# Patient Record
Sex: MALE | Race: WHITE | Employment: OTHER | URBAN - METROPOLITAN AREA
[De-identification: names, ages, dates, MRNs, and addresses within clinical notes are randomized per-mention and may not be internally consistent; named-entity substitution may affect disease eponyms.]

---

## 2018-11-29 ENCOUNTER — APPOINTMENT (OUTPATIENT)
Dept: GENERAL RADIOLOGY | Age: 68
DRG: 251 | End: 2018-11-29
Attending: EMERGENCY MEDICINE
Payer: MEDICARE

## 2018-11-29 ENCOUNTER — HOSPITAL ENCOUNTER (INPATIENT)
Age: 68
LOS: 1 days | Discharge: HOME | DRG: 251 | End: 2018-12-01
Attending: EMERGENCY MEDICINE | Admitting: INTERNAL MEDICINE
Payer: MEDICARE

## 2018-11-29 DIAGNOSIS — I21.4 NSTEMI (NON-ST ELEVATED MYOCARDIAL INFARCTION) (HCC): Primary | ICD-10-CM

## 2018-11-29 DIAGNOSIS — I20.0 UNSTABLE ANGINA (HCC): ICD-10-CM

## 2018-11-29 PROBLEM — I24.9 ACS (ACUTE CORONARY SYNDROME) (HCC): Status: ACTIVE | Noted: 2018-11-29

## 2018-11-29 LAB
ALBUMIN SERPL-MCNC: 3.4 G/DL (ref 3.5–5)
ALBUMIN/GLOB SERPL: 1 {RATIO} (ref 1.1–2.2)
ALP SERPL-CCNC: 106 U/L (ref 45–117)
ALT SERPL-CCNC: 27 U/L (ref 12–78)
ANION GAP SERPL CALC-SCNC: 3 MMOL/L (ref 5–15)
AST SERPL-CCNC: 12 U/L (ref 15–37)
ATRIAL RATE: 55 BPM
BASOPHILS # BLD: 0.1 K/UL (ref 0–0.1)
BASOPHILS NFR BLD: 1 % (ref 0–1)
BILIRUB SERPL-MCNC: 0.5 MG/DL (ref 0.2–1)
BNP SERPL-MCNC: 187 PG/ML (ref 0–125)
BUN SERPL-MCNC: 15 MG/DL (ref 6–20)
BUN/CREAT SERPL: 13 (ref 12–20)
CALCIUM SERPL-MCNC: 8.3 MG/DL (ref 8.5–10.1)
CALCULATED P AXIS, ECG09: 90 DEGREES
CALCULATED R AXIS, ECG10: 24 DEGREES
CALCULATED T AXIS, ECG11: 2 DEGREES
CHLORIDE SERPL-SCNC: 109 MMOL/L (ref 97–108)
CK SERPL-CCNC: 113 U/L (ref 39–308)
CO2 SERPL-SCNC: 28 MMOL/L (ref 21–32)
CREAT SERPL-MCNC: 1.16 MG/DL (ref 0.7–1.3)
DIAGNOSIS, 93000: NORMAL
DIFFERENTIAL METHOD BLD: ABNORMAL
EOSINOPHIL # BLD: 0.3 K/UL (ref 0–0.4)
EOSINOPHIL NFR BLD: 3 % (ref 0–7)
ERYTHROCYTE [DISTWIDTH] IN BLOOD BY AUTOMATED COUNT: 13.3 % (ref 11.5–14.5)
GLOBULIN SER CALC-MCNC: 3.5 G/DL (ref 2–4)
GLUCOSE SERPL-MCNC: 95 MG/DL (ref 65–100)
HCT VFR BLD AUTO: 44.5 % (ref 36.6–50.3)
HGB BLD-MCNC: 14.8 G/DL (ref 12.1–17)
IMM GRANULOCYTES # BLD: 0 K/UL (ref 0–0.04)
IMM GRANULOCYTES NFR BLD AUTO: 0 % (ref 0–0.5)
LYMPHOCYTES # BLD: 2.7 K/UL (ref 0.8–3.5)
LYMPHOCYTES NFR BLD: 24 % (ref 12–49)
MCH RBC QN AUTO: 32.2 PG (ref 26–34)
MCHC RBC AUTO-ENTMCNC: 33.3 G/DL (ref 30–36.5)
MCV RBC AUTO: 96.9 FL (ref 80–99)
MONOCYTES # BLD: 1.2 K/UL (ref 0–1)
MONOCYTES NFR BLD: 11 % (ref 5–13)
NEUTS SEG # BLD: 6.7 K/UL (ref 1.8–8)
NEUTS SEG NFR BLD: 61 % (ref 32–75)
NRBC # BLD: 0 K/UL (ref 0–0.01)
NRBC BLD-RTO: 0 PER 100 WBC
P-R INTERVAL, ECG05: 180 MS
PLATELET # BLD AUTO: 237 K/UL (ref 150–400)
PMV BLD AUTO: 11.7 FL (ref 8.9–12.9)
POTASSIUM SERPL-SCNC: 4.2 MMOL/L (ref 3.5–5.1)
PROT SERPL-MCNC: 6.9 G/DL (ref 6.4–8.2)
Q-T INTERVAL, ECG07: 432 MS
QRS DURATION, ECG06: 92 MS
QTC CALCULATION (BEZET), ECG08: 413 MS
RBC # BLD AUTO: 4.59 M/UL (ref 4.1–5.7)
SODIUM SERPL-SCNC: 140 MMOL/L (ref 136–145)
TROPONIN I SERPL-MCNC: 0.13 NG/ML
TROPONIN I SERPL-MCNC: 0.13 NG/ML
VENTRICULAR RATE, ECG03: 55 BPM
WBC # BLD AUTO: 11.1 K/UL (ref 4.1–11.1)

## 2018-11-29 PROCEDURE — 85025 COMPLETE CBC W/AUTO DIFF WBC: CPT

## 2018-11-29 PROCEDURE — 84484 ASSAY OF TROPONIN QUANT: CPT

## 2018-11-29 PROCEDURE — 71045 X-RAY EXAM CHEST 1 VIEW: CPT

## 2018-11-29 PROCEDURE — 83880 ASSAY OF NATRIURETIC PEPTIDE: CPT

## 2018-11-29 PROCEDURE — 99218 HC RM OBSERVATION: CPT

## 2018-11-29 PROCEDURE — 74011250637 HC RX REV CODE- 250/637: Performed by: EMERGENCY MEDICINE

## 2018-11-29 PROCEDURE — 36415 COLL VENOUS BLD VENIPUNCTURE: CPT

## 2018-11-29 PROCEDURE — 80053 COMPREHEN METABOLIC PANEL: CPT

## 2018-11-29 PROCEDURE — 99285 EMERGENCY DEPT VISIT HI MDM: CPT

## 2018-11-29 PROCEDURE — 93005 ELECTROCARDIOGRAM TRACING: CPT

## 2018-11-29 PROCEDURE — 74011250636 HC RX REV CODE- 250/636: Performed by: NURSE PRACTITIONER

## 2018-11-29 PROCEDURE — 82550 ASSAY OF CK (CPK): CPT

## 2018-11-29 PROCEDURE — 74011250637 HC RX REV CODE- 250/637: Performed by: NURSE PRACTITIONER

## 2018-11-29 RX ORDER — NITROGLYCERIN 0.4 MG/1
0.4 TABLET SUBLINGUAL
Status: DISCONTINUED | OUTPATIENT
Start: 2018-11-29 | End: 2018-11-29

## 2018-11-29 RX ORDER — METOPROLOL SUCCINATE 25 MG/1
25 TABLET, EXTENDED RELEASE ORAL EVERY EVENING
Status: DISCONTINUED | OUTPATIENT
Start: 2018-11-29 | End: 2018-12-01 | Stop reason: HOSPADM

## 2018-11-29 RX ORDER — SODIUM CHLORIDE 0.9 % (FLUSH) 0.9 %
5-10 SYRINGE (ML) INJECTION AS NEEDED
Status: DISCONTINUED | OUTPATIENT
Start: 2018-11-29 | End: 2018-12-01 | Stop reason: HOSPADM

## 2018-11-29 RX ORDER — ATORVASTATIN CALCIUM 20 MG/1
20 TABLET, FILM COATED ORAL
COMMUNITY

## 2018-11-29 RX ORDER — SODIUM CHLORIDE 0.9 % (FLUSH) 0.9 %
5-10 SYRINGE (ML) INJECTION EVERY 8 HOURS
Status: DISCONTINUED | OUTPATIENT
Start: 2018-11-29 | End: 2018-12-01 | Stop reason: HOSPADM

## 2018-11-29 RX ORDER — ASPIRIN 325 MG
325 TABLET ORAL ONCE
Status: COMPLETED | OUTPATIENT
Start: 2018-11-29 | End: 2018-11-29

## 2018-11-29 RX ORDER — DIAZEPAM 5 MG/1
5 TABLET ORAL
Status: DISCONTINUED | OUTPATIENT
Start: 2018-11-29 | End: 2018-12-01 | Stop reason: HOSPADM

## 2018-11-29 RX ORDER — DULOXETIN HYDROCHLORIDE 20 MG/1
20 CAPSULE, DELAYED RELEASE ORAL EVERY EVENING
Status: DISCONTINUED | OUTPATIENT
Start: 2018-11-29 | End: 2018-12-01 | Stop reason: HOSPADM

## 2018-11-29 RX ORDER — METOPROLOL TARTRATE 25 MG/1
12.5 TABLET, FILM COATED ORAL 2 TIMES DAILY
Status: DISCONTINUED | OUTPATIENT
Start: 2018-11-29 | End: 2018-11-29

## 2018-11-29 RX ORDER — ASPIRIN 81 MG/1
81 TABLET ORAL DAILY
Status: DISCONTINUED | OUTPATIENT
Start: 2018-11-30 | End: 2018-12-01 | Stop reason: HOSPADM

## 2018-11-29 RX ORDER — ENOXAPARIN SODIUM 150 MG/ML
105 INJECTION SUBCUTANEOUS ONCE
Status: COMPLETED | OUTPATIENT
Start: 2018-11-29 | End: 2018-11-29

## 2018-11-29 RX ORDER — DULOXETIN HYDROCHLORIDE 20 MG/1
20 CAPSULE, DELAYED RELEASE ORAL DAILY
COMMUNITY
End: 2018-12-28

## 2018-11-29 RX ORDER — BUTALBITAL, ACETAMINOPHEN AND CAFFEINE 50; 325; 40 MG/1; MG/1; MG/1
1 TABLET ORAL
Status: COMPLETED | OUTPATIENT
Start: 2018-11-29 | End: 2018-11-29

## 2018-11-29 RX ORDER — IBUPROFEN 200 MG
1 TABLET ORAL DAILY
Status: DISCONTINUED | OUTPATIENT
Start: 2018-11-29 | End: 2018-12-01 | Stop reason: HOSPADM

## 2018-11-29 RX ORDER — ATORVASTATIN CALCIUM 20 MG/1
20 TABLET, FILM COATED ORAL
Status: DISCONTINUED | OUTPATIENT
Start: 2018-11-29 | End: 2018-12-01 | Stop reason: HOSPADM

## 2018-11-29 RX ORDER — PANTOPRAZOLE SODIUM 40 MG/1
40 TABLET, DELAYED RELEASE ORAL
Status: DISCONTINUED | OUTPATIENT
Start: 2018-11-29 | End: 2018-12-01 | Stop reason: HOSPADM

## 2018-11-29 RX ADMIN — ENOXAPARIN SODIUM 105 MG: 120 INJECTION SUBCUTANEOUS at 18:52

## 2018-11-29 RX ADMIN — DULOXETINE HYDROCHLORIDE 20 MG: 20 CAPSULE, DELAYED RELEASE ORAL at 18:41

## 2018-11-29 RX ADMIN — PANTOPRAZOLE SODIUM 40 MG: 40 TABLET, DELAYED RELEASE ORAL at 23:05

## 2018-11-29 RX ADMIN — BUTALBITAL, ACETAMINOPHEN AND CAFFEINE 1 TABLET: 50; 325; 40 TABLET ORAL at 15:10

## 2018-11-29 RX ADMIN — ASPIRIN 325 MG: 325 TABLET ORAL at 12:55

## 2018-11-29 RX ADMIN — METOPROLOL SUCCINATE 25 MG: 50 TABLET, EXTENDED RELEASE ORAL at 18:45

## 2018-11-29 RX ADMIN — NITROGLYCERIN 1 INCH: 20 OINTMENT TOPICAL at 18:46

## 2018-11-29 RX ADMIN — Medication 10 ML: at 23:06

## 2018-11-29 RX ADMIN — NITROGLYCERIN 0.4 MG: 0.4 TABLET, ORALLY DISINTEGRATING SUBLINGUAL at 14:38

## 2018-11-29 RX ADMIN — ATORVASTATIN CALCIUM 20 MG: 20 TABLET, FILM COATED ORAL at 23:05

## 2018-11-29 NOTE — H&P
[address] [place] [address]  [phone] CARDIOLOGY HISTORY AND PHYSICAL Date of  Admission: 11/29/2018 12:36 PM  
 
Admission type:Emergency Subjective:  
  
Gracia Irby is a 77 y.o. male with PMH CAD s/p stent (~ 7  years ago), HTN, CVA, GERD, bladder CA who was admitted for ACS (acute coronary syndrome) ([facility] [I24.9]. Mr. Micheal Degroot presented to the ED with c/o left sided chest pain for the past ~6 weeks. The pain is mostly constant, but occasionally is absent. The pain intermittently radiates down bilateral arms to his elbows. He describes the pain as \"like a sprain\". He does not think that there are any specific aggravating or alleviating factors. Nontender to palpation. He endorses KIM with moderate/heavy activity. No n/v, numbness/tingling, diaphoresis, leg swelling. Mr. Micheal Degroot does not follow with a cardiologist.  He received his prior cath and stent at [facility] in [place] before he had moved here. He states that he had a 95% blockage that required stenting. There are no records in our system. Cardiac risk factors: smoking/ tobacco exposure, obesity, male gender, hypertension. Patient Active Problem List  
 Diagnosis Date Noted  ACS (acute coronary syndrome) ([facility] Past Medical History:  
Diagnosis Date  CAD (coronary artery disease)  Cancer ([facility] bladder caner  Gastrointestinal disorder GERD  Hypertension  Stroke [facility])   
 x's 2 Social History Socioeconomic History  Marital status:  Spouse name: Not on file  Number of children: Not on file  Years of education: Not on file  Highest education level: Not on file Tobacco Use  Smoking status: Current Every Day Smoker Packs/day: 1.00 Years: 57.00 Pack years: 57.00 Types: Cigarettes Substance and Sexual Activity  Alcohol use: No  
 Drug use: No  
 
No Known Allergies History reviewed. No pertinent family history. Current Facility-Administered Medications Medication Dose Route Frequency  nitroglycerin (NITROSTAT) tablet 0.4 mg  0.4 mg SubLINGual Q5MIN PRN  
 [START ON 11/30/2018] aspirin delayed-release tablet 81 mg  81 mg Oral DAILY  atorvastatin (LIPITOR) tablet 20 mg  20 mg Oral QHS  DULoxetine (CYMBALTA) capsule 20 mg  20 mg Oral QPM  
 metoprolol tartrate (LOPRESSOR) tablet 12.5 mg  12.5 mg Oral BID  
 . PHARMACY TO SUBSTITUTE PER PROTOCOL (Reordered from: omeprazole (PRILOSEC) 10 mg capsule)    Per Protocol  nicotine (NICODERM CQ) 14 mg/24 hr patch 1 Patch  1 Patch TransDERmal DAILY  sodium chloride (NS) flush 5-10 mL  5-10 mL IntraVENous Q8H  
 sodium chloride (NS) flush 5-10 mL  5-10 mL IntraVENous PRN  
 nitroglycerin (NITROBID) 2 % ointment 1 Inch  1 Inch Topical Q6H Current Outpatient Medications Medication Sig  
 atorvastatin (LIPITOR) 20 mg tablet Take 20 mg by mouth nightly.  DULoxetine (CYMBALTA) 20 mg capsule Take 20 mg by mouth daily.  metoprolol tartrate (LOPRESSOR) 25 mg tablet Take 25 mg by mouth two (2) times a day.  aspirin 81 mg chewable tablet Take 162 mg by mouth daily.  multivitamin (ONE A DAY) tablet Take 1 Tab by mouth daily.  omeprazole (PRILOSEC) 10 mg capsule Take 20 mg by mouth daily.  gabapentin (NEURONTIN) 300 mg capsule Take 300 mg by mouth three (3) times daily. Review of Symptoms: 
Constitutional: negative Eyes: negative Ears, nose, mouth, throat, and face: negative Respiratory: KIM Cardiovascular: chest pain Gastrointestinal: negative Genitourinary:negative Musculoskeletal:negative Neurological: negative Behvioral/Psych: negative Endocrine: negative Objective:  
Physical Exam: 
General Appearance:   male resting on stretcher in NAD Chest:   Clear Cardiovascular:  Regular rate and rhythm, no murmur.  
Abdomen:   Soft, non-tender, bowel sounds are active.  
 Extremities: palpable distal pulses; no edema Skin:  Warm and dry.  
 
Visit Vitals /89 Pulse (!) 55 Resp 12 SpO2 95% Cardiographics Telemetry: SB 
ECG: SB 
CXR:  No acute process Labs: 
Recent Labs  
  11/29/18 
1249 WBC 11.1 HGB 14.8 HCT 44.5  Recent Labs  
  11/29/18 
1249   
K 4.2 * CO2 28  
GLU 95 BUN 15  
CREA 1.16  
CA 8.3* ALB 3.4* TBILI 0.5 SGOT 12* ALT 27 Recent Labs  
  11/29/18 
1249 TROIQ 0.13* Assessment:  
  
 Active Problems: 
  ACS (acute coronary syndrome) ([facility] (11/29/2018) Plan:  
 
Edgardo Garcia is a 77 y.o. male who presented to the hospital with an ~6 week history of left sided chest pain with intermittent radiation down bilateral arms. History of CAD with stent placement and has a 57 pack year history of smoking. Initial trop 0.13. Patient received ASA and 1 SL nitro in ED. Relatively pain free at time of assessment. · Admit patient for observation for ACS. [place] vs NSTEMI. Awaiting 2nd troponin level. Plan for cardiac cath tomorrow for further evaluation. I discussed the risks/benefits/alternatives of cardiac catheterization +/- PCI with the patient. Risks include (but are not limited to) bleeding, infection, cva/mi/tamponade/death. The patient understands and wishes to proceed. Dose of lovenox now · ASA, statin, BB, nitro paste · Nicotine patch for patient's heavy smoking use. Counseled on cessation benefits.    
 
 
 
Nayeli Marcano NP 
DNP, RN, AGACNP-BC

## 2018-11-29 NOTE — ED PROVIDER NOTES
EMERGENCY DEPARTMENT HISTORY AND PHYSICAL EXAM 
 
 
Date: 11/29/2018 Patient Name: Leonidas Nayak History of Presenting Illness Chief Complaint Patient presents with  Chest Pain Patient reports pain directly over his heart x 6 weeks. Pt denies any radiation of pain or change in symptoms over past 6 weeks. History Provided By: Patient and Patient's Wife HPI: Leonidas Nayak, 77 y.o. male with PMHx significant for CAD s/p coronary stent placement, HTN, PSHx splenectomy, presents ambulatory to the ED with c/o constant L sided CP ongoing for the past 1.5 months. Pt reports intermittent radiating pain into bilateral arms up to the elbow. He currently notes mild L sided CP, but denies any arm pain. Pt describes pain as \"somewhere between\" sharp and dull. Pt denies any alleviating or exacerbating factors. He denies a hx of similar symptoms stating he experienced substernal pain prior to stent placement years ago. Pt denies taking any anticoagulants. He denies any home treatment or OTC medications. Pt states he does not have a cardiologist in this area. He specifically denies any recent fevers, chills, N/V, or any other complaints. Social Hx: (+, 1 ppd) Tobacco, (-) EtOH, (-) illicit drugs There are no other complaints, changes, or physical findings at this time. PCP: Unknown, Provider Current Outpatient Medications Medication Sig Dispense Refill  atorvastatin (LIPITOR) 20 mg tablet Take 20 mg by mouth nightly.  DULoxetine (CYMBALTA) 20 mg capsule Take 20 mg by mouth daily.  metoprolol tartrate (LOPRESSOR) 25 mg tablet Take 25 mg by mouth two (2) times a day.  aspirin 81 mg chewable tablet Take 162 mg by mouth daily.  multivitamin (ONE A DAY) tablet Take 1 Tab by mouth daily.  omeprazole (PRILOSEC) 10 mg capsule Take 20 mg by mouth daily.  gabapentin (NEURONTIN) 300 mg capsule Take 300 mg by mouth three (3) times daily. Past History Past Medical History: 
Past Medical History:  
Diagnosis Date  CAD (coronary artery disease)  Cancer ([address] bladder caner  Gastrointestinal disorder GERD  Hypertension  Stroke [facility])   
 x's 2 Past Surgical History: 
Past Surgical History:  
Procedure Laterality Date [address] UNLISTED Spleenectomy  CARDIAC SURG PROCEDURE UNLIST Cardiac stent  HX ORTHOPAEDIC Left toe surgery great toe  HX ORTHOPAEDIC Left knee replacement  HX ORTHOPAEDIC    
 carpal tunnel left hand  HX ORTHOPAEDIC    
 back surgery  HX OTHER SURGICAL    
 bladder surgery for bladder cancer. Family History: 
History reviewed. No pertinent family history. Social History: 
Social History Tobacco Use  Smoking status: Current Every Day Smoker Packs/day: 1.00 Years: 57.00 Pack years: 57.00 Types: Cigarettes Substance Use Topics  Alcohol use: No  
 Drug use: No  
 
 
Allergies: 
No Known Allergies Review of Systems Review of Systems Constitutional: Negative. Negative for activity change, appetite change, chills, fatigue, fever and unexpected weight change. HENT: Negative. Negative for congestion, hearing loss, rhinorrhea, sneezing and voice change. Eyes: Negative. Negative for pain and visual disturbance. Respiratory: Negative. Negative for apnea, cough, choking, chest tightness and shortness of breath. Cardiovascular: Positive for chest pain. Negative for palpitations. Gastrointestinal: Negative. Negative for abdominal distention, abdominal pain, blood in stool, diarrhea, nausea and vomiting. Genitourinary: Negative. Negative for difficulty urinating, flank pain, frequency and urgency. No discharge Musculoskeletal: Positive for myalgias (CP radiating into bilateral arms). Negative for arthralgias, back pain and neck stiffness. Skin: Negative. Negative for color change and rash. Neurological: Negative. Negative for dizziness, seizures, syncope, speech difficulty, weakness, numbness and headaches. Hematological: Negative for adenopathy. Psychiatric/Behavioral: Negative. Negative for agitation, behavioral problems, dysphoric mood and suicidal ideas. The patient is not nervous/anxious. Physical Exam  
Physical Exam  
Constitutional: He is oriented to person, place, and time. He appears well-developed. No distress. HENT:  
Head: Normocephalic and atraumatic. Mouth/Throat: No oropharyngeal exudate. Dry mucous membranes Eyes: Conjunctivae and EOM are normal. Pupils are equal, round, and reactive to light. Right eye exhibits no discharge. Left eye exhibits no discharge. Neck: Normal range of motion. Neck supple. Cardiovascular: Normal rate, regular rhythm and intact distal pulses. Exam reveals no gallop and no friction rub. No murmur heard. Pulmonary/Chest: Effort normal and breath sounds normal. No respiratory distress. He has no wheezes. He has no rales. He exhibits no tenderness. Abdominal: Soft. Bowel sounds are normal. He exhibits no distension and no mass. There is no tenderness. There is no rebound and no guarding. Well healed abdominal scar from prior spleenectomy Musculoskeletal: Normal range of motion. He exhibits no edema. Lymphadenopathy:  
  He has no cervical adenopathy. Neurological: He is alert and oriented to person, place, and time. No cranial nerve deficit. Coordination normal.  
Skin: Skin is warm and dry. No rash noted. No erythema. Psychiatric: He has a normal mood and affect. Nursing note and vitals reviewed. Diagnostic Study Results Labs - No results found for this or any previous visit (from the past 12 hour(s)). Radiologic Studies - CXR Results  (Last 48 hours) None Medical Decision Making I am the first provider for this patient. I reviewed the vital signs, available nursing notes, past medical history, past surgical history, family history and social history. Vital Signs-Reviewed the patient's vital signs. No data found. Pulse Oximetry Analysis - 91% on RA 
 
EKG interpretation: (Preliminary) 1351 Rhythm: sinus bradycardia; and regular . Rate (approx.): 55; Axis: normal; SC interval: normal; QRS interval: normal ; ST/T wave: normal; Other findings: borderline ekg. Written by Higinio Chicas ED Scribe, as dictated by Patricia Lee. Prachi Orr MD. Records Reviewed: Nursing Notes and Old Medical Records Provider Notes (Medical Decision Making): DDx: ACS, arrhythmia, musculoskeletal, chronic angina ED Course:  
Initial assessment performed. The patients presenting problems have been discussed, and they are in agreement with the care plan formulated and outlined with them. I have encouraged them to ask questions as they arise throughout their visit. 1:53 PM 
I have just reevaluated the patient. I have reviewed His vital signs and determined there is currently no worsening in their condition or physical exam.  Results have been reviewed with them and their questions have been answered. We will continue to review further results as they come available. Will give the pt Nitro SL for continued CP and place the pt on oxygen. Will re-assess the pt post nitro administration. 2:13 PM 
Re-assess pt. He still has CP. Waiting for Nitro administration. HEART SCORE:  
 
History: Highly Suspicious ECG: Normal 
Age: Greater than or equal to 65 years Risk Factors: Greater than or equal to 3 risk factors, or history of atherosclerotic disease Troponin: Greater than or equal to 3x normal limit HEART Score Total : 8 Management Scores 0-3: 0.9-1.7% risk of adverse cardiac event. In the HEART Score study, these patients were discharged (0.99% in the retrospective study, 1.7% in the prospective study) Scores 4-6: 12-16.6% risk of adverse cardiac event. In the HEART Score study, these patients were admitted to the hospital. (11.6% retrospective, 16.6% prospective) Scores ? 7: 50-65% risk of adverse cardiac event. In the HEART Score study, these patients were candidates for early invasive measures. (65.2% retrospective, 50.1% prospective) A MACE (Major Adverse Cardiac Event) was defined as all-cause mortality, myocardial infarction, or coronary revascularization. Original/Primary Reference · Nicci Mejia, [employer]. Chest pain in the emergency room: value of the HEART score. Neth Heart J. 2008;16(6):191-6. Validation · Fran Tinoco, Caridad UP, et al. A prospective validation of the HEART score for chest pain patients at the emergency department. Int J Cardiol. 2013;168(3):2153-8. · Fran Tinoco, Colletta Clos, et al. Chest pain in the emergency room: a multicenter validation of the HEART Score. Crit Pathw Cardiol. 2010;9(3):164-9. · Courtney EFRAIN, Roc RF, Katlyn GRANDE, et al. The HEART Pathway Randomized Controlled Trial One Year Outcomes. [facility] 2018; Consult Note: 
3:16 PM 
Osmin Calixto MD spoke with Lucille Smith MD, Specialty: Cardiology Discussed pt's hx, disposition, and available diagnostic and imaging results. Reviewed care plans. Consultant agrees with plans as outlined. Dr. Halle Eisenberg will come to the ED to evaluate the pt. 
 
3:40 PM 
Rick Mills PA-C is present in the ED to evaluate pt. Critical Care Time: CRITICAL CARE NOTE : 
3:42 PM 
IMPENDING DETERIORATION -Cardiovascular ASSOCIATED RISK FACTORS - Dysrhythmia MANAGEMENT- Bedside Assessment and Supervision of Care INTERPRETATION -  ECG, Blood Pressure and Cardiac Output Measures INTERVENTIONS - Metobolic interventions CASE REVIEW - Medical Sub-Specialist 
TREATMENT RESPONSE -Improved and Stable PERFORMED BY - Self NOTES   : 
 I have spent 50 minutes of critical care time involved in lab review, consultations with specialist, family decision- making, bedside attention and documentation. During this entire length of time I was immediately available to the patient . Osmin Perez MD 
 
Disposition: 
Admit Note: 
4:13 PM 
Pt is being admitted by Dr. Gallito Pierce. The results of their tests and reason(s) for their admission have been discussed with pt and/or available family. They convey agreement and understanding for the need to be admitted and for admission diagnosis. PLAN: 
1. Admit to Cardiology Diagnosis Clinical Impression: 1. NSTEMI (non-ST elevated myocardial infarction) ([facility] 2. Unstable angina (HCC) Attestations: 
 
Attestation: This note is prepared by Aureliano Wood. Pamela Reeves, acting as Scribe for Malik Perez, [address] [place] [address] Hector Perez MD: The scribe's documentation has been prepared under my direction and personally reviewed by me in its entirety. I confirm that the note above accurately reflects all work, treatment, procedures, and medical decision making performed by me.

## 2018-11-30 LAB
CHOLEST SERPL-MCNC: 88 MG/DL
HDLC SERPL-MCNC: 28 MG/DL
HDLC SERPL: 3.1 {RATIO} (ref 0–5)
LDLC SERPL CALC-MCNC: 35.4 MG/DL (ref 0–100)
LIPID PROFILE,FLP: NORMAL
TRIGL SERPL-MCNC: 123 MG/DL (ref ?–150)
TROPONIN I SERPL-MCNC: 0.11 NG/ML
VLDLC SERPL CALC-MCNC: 24.6 MG/DL

## 2018-11-30 PROCEDURE — 93005 ELECTROCARDIOGRAM TRACING: CPT

## 2018-11-30 PROCEDURE — 4A023N7 MEASUREMENT OF CARDIAC SAMPLING AND PRESSURE, LEFT HEART, PERCUTANEOUS APPROACH: ICD-10-PCS | Performed by: INTERNAL MEDICINE

## 2018-11-30 PROCEDURE — 36415 COLL VENOUS BLD VENIPUNCTURE: CPT

## 2018-11-30 PROCEDURE — 74011250636 HC RX REV CODE- 250/636

## 2018-11-30 PROCEDURE — 77030007163 HC COIL EMB AZUR TERU -G

## 2018-11-30 PROCEDURE — 93308 TTE F-UP OR LMTD: CPT

## 2018-11-30 PROCEDURE — 85347 COAGULATION TIME ACTIVATED: CPT

## 2018-11-30 PROCEDURE — 77030015766

## 2018-11-30 PROCEDURE — C1725 CATH, TRANSLUMIN NON-LASER: HCPCS

## 2018-11-30 PROCEDURE — 65660000000 HC RM CCU STEPDOWN

## 2018-11-30 PROCEDURE — C1769 GUIDE WIRE: HCPCS

## 2018-11-30 PROCEDURE — 74011250637 HC RX REV CODE- 250/637: Performed by: INTERNAL MEDICINE

## 2018-11-30 PROCEDURE — C1887 CATHETER, GUIDING: HCPCS

## 2018-11-30 PROCEDURE — 74011250636 HC RX REV CODE- 250/636: Performed by: INTERNAL MEDICINE

## 2018-11-30 PROCEDURE — 77030012468 HC VLV BLEEDBK CNTRL ABBT -B

## 2018-11-30 PROCEDURE — 77030004549 HC CATH ANGI DX PRF MRTM -A

## 2018-11-30 PROCEDURE — 80061 LIPID PANEL: CPT

## 2018-11-30 PROCEDURE — B2151ZZ FLUOROSCOPY OF LEFT HEART USING LOW OSMOLAR CONTRAST: ICD-10-PCS | Performed by: INTERNAL MEDICINE

## 2018-11-30 PROCEDURE — 74011636320 HC RX REV CODE- 636/320

## 2018-11-30 PROCEDURE — 77030019697 HC SYR ANGI INFL MRTM -B

## 2018-11-30 PROCEDURE — 93458 L HRT ARTERY/VENTRICLE ANGIO: CPT

## 2018-11-30 PROCEDURE — 77030028837 HC SYR ANGI PWR INJ COEU -A

## 2018-11-30 PROCEDURE — 84484 ASSAY OF TROPONIN QUANT: CPT

## 2018-11-30 PROCEDURE — 99218 HC RM OBSERVATION: CPT

## 2018-11-30 PROCEDURE — 74011000250 HC RX REV CODE- 250

## 2018-11-30 PROCEDURE — 77030010221 HC SPLNT WR POS TELE -B

## 2018-11-30 PROCEDURE — 77030019569 HC BND COMPR RAD TERU -B

## 2018-11-30 PROCEDURE — 74011250637 HC RX REV CODE- 250/637: Performed by: NURSE PRACTITIONER

## 2018-11-30 PROCEDURE — 02703ZZ DILATION OF CORONARY ARTERY, ONE ARTERY, PERCUTANEOUS APPROACH: ICD-10-PCS | Performed by: INTERNAL MEDICINE

## 2018-11-30 PROCEDURE — B2111ZZ FLUOROSCOPY OF MULTIPLE CORONARY ARTERIES USING LOW OSMOLAR CONTRAST: ICD-10-PCS | Performed by: INTERNAL MEDICINE

## 2018-11-30 PROCEDURE — 77030019698 HC SYR ANGI MDLON MRTM -A

## 2018-11-30 PROCEDURE — C1894 INTRO/SHEATH, NON-LASER: HCPCS

## 2018-11-30 PROCEDURE — 77030008543 HC TBNG MON PRSS MRTM -A

## 2018-11-30 RX ORDER — HEPARIN SODIUM 200 [USP'U]/100ML
INJECTION, SOLUTION INTRAVENOUS
Status: COMPLETED
Start: 2018-11-30 | End: 2018-11-30

## 2018-11-30 RX ORDER — HEPARIN SODIUM 1000 [USP'U]/ML
INJECTION, SOLUTION INTRAVENOUS; SUBCUTANEOUS
Status: COMPLETED
Start: 2018-11-30 | End: 2018-11-30

## 2018-11-30 RX ORDER — PROTAMINE SULFATE 10 MG/ML
INJECTION, SOLUTION INTRAVENOUS
Status: COMPLETED
Start: 2018-11-30 | End: 2018-11-30

## 2018-11-30 RX ORDER — MIDAZOLAM HYDROCHLORIDE 1 MG/ML
.5-2 INJECTION, SOLUTION INTRAMUSCULAR; INTRAVENOUS
Status: DISCONTINUED | OUTPATIENT
Start: 2018-11-30 | End: 2018-11-30 | Stop reason: HOSPADM

## 2018-11-30 RX ORDER — FENTANYL CITRATE 50 UG/ML
INJECTION, SOLUTION INTRAMUSCULAR; INTRAVENOUS
Status: DISCONTINUED
Start: 2018-11-30 | End: 2018-12-01 | Stop reason: HOSPADM

## 2018-11-30 RX ORDER — NITROGLYCERIN 400 UG/1
1 SPRAY ORAL
Status: COMPLETED | OUTPATIENT
Start: 2018-11-30 | End: 2018-11-30

## 2018-11-30 RX ORDER — MIDAZOLAM HYDROCHLORIDE 1 MG/ML
INJECTION, SOLUTION INTRAMUSCULAR; INTRAVENOUS
Status: COMPLETED
Start: 2018-11-30 | End: 2018-11-30

## 2018-11-30 RX ORDER — ACETAMINOPHEN 325 MG/1
650 TABLET ORAL
Status: DISCONTINUED | OUTPATIENT
Start: 2018-11-30 | End: 2018-12-01 | Stop reason: HOSPADM

## 2018-11-30 RX ORDER — HEPARIN SODIUM 1000 [USP'U]/ML
7500 INJECTION, SOLUTION INTRAVENOUS; SUBCUTANEOUS ONCE
Status: COMPLETED | OUTPATIENT
Start: 2018-11-30 | End: 2018-11-30

## 2018-11-30 RX ORDER — HEPARIN SODIUM 200 [USP'U]/100ML
500 INJECTION, SOLUTION INTRAVENOUS ONCE
Status: COMPLETED | OUTPATIENT
Start: 2018-11-30 | End: 2018-11-30

## 2018-11-30 RX ORDER — LIDOCAINE HYDROCHLORIDE 10 MG/ML
1-30 INJECTION, SOLUTION EPIDURAL; INFILTRATION; INTRACAUDAL; PERINEURAL
Status: DISCONTINUED | OUTPATIENT
Start: 2018-11-30 | End: 2018-11-30 | Stop reason: HOSPADM

## 2018-11-30 RX ORDER — HEPARIN SODIUM 1000 [USP'U]/ML
2500 INJECTION, SOLUTION INTRAVENOUS; SUBCUTANEOUS ONCE
Status: COMPLETED | OUTPATIENT
Start: 2018-11-30 | End: 2018-11-30

## 2018-11-30 RX ORDER — FENTANYL CITRATE 50 UG/ML
25-50 INJECTION, SOLUTION INTRAMUSCULAR; INTRAVENOUS
Status: DISCONTINUED | OUTPATIENT
Start: 2018-11-30 | End: 2018-11-30 | Stop reason: HOSPADM

## 2018-11-30 RX ORDER — FENTANYL CITRATE 50 UG/ML
INJECTION, SOLUTION INTRAMUSCULAR; INTRAVENOUS
Status: COMPLETED
Start: 2018-11-30 | End: 2018-11-30

## 2018-11-30 RX ORDER — PROTAMINE SULFATE 10 MG/ML
INJECTION, SOLUTION INTRAVENOUS
Status: DISCONTINUED
Start: 2018-11-30 | End: 2018-12-01 | Stop reason: HOSPADM

## 2018-11-30 RX ORDER — NITROGLYCERIN 400 UG/1
1 SPRAY ORAL AS NEEDED
Status: DISCONTINUED | OUTPATIENT
Start: 2018-11-30 | End: 2018-11-30 | Stop reason: HOSPADM

## 2018-11-30 RX ORDER — PROTAMINE SULFATE 10 MG/ML
20 INJECTION, SOLUTION INTRAVENOUS
Status: COMPLETED | OUTPATIENT
Start: 2018-11-30 | End: 2018-11-30

## 2018-11-30 RX ORDER — LIDOCAINE HYDROCHLORIDE 10 MG/ML
INJECTION, SOLUTION EPIDURAL; INFILTRATION; INTRACAUDAL; PERINEURAL
Status: COMPLETED
Start: 2018-11-30 | End: 2018-11-30

## 2018-11-30 RX ORDER — VERAPAMIL HYDROCHLORIDE 2.5 MG/ML
INJECTION, SOLUTION INTRAVENOUS
Status: COMPLETED
Start: 2018-11-30 | End: 2018-11-30

## 2018-11-30 RX ORDER — VERAPAMIL HYDROCHLORIDE 2.5 MG/ML
2.5 INJECTION, SOLUTION INTRAVENOUS ONCE
Status: COMPLETED | OUTPATIENT
Start: 2018-11-30 | End: 2018-11-30

## 2018-11-30 RX ADMIN — PROTAMINE SULFATE 20 MG: 10 INJECTION, SOLUTION INTRAVENOUS at 17:53

## 2018-11-30 RX ADMIN — LIDOCAINE HYDROCHLORIDE 1 ML: 10 INJECTION, SOLUTION EPIDURAL; INFILTRATION; INTRACAUDAL; PERINEURAL at 15:59

## 2018-11-30 RX ADMIN — NITROGLYCERIN 1 SPRAY: 400 SPRAY ORAL at 17:08

## 2018-11-30 RX ADMIN — MIDAZOLAM HYDROCHLORIDE 1 MG: 1 INJECTION, SOLUTION INTRAMUSCULAR; INTRAVENOUS at 17:53

## 2018-11-30 RX ADMIN — FENTANYL CITRATE 25 MCG: 50 INJECTION, SOLUTION INTRAMUSCULAR; INTRAVENOUS at 16:34

## 2018-11-30 RX ADMIN — VERAPAMIL HYDROCHLORIDE 2.5 MG: 2.5 INJECTION, SOLUTION INTRAVENOUS at 15:59

## 2018-11-30 RX ADMIN — HEPARIN SODIUM IN SODIUM CHLORIDE 1000 UNITS: 200 INJECTION INTRAVENOUS at 15:54

## 2018-11-30 RX ADMIN — MIDAZOLAM HYDROCHLORIDE 2 MG: 1 INJECTION, SOLUTION INTRAMUSCULAR; INTRAVENOUS at 15:38

## 2018-11-30 RX ADMIN — ASPIRIN 81 MG: 81 TABLET, COATED ORAL at 08:24

## 2018-11-30 RX ADMIN — FENTANYL CITRATE 25 MCG: 50 INJECTION, SOLUTION INTRAMUSCULAR; INTRAVENOUS at 16:52

## 2018-11-30 RX ADMIN — NITROGLYCERIN 1 INCH: 20 OINTMENT TOPICAL at 04:17

## 2018-11-30 RX ADMIN — MIDAZOLAM HYDROCHLORIDE 1 MG: 1 INJECTION, SOLUTION INTRAMUSCULAR; INTRAVENOUS at 17:03

## 2018-11-30 RX ADMIN — MIDAZOLAM HYDROCHLORIDE 1 MG: 1 INJECTION, SOLUTION INTRAMUSCULAR; INTRAVENOUS at 16:52

## 2018-11-30 RX ADMIN — NITROGLYCERIN 200 MCG: 5 INJECTION, SOLUTION INTRAVENOUS at 15:59

## 2018-11-30 RX ADMIN — HEPARIN SODIUM 7500 UNITS: 1000 INJECTION INTRAVENOUS; SUBCUTANEOUS at 16:26

## 2018-11-30 RX ADMIN — FENTANYL CITRATE 25 MCG: 50 INJECTION, SOLUTION INTRAMUSCULAR; INTRAVENOUS at 15:38

## 2018-11-30 RX ADMIN — MIDAZOLAM HYDROCHLORIDE 1 MG: 1 INJECTION, SOLUTION INTRAMUSCULAR; INTRAVENOUS at 16:34

## 2018-11-30 RX ADMIN — IOPAMIDOL 90 ML: 755 INJECTION, SOLUTION INTRAVENOUS at 17:54

## 2018-11-30 RX ADMIN — Medication 10 ML: at 04:18

## 2018-11-30 RX ADMIN — NITROGLYCERIN 1 SPRAY: 400 SPRAY ORAL at 17:17

## 2018-11-30 RX ADMIN — VERAPAMIL HYDROCHLORIDE 2.5 MG: 2.5 INJECTION INTRAVENOUS at 15:59

## 2018-11-30 RX ADMIN — ATORVASTATIN CALCIUM 20 MG: 20 TABLET, FILM COATED ORAL at 21:09

## 2018-11-30 RX ADMIN — ACETAMINOPHEN 650 MG: 325 TABLET ORAL at 08:26

## 2018-11-30 RX ADMIN — FENTANYL CITRATE 25 MCG: 50 INJECTION, SOLUTION INTRAMUSCULAR; INTRAVENOUS at 17:02

## 2018-11-30 RX ADMIN — HEPARIN SODIUM 2500 UNITS: 1000 INJECTION INTRAVENOUS; SUBCUTANEOUS at 15:59

## 2018-11-30 RX ADMIN — FENTANYL CITRATE 25 MCG: 50 INJECTION, SOLUTION INTRAMUSCULAR; INTRAVENOUS at 17:21

## 2018-11-30 RX ADMIN — IOPAMIDOL 130 ML: 755 INJECTION, SOLUTION INTRAVENOUS at 16:30

## 2018-11-30 RX ADMIN — NITROGLYCERIN 1 INCH: 20 OINTMENT TOPICAL at 12:24

## 2018-11-30 RX ADMIN — NITROGLYCERIN 1 INCH: 20 OINTMENT TOPICAL at 18:37

## 2018-11-30 RX ADMIN — IOPAMIDOL 30 ML: 755 INJECTION, SOLUTION INTRAVENOUS at 16:21

## 2018-11-30 RX ADMIN — HEPARIN SODIUM 1000 UNITS: 200 INJECTION, SOLUTION INTRAVENOUS at 15:54

## 2018-11-30 RX ADMIN — HEPARIN SODIUM 2500 UNITS: 1000 INJECTION, SOLUTION INTRAVENOUS; SUBCUTANEOUS at 15:59

## 2018-11-30 NOTE — PROGRESS NOTES
Problem: Falls - Risk of 
Goal: *Absence of Falls Document Tejas Kwok Fall Risk and appropriate interventions in the flowsheet. Outcome: Progressing Towards Goal 
Fall Risk Interventions:

## 2018-11-30 NOTE — PROGRESS NOTES
[address] [place], [place] [phone] Cardiology Progress Note 11/30/2018 1245 PM 
 
Admit Date: 11/29/2018 Admit Diagnosis:  
ACS (acute coronary syndrome) ([facility] [I24.9] Subjective:  
 
Jared Meier is a 77 y.o. male with PMH CAD s/p stent (~ 7  years ago), HTN, CVA, GERD, bladder CA who was admitted for ACS (acute coronary syndrome) ([facility] [I24.9]. Overnight events: 
-VSS with bradycardia 
-trop 0.11 
-Mr. Briscoe is feeling better this morning. He is currently pain free. He did have more chest pain and a  Headache over night. No SOB. Visit Vitals BP (!) 123/96 Pulse (!) 53 Temp 97.6 °F (36.4 °C) Resp 16 Ht 6' 2\" (1.88 m) Wt 109.8 kg (242 lb) SpO2 98% BMI 31.07 kg/m² Current Facility-Administered Medications Medication Dose Route Frequency  acetaminophen (TYLENOL) tablet 650 mg  650 mg Oral Q4H PRN  
 aspirin delayed-release tablet 81 mg  81 mg Oral DAILY  atorvastatin (LIPITOR) tablet 20 mg  20 mg Oral QHS  DULoxetine (CYMBALTA) capsule 20 mg  20 mg Oral QPM  
 pantoprazole (PROTONIX) tablet 40 mg  40 mg Oral QHS  nicotine (NICODERM CQ) 14 mg/24 hr patch 1 Patch  1 Patch TransDERmal DAILY  sodium chloride (NS) flush 5-10 mL  5-10 mL IntraVENous Q8H  
 sodium chloride (NS) flush 5-10 mL  5-10 mL IntraVENous PRN  
 nitroglycerin (NITROBID) 2 % ointment 1 Inch  1 Inch Topical Q6H  
 metoprolol succinate (TOPROL-XL) XL tablet 25 mg  25 mg Oral QPM  
 diazePAM (VALIUM) tablet 5 mg  5 mg Oral Q6H PRN Objective:  
  
Physical Exam: 
General Appearance:  pleasant  Adult  male resting in bed in [facility] Chest:   Clear Cardiovascular:  Regular rate and rhythm, no murmur.  
Abdomen:   Soft, non-tender, bowel sounds are active.  
Extremities: palpable distal pulses; no edema Skin:  Warm and dry.  
 
Data Review:  
Recent Labs  
  11/29/18 
1249 WBC 11.1 HGB 14.8 HCT 44.5  Recent Labs  
  11/29/18 1249  
  
K 4.2 * CO2 28  
GLU 95 BUN 15  
CREA 1.16  
CA 8.3* ALB 3.4* TBILI 0.5 SGOT 12* ALT 27 Recent Labs 11/30/18 
0410 11/29/18 
1606 11/29/18 
1249 TROIQ 0.11* 0.13* 0.13* No intake or output data in the 24 hours ending 11/30/18 1318 Telemetry: SB 
EKG: SB 
Cxray: no acute process Assessment:  
 
Active Problems: 
  ACS (acute coronary syndrome) ([facility] (11/29/2018) Plan:  
 
 
 ACS:  Pain for 6 weeks. Relieved with nitro. Troponin flat. · Cardiac cath later today · Continue ASA, statin, BB, nitro paste · Further orders based upon cath results Yary Noriega NP 
DNP, RN, AGACNP-BC

## 2018-12-01 VITALS
HEART RATE: 51 BPM | OXYGEN SATURATION: 97 % | BODY MASS INDEX: 31.06 KG/M2 | DIASTOLIC BLOOD PRESSURE: 77 MMHG | WEIGHT: 242 LBS | RESPIRATION RATE: 16 BRPM | HEIGHT: 74 IN | TEMPERATURE: 98 F | SYSTOLIC BLOOD PRESSURE: 137 MMHG

## 2018-12-01 LAB
ATRIAL RATE: 46 BPM
CALCULATED P AXIS, ECG09: 32 DEGREES
CALCULATED R AXIS, ECG10: 42 DEGREES
CALCULATED T AXIS, ECG11: 21 DEGREES
DIAGNOSIS, 93000: NORMAL
P-R INTERVAL, ECG05: 202 MS
Q-T INTERVAL, ECG07: 470 MS
QRS DURATION, ECG06: 94 MS
QTC CALCULATION (BEZET), ECG08: 411 MS
VENTRICULAR RATE, ECG03: 46 BPM

## 2018-12-01 PROCEDURE — 74011250637 HC RX REV CODE- 250/637: Performed by: NURSE PRACTITIONER

## 2018-12-01 RX ADMIN — ASPIRIN 81 MG: 81 TABLET, COATED ORAL at 08:19

## 2018-12-01 RX ADMIN — Medication 10 ML: at 06:23

## 2018-12-01 RX ADMIN — NITROGLYCERIN 1 INCH: 20 OINTMENT TOPICAL at 06:21

## 2018-12-01 NOTE — PROGRESS NOTES
Problem: Falls - Risk of 
Goal: *Absence of Falls Document Enrike Burt Fall Risk and appropriate interventions in the flowsheet. Outcome: Progressing Towards Goal 
Fall Risk Interventions: 
  
 
  
 
Medication Interventions: Patient to call before getting OOB

## 2018-12-01 NOTE — PROGRESS NOTES
Cardiology Progress Note [address] [facility], [address]  [phone] 
 
12/1/2018 8:20 AM 
 
Admit Date: 11/29/2018 Admit Diagnosis: ACS (acute coronary syndrome) (HCC) [I24.9];ACS (acute coronary syndrome) ([address] [I24.9] Subjective:  
 
Edgardo Garcia  Is sp ptca of prox lad. He is ambulating without issue. Visit Vitals /77 (BP 1 Location: Left arm, BP Patient Position: At rest) Pulse (!) 51 Temp 98 °F (36.7 °C) Resp 16 Ht 6' 2\" (1.88 m) Wt 242 lb (109.8 kg) SpO2 97% BMI 31.07 kg/m² Current Facility-Administered Medications Medication Dose Route Frequency  acetaminophen (TYLENOL) tablet 650 mg  650 mg Oral Q4H PRN  
 nitroglycerin 1 mg/10mL injection  fentaNYL citrate (PF) 50 mcg/mL injection  thrombin (bovine) (THROMBIN-JMI) 5,000 unit topical solution  iopamidol (ISOVUE-370) 76 % injection  protamine 10 mg/mL injection  aspirin delayed-release tablet 81 mg  81 mg Oral DAILY  atorvastatin (LIPITOR) tablet 20 mg  20 mg Oral QHS  DULoxetine (CYMBALTA) capsule 20 mg  20 mg Oral QPM  
 pantoprazole (PROTONIX) tablet 40 mg  40 mg Oral QHS  nicotine (NICODERM CQ) 14 mg/24 hr patch 1 Patch  1 Patch TransDERmal DAILY  sodium chloride (NS) flush 5-10 mL  5-10 mL IntraVENous Q8H  
 sodium chloride (NS) flush 5-10 mL  5-10 mL IntraVENous PRN  
 nitroglycerin (NITROBID) 2 % ointment 1 Inch  1 Inch Topical Q6H  
 metoprolol succinate (TOPROL-XL) XL tablet 25 mg  25 mg Oral QPM  
 diazePAM (VALIUM) tablet 5 mg  5 mg Oral Q6H PRN Objective:  
  
Visit Vitals /77 (BP 1 Location: Left arm, BP Patient Position: At rest) Pulse (!) 51 Temp 98 °F (36.7 °C) Resp 16 Ht 6' 2\" (1.88 m) Wt 242 lb (109.8 kg) SpO2 97% BMI 31.07 kg/m² Physical Exam: Abdomen: soft, non-tender Extremities: extremities normal 
Heart: regular rate and rhythm Lungs: clear to auscultation bilaterally Pulses: 2+ and symmetric Data Review:  
Labs:   
Recent Labs  
  11/29/18 
1249 WBC 11.1 HGB 14.8 HCT 44.5  Recent Labs  
  11/29/18 
1249   
K 4.2 * CO2 28  
GLU 95 BUN 15  
CREA 1.16  
CA 8.3* ALB 3.4* TBILI 0.5 SGOT 12* ALT 27 Recent Labs 11/30/18 
0410 11/29/18 
1606 11/29/18 
1249 TROIQ 0.11* 0.13* 0.13* Intake/Output Summary (Last 24 hours) at 12/1/2018 0820 Last data filed at 12/1/2018 6397 Gross per 24 hour Intake  Output 950 ml Net -950 ml Telemetry: s velvet Assessment:  
 
Active Problems: 
  ACS (acute coronary syndrome) ([facility] (11/29/2018) Plan:  
 
Medina Lucas is sp ptca of lad after some extravasation of contrast noted on cath. Asymptomatic overnight. Cont asa/statin/bb. Olivia Ellison for [employer]. F/u with Dr Bronwyn Duhamel Cheryn Jeans, MD, [facility] 
 
12/1/2018

## 2018-12-01 NOTE — PROGRESS NOTES
Bedside shift change report given to John Kolb RN  (oncoming nurse) by Stewart Tubbs RN  (offgoing nurse). Report included the following information Procedure Summary.

## 2018-12-01 NOTE — PROGRESS NOTES
Problem: Falls - Risk of 
Goal: *Absence of Falls Document Wes Adams Fall Risk and appropriate interventions in the flowsheet. Outcome: Progressing Towards Goal 
Fall Risk Interventions:

## 2018-12-03 ENCOUNTER — HOSPITAL ENCOUNTER (EMERGENCY)
Age: 68
Discharge: HOME | End: 2018-12-03
Attending: EMERGENCY MEDICINE
Payer: MEDICARE

## 2018-12-03 ENCOUNTER — TELEPHONE (OUTPATIENT)
Dept: CARDIOLOGY CLINIC | Age: 68
End: 2018-12-03

## 2018-12-03 ENCOUNTER — APPOINTMENT (OUTPATIENT)
Dept: GENERAL RADIOLOGY | Age: 68
End: 2018-12-03
Attending: EMERGENCY MEDICINE
Payer: MEDICARE

## 2018-12-03 ENCOUNTER — TELEPHONE (OUTPATIENT)
Dept: CARDIAC REHAB | Age: 68
End: 2018-12-03

## 2018-12-03 VITALS
HEIGHT: 75 IN | OXYGEN SATURATION: 96 % | HEART RATE: 68 BPM | BODY MASS INDEX: 33.31 KG/M2 | SYSTOLIC BLOOD PRESSURE: 151 MMHG | RESPIRATION RATE: 16 BRPM | TEMPERATURE: 98.5 F | WEIGHT: 267.86 LBS | DIASTOLIC BLOOD PRESSURE: 83 MMHG

## 2018-12-03 DIAGNOSIS — R07.9 CHEST PAIN, UNSPECIFIED TYPE: Primary | ICD-10-CM

## 2018-12-03 LAB
ACT BLD: 131 SECS (ref 79–138)
ACT BLD: 213 SECS (ref 79–138)
ACT BLD: >1000 SECS (ref 79–138)
ALBUMIN SERPL-MCNC: 3.5 G/DL (ref 3.5–5)
ALBUMIN/GLOB SERPL: 1 {RATIO} (ref 1.1–2.2)
ALP SERPL-CCNC: 103 U/L (ref 45–117)
ALT SERPL-CCNC: 37 U/L (ref 12–78)
ANION GAP SERPL CALC-SCNC: 3 MMOL/L (ref 5–15)
AST SERPL-CCNC: 17 U/L (ref 15–37)
BASOPHILS # BLD: 0.1 K/UL (ref 0–0.1)
BASOPHILS NFR BLD: 1 % (ref 0–1)
BILIRUB SERPL-MCNC: 0.3 MG/DL (ref 0.2–1)
BUN SERPL-MCNC: 19 MG/DL (ref 6–20)
BUN/CREAT SERPL: 15 (ref 12–20)
CALCIUM SERPL-MCNC: 8.7 MG/DL (ref 8.5–10.1)
CHLORIDE SERPL-SCNC: 108 MMOL/L (ref 97–108)
CK SERPL-CCNC: 135 U/L (ref 39–308)
CO2 SERPL-SCNC: 28 MMOL/L (ref 21–32)
CREAT SERPL-MCNC: 1.24 MG/DL (ref 0.7–1.3)
DIFFERENTIAL METHOD BLD: ABNORMAL
EOSINOPHIL # BLD: 0.4 K/UL (ref 0–0.4)
EOSINOPHIL NFR BLD: 4 % (ref 0–7)
ERYTHROCYTE [DISTWIDTH] IN BLOOD BY AUTOMATED COUNT: 13.3 % (ref 11.5–14.5)
GLOBULIN SER CALC-MCNC: 3.5 G/DL (ref 2–4)
GLUCOSE SERPL-MCNC: 85 MG/DL (ref 65–100)
HCT VFR BLD AUTO: 43.7 % (ref 36.6–50.3)
HGB BLD-MCNC: 14.4 G/DL (ref 12.1–17)
IMM GRANULOCYTES # BLD: 0 K/UL (ref 0–0.04)
IMM GRANULOCYTES NFR BLD AUTO: 0 % (ref 0–0.5)
LYMPHOCYTES # BLD: 2.4 K/UL (ref 0.8–3.5)
LYMPHOCYTES NFR BLD: 26 % (ref 12–49)
MCH RBC QN AUTO: 32.1 PG (ref 26–34)
MCHC RBC AUTO-ENTMCNC: 33 G/DL (ref 30–36.5)
MCV RBC AUTO: 97.5 FL (ref 80–99)
MONOCYTES # BLD: 1.1 K/UL (ref 0–1)
MONOCYTES NFR BLD: 12 % (ref 5–13)
NEUTS SEG # BLD: 5.3 K/UL (ref 1.8–8)
NEUTS SEG NFR BLD: 57 % (ref 32–75)
NRBC # BLD: 0 K/UL (ref 0–0.01)
NRBC BLD-RTO: 0 PER 100 WBC
PLATELET # BLD AUTO: 222 K/UL (ref 150–400)
PMV BLD AUTO: 12 FL (ref 8.9–12.9)
POTASSIUM SERPL-SCNC: 4.5 MMOL/L (ref 3.5–5.1)
PROT SERPL-MCNC: 7 G/DL (ref 6.4–8.2)
RBC # BLD AUTO: 4.48 M/UL (ref 4.1–5.7)
SODIUM SERPL-SCNC: 139 MMOL/L (ref 136–145)
TROPONIN I SERPL-MCNC: 0.1 NG/ML
TROPONIN I SERPL-MCNC: 0.11 NG/ML
WBC # BLD AUTO: 9.3 K/UL (ref 4.1–11.1)

## 2018-12-03 PROCEDURE — 85025 COMPLETE CBC W/AUTO DIFF WBC: CPT

## 2018-12-03 PROCEDURE — 71046 X-RAY EXAM CHEST 2 VIEWS: CPT

## 2018-12-03 PROCEDURE — 93005 ELECTROCARDIOGRAM TRACING: CPT

## 2018-12-03 PROCEDURE — 80053 COMPREHEN METABOLIC PANEL: CPT

## 2018-12-03 PROCEDURE — 82550 ASSAY OF CK (CPK): CPT

## 2018-12-03 PROCEDURE — 74011250637 HC RX REV CODE- 250/637: Performed by: EMERGENCY MEDICINE

## 2018-12-03 PROCEDURE — 84484 ASSAY OF TROPONIN QUANT: CPT

## 2018-12-03 PROCEDURE — 36415 COLL VENOUS BLD VENIPUNCTURE: CPT

## 2018-12-03 PROCEDURE — 99283 EMERGENCY DEPT VISIT LOW MDM: CPT

## 2018-12-03 RX ORDER — GUAIFENESIN 100 MG/5ML
243 LIQUID (ML) ORAL
Status: COMPLETED | OUTPATIENT
Start: 2018-12-03 | End: 2018-12-03

## 2018-12-03 RX ADMIN — ASPIRIN 81 MG 243 MG: 81 TABLET ORAL at 18:55

## 2018-12-03 NOTE — TELEPHONE ENCOUNTER
Verified patient with two identifiers. Spoke with pt, has an apt as a new pt on 12/11. Pt call today stating he has tightness in chest, no pain. /83. Advised pt to call PCP to be evaluated or go to ER. Cannot advised pt without being seen first. Pt verbalized understanding.

## 2018-12-04 NOTE — DISCHARGE INSTRUCTIONS

## 2018-12-04 NOTE — ED NOTES
Pt seen and evaluated by MD prior to RN assessment. Pt discharged at this time prior to RN assessment.

## 2018-12-04 NOTE — ED PROVIDER NOTES
EMERGENCY DEPARTMENT HISTORY AND PHYSICAL EXAM 
 
 
Date: 12/3/2018 Patient Name: Brent Brambila History of Presenting Illness Chief Complaint Patient presents with  Chest Pain  
  mid to L sided chest pain; reports that he has been having pain for several months; reports that he was just discharged Saturday after having cardiac cath, but no stents were able to be placed; reports \"2 blocked arteries\"; hx of MI  
 Palpitations History Provided By: Patient HPI: Brent Brambila, 77 y.o. male with PMHx significant for palpitation, MI, CAD, CVA, and HTN, presents himself to the ED with cc of intermittent, aching left sided CP since today (12/3/18). He notes his CP lasted for 15 minutes today when he was resting. He notes improvement of CP with ambulating. Pt reports of recently being discharged on Saturday (12/1/18) after having a cardiac cath. Pt has been pain free during his ED visit. There are no other complaints, changes, or physical findings at this time. PCP: Yaquelin Iglesias MD  
Cardiology: Dr. Zofia Arteaga Current Outpatient Medications Medication Sig Dispense Refill  atorvastatin (LIPITOR) 20 mg tablet Take 20 mg by mouth nightly.  DULoxetine (CYMBALTA) 20 mg capsule Take 20 mg by mouth daily.  metoprolol tartrate (LOPRESSOR) 25 mg tablet Take 25 mg by mouth two (2) times a day.  aspirin 81 mg chewable tablet Take 162 mg by mouth daily.  multivitamin (ONE A DAY) tablet Take 1 Tab by mouth daily.  omeprazole (PRILOSEC) 10 mg capsule Take 20 mg by mouth daily.  gabapentin (NEURONTIN) 300 mg capsule Take 300 mg by mouth three (3) times daily. Past History Past Medical History: 
Past Medical History:  
Diagnosis Date  CAD (coronary artery disease)  Cancer ([facility] [address] bladder caner  Gastrointestinal disorder GERD  Hypertension  Stroke [facility])   
 x's 2 Past Surgical History: 
Past Surgical History: Procedure Laterality Date [address] [place] [address] UNLISTED Spleenectomy  CARDIAC SURG PROCEDURE UNLIST Cardiac stent  HX ORTHOPAEDIC Left toe surgery great toe  HX ORTHOPAEDIC Left knee replacement  HX ORTHOPAEDIC    
 carpal tunnel left hand  HX ORTHOPAEDIC    
 back surgery  HX OTHER SURGICAL    
 bladder surgery for bladder cancer. Family History: No family history on file. Social History: 
Social History Tobacco Use  Smoking status: Current Every Day Smoker Packs/day: 1.00 Years: 57.00 Pack years: 57.00 Types: Cigarettes Substance Use Topics  Alcohol use: No  
 Drug use: No  
 
 
Allergies: 
No Known Allergies Review of Systems Review of Systems Constitutional: Negative for chills, fatigue and fever. HENT: Negative for congestion, ear pain and rhinorrhea. Eyes: Negative for pain and visual disturbance. Respiratory: Negative for cough and shortness of breath. Cardiovascular: Positive for chest pain (left sided). Negative for leg swelling. Gastrointestinal: Negative for abdominal pain, diarrhea, nausea and vomiting. Genitourinary: Negative for dysuria and flank pain. Musculoskeletal: Negative for back pain and neck pain. Skin: Negative for rash and wound. Neurological: Negative for dizziness, syncope and headaches. Psychiatric/Behavioral: Negative for self-injury and suicidal ideas. Physical Exam  
Physical Exam  
 
GENERAL: alert and oriented, no acute distress EYES: PEERL, No injection, discharge or icterus. HENT: Mucous membranes pink and moist. 
NECK: Supple LUNGS: Airway patent. Non-labored respirations. Breath sounds clear with good air entry bilaterally. HEART: bradycardia and rhythm. No peripheral edema ABDOMEN: Non-distended and non-tender, without guarding or rebound. SKIN:  warm, dry MSK/ EXTREMITIES: Without swelling, tenderness or deformity, symmetric with normal ROM NEUROLOGICAL: Alert, oriented Diagnostic Study Results Labs - Recent Results (from the past 12 hour(s)) EKG, 12 LEAD, INITIAL Collection Time: 12/03/18  3:24 PM  
Result Value Ref Range Ventricular Rate 56 BPM  
 Atrial Rate 56 BPM  
 P-R Interval 188 ms QRS Duration 92 ms Q-T Interval 422 ms QTC Calculation (Bezet) 407 ms Calculated P Axis 26 degrees Calculated R Axis 23 degrees Calculated T Axis 16 degrees Diagnosis Sinus bradycardia When compared with ECG of 30-NOV-2018 18:15, No significant change was found CBC WITH AUTOMATED DIFF Collection Time: 12/03/18  3:57 PM  
Result Value Ref Range WBC 9.3 4.1 - 11.1 K/uL  
 RBC 4.48 4.10 - 5.70 M/uL  
 HGB 14.4 12.1 - 17.0 g/dL HCT 43.7 36.6 - 50.3 % MCV 97.5 80.0 - 99.0 FL  
 MCH 32.1 26.0 - 34.0 PG  
 MCHC 33.0 30.0 - 36.5 g/dL  
 RDW 13.3 11.5 - 14.5 % PLATELET 432 584 - 792 K/uL MPV 12.0 8.9 - 12.9 FL  
 NRBC 0.0 0  WBC ABSOLUTE NRBC 0.00 0.00 - 0.01 K/uL NEUTROPHILS 57 32 - 75 % LYMPHOCYTES 26 12 - 49 % MONOCYTES 12 5 - 13 % EOSINOPHILS 4 0 - 7 % BASOPHILS 1 0 - 1 % IMMATURE GRANULOCYTES 0 0.0 - 0.5 % ABS. NEUTROPHILS 5.3 1.8 - 8.0 K/UL  
 ABS. LYMPHOCYTES 2.4 0.8 - 3.5 K/UL  
 ABS. MONOCYTES 1.1 (H) 0.0 - 1.0 K/UL  
 ABS. EOSINOPHILS 0.4 0.0 - 0.4 K/UL  
 ABS. BASOPHILS 0.1 0.0 - 0.1 K/UL  
 ABS. IMM. GRANS. 0.0 0.00 - 0.04 K/UL  
 DF AUTOMATED METABOLIC PANEL, COMPREHENSIVE Collection Time: 12/03/18  3:57 PM  
Result Value Ref Range Sodium 139 136 - 145 mmol/L Potassium 4.5 3.5 - 5.1 mmol/L Chloride 108 97 - 108 mmol/L  
 CO2 28 21 - 32 mmol/L Anion gap 3 (L) 5 - 15 mmol/L Glucose 85 65 - 100 mg/dL BUN 19 6 - 20 MG/DL Creatinine 1.24 0.70 - 1.30 MG/DL  
 BUN/Creatinine ratio 15 12 - 20 GFR est AA >60 >60 ml/min/1.73m2 GFR est non-AA 58 (L) >60 ml/min/1.73m2  Calcium 8.7 8.5 - 10.1 MG/DL  
 Bilirubin, total 0.3 0.2 - 1.0 MG/DL  
 ALT (SGPT) 37 12 - 78 U/L  
 AST (SGOT) 17 15 - 37 U/L Alk. phosphatase 103 45 - 117 U/L Protein, total 7.0 6.4 - 8.2 g/dL Albumin 3.5 3.5 - 5.0 g/dL Globulin 3.5 2.0 - 4.0 g/dL A-G Ratio 1.0 (L) 1.1 - 2.2    
TROPONIN I Collection Time: 12/03/18  3:57 PM  
Result Value Ref Range Troponin-I, Qt. 0.11 (H) <0.05 ng/mL CK W/ REFLX CKMB Collection Time: 12/03/18  3:57 PM  
Result Value Ref Range  39 - 308 U/L  
TROPONIN I Collection Time: 12/03/18  6:57 PM  
Result Value Ref Range Troponin-I, Qt. 0.10 (H) <0.05 ng/mL Radiologic Studies - CXR Results  (Last 48 hours) 12/03/18 1634  XR CHEST PA LAT Final result Impression:  IMPRESSION: No acute cardiopulmonary disease. Narrative: Indication:  chest pain Exam: PA and lateral views of the chest.  
   
Direct comparison is made to prior CXR dated November 29, 2018. Findings: Cardiomediastinal silhouette is within normal limits. Lungs are clear  
bilaterally. Pleural spaces are normal. Osseous structures are intact. Medical Decision Making I am the first provider for this patient. I reviewed the vital signs, available nursing notes, past medical history, past surgical history, family history and social history. Vital Signs-Reviewed the patient's vital signs. Patient Vitals for the past 12 hrs: 
 Temp Pulse Resp BP SpO2  
12/03/18 1859  68 16 151/83 96 % 12/03/18 1520 98.5 °F (36.9 °C) 64 16 (!) 166/96 95 % EKG interpretation: (Preliminary) 15:24 Rhythm: sinus bradycardia; and regular . Rate (approx.): 56; Axis: normal; SD interval: normal; QRS interval: normal ; ST/T wave: normal; Other findings: when compared to ECG from 11/30/18, no significant change was found. Written by COLBY Flores, as dictated by Carmen Lopez MD. Records Reviewed: Nursing Notes and Old Medical Records Provider Notes (Medical Decision Making): On presentation the patient is well appearing, in no acute distress with reassurnig vital signs. Based on the history and exam the differential diagnosis for this patient includes  ACS, MSK chest pain, pleurisy, costochondritis, bronchitis, GERD, esophageal spasm. The pt is unlikely to have PE. There is no pleuritic chest pain, no tachycardia, no hypoxia, , no unilateral leg swelling, no hormone use, , no recent surgery. The pt is unlikely to have aortic dissection. The pulses are equal, there is no sharp tearing chest pain radiating to back, the patient is not extremily hypertensive. Therefore no d-dimer or CTA chest for dissection The pt is unlikley to have esophageal rupture. There is no history of forceful vomiting, patient well appearing, no difficulty swallowing Will pursue cardiac work-up with EKG, troponin, CXR. While the pt is less likely to have pneumonia as there is no cough and no fever, and less likely to have ptx, will order CXR to assess lung fields 8:30 PM 
 
I have re-examined the patient and the patient denies any new or changing symptoms. The patient has had 2 troponins several hours apart at  this time that are at his baseline and an EKG without any signs of acute ischemia. The diagnosis, follow up, return instructions, test esults, x-rays and medications have been discussed and reviewed with the patient. The patient has been given the opportunity to ask questions. The patient expresses understanding of the diagnosis, follow-up and return instructions. The patient agrees to follow up with cardiology as directed and to return immediately if the chest pain worsens.  The patient expresses understanding that although cardiac testing at this time is negative, a cardiac problem could still be present and that a follow-up appointment with a cardiologist for further evaluation and risk factor modification is necessary to complete the evaluation of this complaint. Raliegh Sicard Gerlene Corns, MD  
 
ED Course:  
Initial assessment performed. The patients presenting problems have been discussed, and they are in agreement with the care plan formulated and outlined with them. I have encouraged them to ask questions as they arise throughout their visit. MEDICATIONS GIVEN: 
Medications  
aspirin chewable tablet 243 mg (243 mg Oral Given 12/3/18 4610) Critical Care Time: 0 min Discharge Note: 
8:40 PM 
The pt is ready for discharge. The pt's signs, symptoms, diagnosis, and discharge instructions have been discussed and pt has conveyed their understanding. The pt is to follow up as recommended or return to ER should their symptoms worsen. Plan has been discussed and pt is in agreement. PLAN: 
1. Current Discharge Medication List  
  
 
2. Follow-up Information Follow up With Specialties Details Why Contact Info Christopher Thomason MD Family Practice Schedule an appointment as soon as possible for a visit in 2 days  [place] [address] [address] [place] [id number] [phone] 
  
 follow up with your cardiologist      
  
 
Return to ED if worse Diagnosis Clinical Impression: 1. Chest pain, unspecified type Attestations: This note is prepared by [employer], acting as Scribe for [employer]. Gerlene Corns, MD Raliegh Sicard Gerlene Corns, MD: The scribe's documentation has been prepared under my direction and personally reviewed by me in its entirety. I confirm that the note above accurately reflects all work, treatment, procedures, and medical decision making performed by me.

## 2018-12-05 ENCOUNTER — OFFICE VISIT (OUTPATIENT)
Dept: CARDIOLOGY CLINIC | Age: 68
End: 2018-12-05

## 2018-12-05 VITALS
OXYGEN SATURATION: 97 % | HEIGHT: 75 IN | HEART RATE: 57 BPM | DIASTOLIC BLOOD PRESSURE: 90 MMHG | WEIGHT: 268.9 LBS | RESPIRATION RATE: 18 BRPM | BODY MASS INDEX: 33.43 KG/M2 | SYSTOLIC BLOOD PRESSURE: 126 MMHG

## 2018-12-05 DIAGNOSIS — I25.10 ASHD (ARTERIOSCLEROTIC HEART DISEASE): Primary | ICD-10-CM

## 2018-12-05 DIAGNOSIS — Z98.890 STATUS POST CARDIAC CATHETERIZATION: ICD-10-CM

## 2018-12-05 DIAGNOSIS — R07.9 CHEST PAIN, UNSPECIFIED TYPE: ICD-10-CM

## 2018-12-05 LAB
ATRIAL RATE: 56 BPM
CALCULATED P AXIS, ECG09: 26 DEGREES
CALCULATED R AXIS, ECG10: 23 DEGREES
CALCULATED T AXIS, ECG11: 16 DEGREES
DIAGNOSIS, 93000: NORMAL
P-R INTERVAL, ECG05: 188 MS
Q-T INTERVAL, ECG07: 422 MS
QRS DURATION, ECG06: 92 MS
QTC CALCULATION (BEZET), ECG08: 407 MS
VENTRICULAR RATE, ECG03: 56 BPM

## 2018-12-05 RX ORDER — CELECOXIB 100 MG/1
CAPSULE ORAL
Status: ON HOLD | COMMUNITY
Start: 2018-10-10 | End: 2018-12-20

## 2018-12-05 RX ORDER — IBUPROFEN 200 MG
1 TABLET ORAL EVERY 24 HOURS
Qty: 30 PATCH | Refills: 2 | Status: SHIPPED | OUTPATIENT
Start: 2018-12-05 | End: 2019-01-04

## 2018-12-05 RX ORDER — OXYCODONE AND ACETAMINOPHEN 5; 325 MG/1; MG/1
1 TABLET ORAL
COMMUNITY
Start: 2018-08-09 | End: 2018-12-28

## 2018-12-05 RX ORDER — TESTOSTERONE 20.25 MG/1.25G
GEL TOPICAL
COMMUNITY
Start: 2018-03-09 | End: 2018-12-28

## 2018-12-05 RX ORDER — RANOLAZINE 500 MG/1
500 TABLET, EXTENDED RELEASE ORAL 2 TIMES DAILY
Qty: 60 TAB | Refills: 2 | Status: SHIPPED | OUTPATIENT
Start: 2018-12-05 | End: 2018-12-11

## 2018-12-05 NOTE — PROGRESS NOTES
Jesus Bolanos DNP, ANP-BC  Subjective/HPI:     Hildred Mcburney is a 77 y.o. male is here for hospital follow-up non-STEMI, cardiac catheterization with  of the LAD and PDA. Continues to have intermittent left and middle chest discomfort. SUMMARY:    --  CARDIAC STRUCTURES:  --  Ejection fraction was estimated in the range of 50 % to 55 %, with no  focal wall motion abnormalities    --  CORONARY CIRCULATION:  --  Proximal LAD: There was a 100 % stenosis. There was LANE grade 0 flow  through the vessel (no flow). This lesion is a chronic total occlusion. --   Right PDA: There was a 100 % stenosis. This lesion is a chronic total  occlusion. A very small distal vessel fills by a bridge collateral. It  does not appear suitable for  PCI. --  1ST LESION INTERVENTIONS:  --  A balloon angioplasty was performed on the 100 % lesion in the  proximal LAD. --  Vessel setup was performed. A Fielder XT 190cm wire was  used in an unsuccessful attempt to cross the lesion. The wire passed very  easily into the beginning of the , then met resistance. It was not  advanced with any force. Subsequent pictures showed a small amount of  extravasation that decreased dramatically after > 30 min of balloon  inflations in the LAD proximal to the area of concern. Echo showed no  effusion and heparin was reversed with protamine.     PCP Provider  Iraj Benton MD  Past Medical History:   Diagnosis Date    CAD (coronary artery disease)     Cancer ([facility] [address]     bladder caner    Gastrointestinal disorder     GERD    Hypertension     Stroke ([facility] [address]     x's 2      Past Surgical History:   Procedure Laterality Date    ABDOMEN SURGERY PROC UNLISTED      Spleenectomy    CARDIAC SURG PROCEDURE UNLIST      Cardiac stent     HX ORTHOPAEDIC      Left toe surgery great toe    HX ORTHOPAEDIC      Left knee replacement    HX ORTHOPAEDIC      carpal tunnel left hand    HX ORTHOPAEDIC      back surgery    HX OTHER SURGICAL bladder surgery for bladder cancer. No Known Allergies   History reviewed. No pertinent family history. Current Outpatient Medications   Medication Sig    fish oil-omega-3 fatty acids (FISH OIL) 340-1,000 mg capsule Take 1,000 mg by mouth.  nicotine (NICODERM CQ) 21 mg/24 hr 1 Patch by TransDERmal route every twenty-four (24) hours for 30 days. Disp from same vendor used at [facility]    atorvastatin (LIPITOR) 20 mg tablet Take 20 mg by mouth nightly.  metoprolol tartrate (LOPRESSOR) 25 mg tablet Take 25 mg by mouth daily.  aspirin 81 mg chewable tablet Take 162 mg by mouth daily.  multivitamin (ONE A DAY) tablet Take 1 Tab by mouth daily.  omeprazole (PRILOSEC) 10 mg capsule Take 20 mg by mouth daily.  apixaban (ELIQUIS) 5 mg tablet Take 1 Tab by mouth two (2) times a day.  ranolazine ER (RANEXA) 1,000 mg Take 1 Tab by mouth two (2) times a day for 30 days. No current facility-administered medications for this visit.        Vitals:    12/05/18 1032 12/05/18 1053   BP: 126/86 126/90   Pulse: (!) 57    Resp: 18    SpO2: 97%    Weight: 268 lb 14.4 oz (122 kg)    Height: 6' 3\" (1.905 m)      Social History     Socioeconomic History    Marital status:      Spouse name: Not on file    Number of children: Not on file    Years of education: Not on file    Highest education level: Not on file   Social Needs    Financial resource strain: Not on file    Food insecurity - worry: Not on file    Food insecurity - inability: Not on file   [employer] needs - medical: Not on file   [employer] needs - non-medical: Not on file   Occupational History    Not on file   Tobacco Use    Smoking status: Former Smoker     Packs/day: 1.00     Years: 57.00     Pack years: 57.00     Types: Cigarettes     Start date: 12/17/2018    Smokeless tobacco: Never Used   Substance and Sexual Activity    Alcohol use: No    Drug use: No    Sexual activity: Not on file   Other Topics Concern    Not on file   Social History Narrative    Not on file       I have reviewed the nurses notes, vitals, problem list, allergy list, medical history, family, social history and medications. Review of Symptoms:    General: Pt denies excessive weight gain or loss. Pt is able to conduct ADL's  HEENT: Denies blurred vision, headaches, epistaxis and difficulty swallowing. Respiratory: Denies shortness of breath, + KIM, wheezing or stridor. Cardiovascular: + Chest pain, denies palpitations, edema or PND  Gastrointestinal: Denies poor appetite, indigestion, abdominal pain or blood in stool  Musculoskeletal: Denies pain or swelling from muscles or joints  Neurologic: Denies tremor, paresthesias, or sensory motor disturbance  Skin: Denies rash, itching or texture change. Physical Exam:      General: Well developed, in no acute distress, cooperative and alert  HEENT: No carotid bruits, no JVD, trach is midline. Neck Supple, PEERL, EOM intact. Heart:  Normal S1/S2 negative S3 or S4. Regular, no murmur, gallop or rub.   Respiratory: Clear bilaterally x 4, no wheezing or rales  Abdomen:   Soft, non-tender, no masses, bowel sounds are active.   Extremities:  No edema, normal cap refill, no cyanosis, atraumatic. Neuro: A&Ox3, speech clear, gait stable. Skin: Skin color is normal. No rashes or lesions. Non diaphoretic  Vascular: 2+ pulses symmetric in all extremities.   Right radial access site well-healed    Cardiographics    ECG: Sinus bradycardia  Results for orders placed or performed during the hospital encounter of 12/03/18   EKG, 12 LEAD, INITIAL   Result Value Ref Range    Ventricular Rate 56 BPM    Atrial Rate 56 BPM    P-R Interval 188 ms    QRS Duration 92 ms    Q-T Interval 422 ms    QTC Calculation (Bezet) 407 ms    Calculated P Axis 26 degrees    Calculated R Axis 23 degrees    Calculated T Axis 16 degrees    Diagnosis       Sinus bradycardia  When compared with ECG of 30-NOV-2018 18:15,  No significant change was found  Confirmed by Love Bryant ([id number]) on 12/5/2018 5:20:13 PM           Cardiology Labs:  Lab Results   Component Value Date/Time    Cholesterol, total 88 11/30/2018 04:10 AM    HDL Cholesterol 28 11/30/2018 04:10 AM    LDL, calculated 35.4 11/30/2018 04:10 AM    Triglyceride 123 11/30/2018 04:10 AM    CHOL/HDL Ratio 3.1 11/30/2018 04:10 AM       Lab Results   Component Value Date/Time    Sodium 140 12/20/2018 01:05 PM    Potassium 4.5 12/20/2018 01:05 PM    Chloride 108 12/20/2018 01:05 PM    CO2 29 12/20/2018 01:05 PM    Anion gap 3 (L) 12/20/2018 01:05 PM    Glucose 92 12/20/2018 01:05 PM    BUN 24 (H) 12/20/2018 01:05 PM    Creatinine 1.40 (H) 12/20/2018 01:05 PM    BUN/Creatinine ratio 17 12/20/2018 01:05 PM    GFR est AA >60 12/20/2018 01:05 PM    GFR est non-AA 50 (L) 12/20/2018 01:05 PM    Calcium 8.8 12/20/2018 01:05 PM    Bilirubin, total 0.4 12/10/2018 09:09 PM    AST (SGOT) 13 (L) 12/10/2018 09:09 PM    Alk. phosphatase 102 12/10/2018 09:09 PM    Protein, total 7.0 12/10/2018 09:09 PM    Albumin 3.7 12/10/2018 09:09 PM    Globulin 3.3 12/10/2018 09:09 PM    A-G Ratio 1.1 12/10/2018 09:09 PM    ALT (SGPT) 31 12/10/2018 09:09 PM           Assessment:     Assessment:     Diagnoses and all orders for this visit:    1. ASHD (arteriosclerotic heart disease)  -     CT CORONARY ART W STRUC MORPH FUNC; Future    2. Status post cardiac catheterization  -     AMB POC EKG ROUTINE W/ 12 LEADS, INTER & REP    3. Chest pain, unspecified type  -     AMB POC EKG ROUTINE W/ 12 LEADS, INTER & REP    Other orders  -     nicotine (NICODERM CQ) 21 mg/24 hr; 1 Patch by TransDERmal route every twenty-four (24) hours for 30 days. Disp from same vendor used at [place] [address]    1. ASHD (arteriosclerotic heart disease) I25.10 414.00 CT CORONARY ART W STRUC MORPH FUNC   2. Status post cardiac catheterization Z98.890 V45.89 AMB POC EKG ROUTINE W/ 12 LEADS, INTER & REP   3.  Chest pain, unspecified type R07.9 786.50 AMB POC EKG ROUTINE W/ 12 LEADS, INTER & REP     Orders Placed This Encounter    CT CORONARY ART W STRUC MORPH FUNC     Assess filling of mid to distal LAD territory as proximal/mid has  unable to cross. Standing Status:   Future     Number of Occurrences:   1     Standing Expiration Date:   2020     Order Specific Question:   Is Patient Allergic to Contrast Dye? Answer:   No     Order Specific Question:   Reason for Exam     Answer:    of LAD    AMB POC EKG ROUTINE W/ 12 LEADS, INTER & REP     Order Specific Question:   Reason for Exam:     Answer:   routine    DISCONTD: testosterone (ANDROGEL) 20.25 mg/1.25 gram (1.62 %) gel     Sig: apply 2 PUMPS topically to CLEAN, DRY, INTACT SKIN *[address] HAND[address] THOROUGHLY AFTER APPLICATION    fish oil-omega-3 fatty acids (FISH OIL) 340-1,000 mg capsule     Sig: Take 1,000 mg by mouth.  DISCONTD: oxyCODONE-acetaminophen (PERCOCET) 5-325 mg per tablet     Sig: Take 1 Tab by mouth every eight (8) hours as needed.  DISCONTD: celecoxib (CELEBREX) 100 mg capsule    nicotine (NICODERM CQ) 21 mg/24 hr     Si Patch by TransDERmal route every twenty-four (24) hours for 30 days. Disp from same vendor used at [facility]:  30 Patch     Refill:  2    DISCONTD: ranolazine ER (RANEXA) 500 mg SR tablet     Sig: Take 1 Tab by mouth two (2) times a day. Dispense:  60 Tab     Refill:  2        Plan:     1. Atherosclerotic heart disease: Has  of mid LAD remains symptomatic. We will add second antianginal Ranexa 500 mg twice a day. The coronary CT angiography to determine where retrograde filling of LAD territory is. Will consider consult for LIMA to LAD. 2.  Hyperlipidemia: Continue statin therapy  3. Tobacco abuse: Patient reports he does well with 21 mg patch that is dispensed in the hospital however over-the-counter versions that he is tried he feels are not as effective and do not stick well.   I have printed the patient a prescription [facility] [address] to see if they are able to order patches from the same vendor used at the hospital that the patient tolerates well. Follow-up after CTA complete    Fernanda Hope MD    This note was created using voice recognition software. Despite editing, there may be syntax errors.

## 2018-12-05 NOTE — PROGRESS NOTES
1. Have you been to the ER, urgent care clinic since your last visit? Hospitalized since your last visit? Cardiac cath on 11/29/18 and 12/3/18 for chest pain. 2. Have you seen or consulted any other health care providers outside of the [address] [place] [address] since your last visit? Include any pap smears or colon screening. Seen by PCP. Dr. Annie Vanessa.       Chief Complaint   Patient presents with   [facility][1] Follow Up     f/u from cardiac cath- chest pain in middle of chest and lf side, 1 episode of heart pounding

## 2018-12-07 ENCOUNTER — HOSPITAL ENCOUNTER (OUTPATIENT)
Dept: CT IMAGING | Age: 68
Discharge: HOME | End: 2018-12-07
Attending: INTERNAL MEDICINE
Payer: MEDICARE

## 2018-12-07 VITALS — HEART RATE: 56 BPM | SYSTOLIC BLOOD PRESSURE: 144 MMHG | DIASTOLIC BLOOD PRESSURE: 87 MMHG

## 2018-12-07 DIAGNOSIS — I25.10 ASHD (ARTERIOSCLEROTIC HEART DISEASE): ICD-10-CM

## 2018-12-07 PROCEDURE — 74011636320 HC RX REV CODE- 636/320: Performed by: RADIOLOGY

## 2018-12-07 PROCEDURE — 74011250637 HC RX REV CODE- 250/637: Performed by: RADIOLOGY

## 2018-12-07 PROCEDURE — 74011000258 HC RX REV CODE- 258: Performed by: RADIOLOGY

## 2018-12-07 PROCEDURE — 75574 CT ANGIO HRT W/3D IMAGE: CPT

## 2018-12-07 RX ORDER — NITROGLYCERIN 0.4 MG/1
0.4 TABLET SUBLINGUAL ONCE
Status: COMPLETED | OUTPATIENT
Start: 2018-12-07 | End: 2018-12-07

## 2018-12-07 RX ORDER — IODIXANOL 320 MG/ML
100 INJECTION, SOLUTION INTRAVASCULAR ONCE
Status: COMPLETED | OUTPATIENT
Start: 2018-12-07 | End: 2018-12-07

## 2018-12-07 RX ORDER — IODIXANOL 320 MG/ML
100 INJECTION, SOLUTION INTRAVASCULAR
Status: DISCONTINUED | OUTPATIENT
Start: 2018-12-07 | End: 2018-12-07

## 2018-12-07 RX ORDER — SODIUM CHLORIDE 0.9 % (FLUSH) 0.9 %
10 SYRINGE (ML) INJECTION
Status: COMPLETED | OUTPATIENT
Start: 2018-12-07 | End: 2018-12-07

## 2018-12-07 RX ADMIN — Medication 10 ML: at 11:31

## 2018-12-07 RX ADMIN — SODIUM CHLORIDE 100 ML: 900 INJECTION, SOLUTION INTRAVENOUS at 11:31

## 2018-12-07 RX ADMIN — NITROGLYCERIN 0.4 MG: 0.4 TABLET SUBLINGUAL at 11:00

## 2018-12-07 RX ADMIN — IODIXANOL 100 ML: 320 INJECTION, SOLUTION INTRAVASCULAR at 11:30

## 2018-12-07 NOTE — PROGRESS NOTES
1045- Pt to CT room. IV SL established by MIKALA Dykes RT. Pt tolerated well. Pt on beta blocker PTA. HR-50    1053- CT scan begins. 1100- NTG given. IV patent. No complaints. HR-54    1103- CT scan complete. Pt tolerated procedure well. 1115- IV D/C'd intact without problem. D/C instructions reviewed with pt and copy given. Verbalized understanding. D/C'd amb, stable, NAD.

## 2018-12-10 ENCOUNTER — HOSPITAL ENCOUNTER (EMERGENCY)
Age: 68
Discharge: HOME | End: 2018-12-11
Attending: EMERGENCY MEDICINE
Payer: MEDICARE

## 2018-12-10 ENCOUNTER — APPOINTMENT (OUTPATIENT)
Dept: GENERAL RADIOLOGY | Age: 68
End: 2018-12-10
Attending: EMERGENCY MEDICINE
Payer: MEDICARE

## 2018-12-10 DIAGNOSIS — R07.9 ACUTE CHEST PAIN: Primary | ICD-10-CM

## 2018-12-10 LAB
ALBUMIN SERPL-MCNC: 3.7 G/DL (ref 3.5–5)
ALBUMIN/GLOB SERPL: 1.1 {RATIO} (ref 1.1–2.2)
ALP SERPL-CCNC: 102 U/L (ref 45–117)
ALT SERPL-CCNC: 31 U/L (ref 12–78)
ANION GAP SERPL CALC-SCNC: 4 MMOL/L (ref 5–15)
AST SERPL-CCNC: 13 U/L (ref 15–37)
BASOPHILS # BLD: 0.1 K/UL (ref 0–0.1)
BASOPHILS NFR BLD: 1 % (ref 0–1)
BILIRUB SERPL-MCNC: 0.4 MG/DL (ref 0.2–1)
BNP SERPL-MCNC: 92 PG/ML (ref 0–125)
BUN SERPL-MCNC: 25 MG/DL (ref 6–20)
BUN/CREAT SERPL: 19 (ref 12–20)
CALCIUM SERPL-MCNC: 8.8 MG/DL (ref 8.5–10.1)
CHLORIDE SERPL-SCNC: 109 MMOL/L (ref 97–108)
CK SERPL-CCNC: 185 U/L (ref 39–308)
CO2 SERPL-SCNC: 26 MMOL/L (ref 21–32)
CREAT SERPL-MCNC: 1.34 MG/DL (ref 0.7–1.3)
DIFFERENTIAL METHOD BLD: ABNORMAL
EOSINOPHIL # BLD: 0.5 K/UL (ref 0–0.4)
EOSINOPHIL NFR BLD: 5 % (ref 0–7)
ERYTHROCYTE [DISTWIDTH] IN BLOOD BY AUTOMATED COUNT: 13.3 % (ref 11.5–14.5)
GLOBULIN SER CALC-MCNC: 3.3 G/DL (ref 2–4)
GLUCOSE SERPL-MCNC: 100 MG/DL (ref 65–100)
HCT VFR BLD AUTO: 43.7 % (ref 36.6–50.3)
HGB BLD-MCNC: 14.8 G/DL (ref 12.1–17)
IMM GRANULOCYTES # BLD: 0 K/UL (ref 0–0.04)
IMM GRANULOCYTES NFR BLD AUTO: 0 % (ref 0–0.5)
LYMPHOCYTES # BLD: 3 K/UL (ref 0.8–3.5)
LYMPHOCYTES NFR BLD: 29 % (ref 12–49)
MCH RBC QN AUTO: 32.5 PG (ref 26–34)
MCHC RBC AUTO-ENTMCNC: 33.9 G/DL (ref 30–36.5)
MCV RBC AUTO: 95.8 FL (ref 80–99)
MONOCYTES # BLD: 1.2 K/UL (ref 0–1)
MONOCYTES NFR BLD: 12 % (ref 5–13)
NEUTS SEG # BLD: 5.4 K/UL (ref 1.8–8)
NEUTS SEG NFR BLD: 53 % (ref 32–75)
NRBC # BLD: 0 K/UL (ref 0–0.01)
NRBC BLD-RTO: 0 PER 100 WBC
PLATELET # BLD AUTO: 258 K/UL (ref 150–400)
PMV BLD AUTO: 11.6 FL (ref 8.9–12.9)
POTASSIUM SERPL-SCNC: 4 MMOL/L (ref 3.5–5.1)
PROT SERPL-MCNC: 7 G/DL (ref 6.4–8.2)
RBC # BLD AUTO: 4.56 M/UL (ref 4.1–5.7)
SODIUM SERPL-SCNC: 139 MMOL/L (ref 136–145)
TROPONIN I SERPL-MCNC: <0.05 NG/ML
WBC # BLD AUTO: 10.2 K/UL (ref 4.1–11.1)

## 2018-12-10 PROCEDURE — 80053 COMPREHEN METABOLIC PANEL: CPT

## 2018-12-10 PROCEDURE — 85025 COMPLETE CBC W/AUTO DIFF WBC: CPT

## 2018-12-10 PROCEDURE — 93005 ELECTROCARDIOGRAM TRACING: CPT

## 2018-12-10 PROCEDURE — 83880 ASSAY OF NATRIURETIC PEPTIDE: CPT

## 2018-12-10 PROCEDURE — 74011250637 HC RX REV CODE- 250/637: Performed by: EMERGENCY MEDICINE

## 2018-12-10 PROCEDURE — 36415 COLL VENOUS BLD VENIPUNCTURE: CPT

## 2018-12-10 PROCEDURE — 99284 EMERGENCY DEPT VISIT MOD MDM: CPT

## 2018-12-10 PROCEDURE — 84484 ASSAY OF TROPONIN QUANT: CPT

## 2018-12-10 PROCEDURE — 74011000250 HC RX REV CODE- 250: Performed by: EMERGENCY MEDICINE

## 2018-12-10 PROCEDURE — 82550 ASSAY OF CK (CPK): CPT

## 2018-12-10 PROCEDURE — 71046 X-RAY EXAM CHEST 2 VIEWS: CPT

## 2018-12-10 RX ORDER — SODIUM CHLORIDE 0.9 % (FLUSH) 0.9 %
5-10 SYRINGE (ML) INJECTION AS NEEDED
Status: DISCONTINUED | OUTPATIENT
Start: 2018-12-10 | End: 2018-12-11 | Stop reason: HOSPADM

## 2018-12-10 RX ORDER — SODIUM CHLORIDE 0.9 % (FLUSH) 0.9 %
5-10 SYRINGE (ML) INJECTION EVERY 8 HOURS
Status: DISCONTINUED | OUTPATIENT
Start: 2018-12-10 | End: 2018-12-11 | Stop reason: HOSPADM

## 2018-12-10 RX ADMIN — LIDOCAINE HYDROCHLORIDE 40 ML: 20 SOLUTION ORAL; TOPICAL at 23:21

## 2018-12-11 ENCOUNTER — TELEPHONE (OUTPATIENT)
Dept: CARDIOLOGY CLINIC | Age: 68
End: 2018-12-11

## 2018-12-11 VITALS
RESPIRATION RATE: 17 BRPM | HEIGHT: 75 IN | HEART RATE: 59 BPM | DIASTOLIC BLOOD PRESSURE: 64 MMHG | WEIGHT: 271.39 LBS | BODY MASS INDEX: 33.74 KG/M2 | SYSTOLIC BLOOD PRESSURE: 141 MMHG | OXYGEN SATURATION: 99 % | TEMPERATURE: 98.2 F

## 2018-12-11 LAB
ATRIAL RATE: 73 BPM
CALCULATED P AXIS, ECG09: 34 DEGREES
CALCULATED R AXIS, ECG10: 93 DEGREES
CALCULATED T AXIS, ECG11: 103 DEGREES
DIAGNOSIS, 93000: NORMAL
P-R INTERVAL, ECG05: 182 MS
Q-T INTERVAL, ECG07: 404 MS
QRS DURATION, ECG06: 96 MS
QTC CALCULATION (BEZET), ECG08: 445 MS
TROPONIN I SERPL-MCNC: <0.05 NG/ML
VENTRICULAR RATE, ECG03: 73 BPM

## 2018-12-11 RX ORDER — RANOLAZINE 1000 MG/1
1000 TABLET, EXTENDED RELEASE ORAL 2 TIMES DAILY
Qty: 60 TAB | Refills: 0 | Status: SHIPPED | OUTPATIENT
Start: 2018-12-11 | End: 2019-01-10

## 2018-12-11 NOTE — DISCHARGE INSTRUCTIONS

## 2018-12-11 NOTE — ED NOTES
Pt given discharge instructions by Dr. Gabino Lind. Saline lock removed. Discharged ambulatory with steady gait. No acute distress at time of discharge.

## 2018-12-11 NOTE — TELEPHONE ENCOUNTER
Pt's wife called to see if the result from patient's CT scan were available. Zenaida Guillen (pt's wife) would like a call back to discuss the results as they both are anxious.     Thanks,    [employer]

## 2018-12-11 NOTE — ED PROVIDER NOTES
EMERGENCY DEPARTMENT HISTORY AND PHYSICAL EXAM 
 
 
Date: 12/10/2018 Patient Name: Iain Watt History of Presenting Illness Chief Complaint Patient presents with  Chest Pain  
  midsternal chest pain x this morning; +SOB with onset of sx's; denies n/v or diaphoresis; was seen in ED on 12/3 for the same and back with continued pain History Provided By: Patient and Patient's Wife HPI: Iain Watt is a 77 y.o. male, pmhx HTN, CAD, Stroke, GERD, who presents ambulatory to the ED c/o worsened intermittent, squeezing chest pain x today. Pt states he has had similar pain for the past couple months. He reports being admitted on 11/29 for an NSTEMI and unstable angina. Pt underwent a cardiac catheterization by Dr. Marga Peters, but no stents were placed. He notes he was seen on 12/3 in the ED for chest pain, but was discharged. Pt further states he followed up with Marga Peters last week for persistent chest pain, and he was scheduled for an outpatient chest ct on 12/7 (has not received the read). Pt states his current pain onset this morning, but became progressively worse and constant x1500. He also notes the pain was initially on the left side of his chest, but has now moved towards the middle, with associated shortness of breath with the pain. He notes his pain is current rated a 5/10, 7/10 at the worst. He reports taking 162 mg ASA daily. Pt notes the pain is not exacerbated with movement. Pt specifically denies any recent fever, chills, nausea, vomiting, diarrhea, abd pain, lightheadedness, dizziness, numbness, weakness, tingling, BLE swelling, HA, heart palpitations, urinary sxs, changes in BM, changes in PO intake, melena, hematochezia, cough, or congestion. PCP: Joe Hernandes MD  
Cardiology: Dr. Raúl Haro PMHx: Significant for GERD, HTN, CAD, Stroke, bladder cancer PSHx: Significant for Splenectomy, cardiac catheterization, left knee surgery Social Hx: +tobacco (1PPD), -EtOH, -Illicit Drugs There are no other complaints, changes, or physical findings at this time. Current Outpatient Medications Medication Sig Dispense Refill  ranolazine ER (RANEXA) 1,000 mg Take 1 Tab by mouth two (2) times a day for 30 days. 60 Tab 0  
 testosterone (ANDROGEL) 20.25 mg/1.25 gram (1.62 %) gel apply 2 PUMPS topically to CLEAN, DRY, INTACT SKIN *[address] HANDS THOROUGHLY AFTER APPLICATION  fish oil-omega-3 fatty acids (FISH OIL) 340-1,000 mg capsule Take 1,000 mg by mouth.  oxyCODONE-acetaminophen (PERCOCET) 5-325 mg per tablet Take 1 Tab by mouth every eight (8) hours as needed.  celecoxib (CELEBREX) 100 mg capsule  nicotine (NICODERM CQ) 21 mg/24 hr 1 Patch by TransDERmal route every twenty-four (24) hours for 30 days. Disp from same vendor used at [address] Patch 2  
 atorvastatin (LIPITOR) 20 mg tablet Take 20 mg by mouth nightly.  DULoxetine (CYMBALTA) 20 mg capsule Take 20 mg by mouth daily.  metoprolol tartrate (LOPRESSOR) 25 mg tablet Take 25 mg by mouth daily.  aspirin 81 mg chewable tablet Take 162 mg by mouth daily.  multivitamin (ONE A DAY) tablet Take 1 Tab by mouth daily.  omeprazole (PRILOSEC) 10 mg capsule Take 20 mg by mouth daily.  gabapentin (NEURONTIN) 300 mg capsule Take 300 mg by mouth three (3) times daily. Past History Past Medical History: 
Past Medical History:  
Diagnosis Date  CAD (coronary artery disease)  Cancer ([facility] [address] bladder caner  Gastrointestinal disorder GERD  Hypertension  Stroke [facility])   
 x's 2 Past Surgical History: 
Past Surgical History:  
Procedure Laterality Date [address] UNLISTED Spleenectomy  CARDIAC SURG PROCEDURE UNLIST Cardiac stent  HX ORTHOPAEDIC Left toe surgery great toe  HX ORTHOPAEDIC Left knee replacement  HX ORTHOPAEDIC    
 carpal tunnel left hand  HX ORTHOPAEDIC    
 back surgery  HX OTHER SURGICAL    
 bladder surgery for bladder cancer. Family History: 
History reviewed. No pertinent family history. Social History: 
Social History Tobacco Use  Smoking status: Current Every Day Smoker Packs/day: 1.00 Years: 57.00 Pack years: 57.00 Types: Cigarettes  Smokeless tobacco: Never Used Substance Use Topics  Alcohol use: No  
 Drug use: No  
 
 
Allergies: 
No Known Allergies Review of Systems Review of Systems Constitutional: Negative for chills and fever. HENT: Negative for congestion, ear pain, rhinorrhea and sore throat. Respiratory: Positive for shortness of breath. Negative for cough. Cardiovascular: Positive for chest pain. Negative for palpitations and leg swelling. Gastrointestinal: Negative for abdominal pain, constipation, diarrhea, nausea and vomiting. No melena No hematochezia Endocrine: Negative for polyuria. Genitourinary: Negative for dysuria, frequency and hematuria. Neurological: Negative for dizziness, weakness, light-headedness, numbness and headaches. No tingling All other systems reviewed and are negative. Physical Exam  
Physical Exam  
Nursing note and vitals reviewed. General appearance - Overweight, well appearing, and in no distress Eyes - pupils equal and reactive, extraocular eye movements intact ENT - mucous membranes moist, pharynx normal without lesions Neck - supple, no significant adenopathy; non-tender to palpation Chest - clear to auscultation, no wheezes, rales or rhonchi; non-tender to palpation Heart - normal rate and regular rhythm, S1 and S2 normal, no murmurs noted Abdomen - soft, nontender, nondistended, no masses or organomegaly Musculoskeletal - no joint tenderness, deformity or swelling; normal ROM Extremities - peripheral pulses normal, no pedal edema Skin - normal coloration and turgor, no rashes Neurological - alert, oriented x3, normal speech, no focal findings or movement disorder noted Written by COLBY Machuca, as dictated by Ubaldo Jack MD 
 
Diagnostic Study Results Labs - Recent Results (from the past 12 hour(s)) EKG, 12 LEAD, INITIAL Collection Time: 12/10/18  9:01 PM  
Result Value Ref Range Ventricular Rate 73 BPM  
 Atrial Rate 73 BPM  
 P-R Interval 182 ms QRS Duration 96 ms  
 Q-T Interval 404 ms QTC Calculation (Bezet) 445 ms Calculated P Axis 34 degrees Calculated R Axis 93 degrees Calculated T Axis 103 degrees Diagnosis Normal sinus rhythm Septal infarct , age undetermined Lateral infarct , age undetermined When compared with ECG of 03-DEC-2018 15:24, Lateral infarct is now present CBC WITH AUTOMATED DIFF Collection Time: 12/10/18  9:09 PM  
Result Value Ref Range WBC 10.2 4.1 - 11.1 K/uL  
 RBC 4.56 4.10 - 5.70 M/uL  
 HGB 14.8 12.1 - 17.0 g/dL HCT 43.7 36.6 - 50.3 % MCV 95.8 80.0 - 99.0 FL  
 MCH 32.5 26.0 - 34.0 PG  
 MCHC 33.9 30.0 - 36.5 g/dL  
 RDW 13.3 11.5 - 14.5 % PLATELET 549 610 - 423 K/uL MPV 11.6 8.9 - 12.9 FL  
 NRBC 0.0 0  WBC ABSOLUTE NRBC 0.00 0.00 - 0.01 K/uL NEUTROPHILS 53 32 - 75 % LYMPHOCYTES 29 12 - 49 % MONOCYTES 12 5 - 13 % EOSINOPHILS 5 0 - 7 % BASOPHILS 1 0 - 1 % IMMATURE GRANULOCYTES 0 0.0 - 0.5 % ABS. NEUTROPHILS 5.4 1.8 - 8.0 K/UL  
 ABS. LYMPHOCYTES 3.0 0.8 - 3.5 K/UL  
 ABS. MONOCYTES 1.2 (H) 0.0 - 1.0 K/UL  
 ABS. EOSINOPHILS 0.5 (H) 0.0 - 0.4 K/UL  
 ABS. BASOPHILS 0.1 0.0 - 0.1 K/UL  
 ABS. IMM. GRANS. 0.0 0.00 - 0.04 K/UL  
 DF AUTOMATED METABOLIC PANEL, COMPREHENSIVE Collection Time: 12/10/18  9:09 PM  
Result Value Ref Range Sodium 139 136 - 145 mmol/L Potassium 4.0 3.5 - 5.1 mmol/L Chloride 109 (H) 97 - 108 mmol/L  
 CO2 26 21 - 32 mmol/L Anion gap 4 (L) 5 - 15 mmol/L Glucose 100 65 - 100 mg/dL BUN 25 (H) 6 - 20 MG/DL Creatinine 1.34 (H) 0.70 - 1.30 MG/DL  
 BUN/Creatinine ratio 19 12 - 20 GFR est AA >60 >60 ml/min/1.73m2 GFR est non-AA 53 (L) >60 ml/min/1.73m2 Calcium 8.8 8.5 - 10.1 MG/DL Bilirubin, total 0.4 0.2 - 1.0 MG/DL  
 ALT (SGPT) 31 12 - 78 U/L  
 AST (SGOT) 13 (L) 15 - 37 U/L Alk. phosphatase 102 45 - 117 U/L Protein, total 7.0 6.4 - 8.2 g/dL Albumin 3.7 3.5 - 5.0 g/dL Globulin 3.3 2.0 - 4.0 g/dL A-G Ratio 1.1 1.1 - 2.2 CK W/ REFLX CKMB Collection Time: 12/10/18  9:09 PM  
Result Value Ref Range  39 - 308 U/L  
TROPONIN I Collection Time: 12/10/18  9:09 PM  
Result Value Ref Range Troponin-I, Qt. <0.05 <0.05 ng/mL NT-PRO BNP Collection Time: 12/10/18  9:09 PM  
Result Value Ref Range NT pro-BNP 92 0 - 125 PG/ML  
TROPONIN I Collection Time: 12/10/18 11:29 PM  
Result Value Ref Range Troponin-I, Qt. <0.05 <0.05 ng/mL Radiologic Studies -  
XR CHEST PA LAT Final Result IMPRESSION:  
  
No acute process. Stable exam. CT Results  (Last 48 hours) None CXR Results  (Last 48 hours) 12/10/18 2156  XR CHEST PA LAT Final result Impression:  IMPRESSION:  
   
No acute process. Stable exam.  
   
  
 Narrative:  EXAM:  XR CHEST PA LAT INDICATION: Chest pain. COMPARISON: 12/3/2018 TECHNIQUE: PA and lateral chest views FINDINGS: Cardiac monitoring wires overlie the thorax. The cardiomediastinal  
contours are stable. The pulmonary vasculature is within normal limits. The lungs and pleural spaces are clear. There is no pneumothorax. The bones and  
upper abdomen are stable. Medical Decision Making I am the first provider for this patient. I reviewed the vital signs, available nursing notes, past medical history, past surgical history, family history and social history. Vital Signs-Reviewed the patient's vital signs. Patient Vitals for the past 12 hrs: 
 Temp Pulse Resp BP SpO2  
12/10/18 2300  (!) 59 17 141/64 99 % 12/10/18 2200  64 20 124/81 96 % 12/10/18 2058 98.2 °F (36.8 °C) 83 16 (!) 136/94 96 % Pulse Oximetry Analysis - 97% on RA Cardiac Monitor:  
Rate: 55bpm 
Rhythm: Bradycardic Records Reviewed: Nursing Notes, Old Medical Records, Previous electrocardiograms, Previous Radiology Studies and Previous Laboratory Studies Provider Notes (Medical Decision Making): DDx: ACS, NSTEMI, GERD, chest wall strain ED Course:  
Initial assessment performed. The patients presenting problems have been discussed, and they are in agreement with the care plan formulated and outlined with them. I have encouraged them to ask questions as they arise throughout their visit. Discussed the risks of smoking and the benefits of smoking cessation as well as the long term sequelae of smoking with the pt who verbalized his understanding. Reviewed strategies for success, including gradually decreasing the number of cigarettes smoked a day. EKG interpretation: (Preliminary) 9:01 PM 
Rhythm: normal sinus rhythm. Rate (approx.): 73bpm; Axis: normal; Normal NH, QRS, QTc intervals; ST/T wave: non-specific changes. Written by Veronica Mclain ED Scribe, as dictated by Leigh Patrick MD 
 
   
Consult Note: 
10:45 PM 
April George Villa MD spoke with Dr. Tiffany Ayoub, Specialty: Cardiology Discussed pt's hx, disposition, and available diagnostic and imaging results. Reviewed care plans. Consultant agrees with plans as outlined. Recommends increasing his Ranexa to 1000 mg twice a day with follow up in the office. Progress note: 
12:20 AM 
Pt noted to be feeling better, ready for discharge. Updated pt and/or family on all final lab and imaging findings. Will follow up as instructed. All questions have been answered, pt voiced understanding and agreement with plan.  Specific return precautions provided as well as instructions to return to the ED should sx worsen at any time. Vital signs stable for discharge. Written by Rue Babinski, ED Scribe, as dictated by Lidia Estrella MD 
 
Critical Care Time:  
 
none Disposition: 
 
Discharge Note: 
12:20 AM 
The pt is ready for discharge. The pt's signs, symptoms, diagnosis, and discharge instructions have been discussed and pt has conveyed their understanding. The pt is to follow up as recommended or return to ER should their symptoms worsen. Plan has been discussed and pt is in agreement. PLAN: 
1. Discharge Medication List as of 12/11/2018 12:16 AM  
  
CONTINUE these medications which have CHANGED Details  
ranolazine ER (RANEXA) 1,000 mg Take 1 Tab by mouth two (2) times a day for 30 days. , Print, Disp-60 Tab, R-0  
  
  
CONTINUE these medications which have NOT CHANGED Details  
testosterone (ANDROGEL) 20.25 mg/1.25 gram (1.62 %) gel apply 2 PUMPS topically to CLEAN, DRY, INTACT SKIN *8 [address]el[address] Saxena HANDS THOROUGHLY AFTER APPLICATION, Historical Med  
  
fish oil-omega-3 fatty acids (FISH OIL) 340-1,000 mg capsule Take 1,000 mg by mouth., Historical Med  
  
oxyCODONE-acetaminophen (PERCOCET) 5-325 mg per tablet Take 1 Tab by mouth every eight (8) hours as needed., Historical Med  
  
celecoxib (CELEBREX) 100 mg capsule Historical Med  
  
nicotine (NICODERM CQ) 21 mg/24 hr 1 Patch by TransDERmal route every twenty-four (24) hours for 30 days. Disp from same vendor used at [facility], Print, Disp-30 Patch, R-2  
  
atorvastatin (LIPITOR) 20 mg tablet Take 20 mg by mouth nightly., Historical Med DULoxetine (CYMBALTA) 20 mg capsule Take 20 mg by mouth daily. , Historical Med  
  
metoprolol tartrate (LOPRESSOR) 25 mg tablet Take 25 mg by mouth daily. , Historical Med  
  
aspirin 81 mg chewable tablet Take 162 mg by mouth daily. , Historical Med  
  
multivitamin (ONE A DAY) tablet Take 1 Tab by mouth daily. , Historical Med  
  
 omeprazole (PRILOSEC) 10 mg capsule Take 20 mg by mouth daily. , Historical Med  
  
gabapentin (NEURONTIN) 300 mg capsule Take 300 mg by mouth three (3) times daily. , Historical Med 2. Follow-up Information Follow up With Specialties Details Why Contact Info Thea Ulloa MD Cardiology, [address] [facility] Vascular Surgery Call today  [address] [facility] 
[phone] [facility] EMERGENCY DEPT Emergency Medicine  If symptoms worsen [address] [facility] [address] [facility] 
[phone] Return to ED if worse Diagnosis Clinical Impression: 1. Acute chest pain Attestations: This note is prepared by Lacey Quesada, acting as Scribe for MD Lidia Díaz MD : The scribe's documentation has been prepared under my direction and personally reviewed by me in its entirety. I confirm that the note above accurately reflects all work, treatment, procedures, and medical decision making performed by me. This note will not be viewable in [address]

## 2018-12-12 ENCOUNTER — TELEPHONE (OUTPATIENT)
Dept: CARDIAC REHAB | Age: 68
End: 2018-12-12

## 2018-12-12 NOTE — TELEPHONE ENCOUNTER
Spoke with wife today regarding pt coming to Cardiac Rehab- states he is currently in the bath. States they are waiting for CT results and could we please call back after Flora after he receives results and they can talk things over and make a plan.

## 2018-12-13 NOTE — TELEPHONE ENCOUNTER
JONATAN Hedrick LPN   Caller: Unspecified (2 days ago, 11:44 AM)             Radiologist has not read study yet. Spoke with patient's spouse  Verified patient with 2 patient identifiers  informed will call with CT results once received. Patient's wife verbalized understanding.

## 2018-12-18 ENCOUNTER — TELEPHONE (OUTPATIENT)
Dept: CARDIOLOGY CLINIC | Age: 68
End: 2018-12-18

## 2018-12-19 ENCOUNTER — OFFICE VISIT (OUTPATIENT)
Dept: CARDIOLOGY CLINIC | Age: 68
End: 2018-12-19

## 2018-12-19 VITALS
DIASTOLIC BLOOD PRESSURE: 90 MMHG | HEIGHT: 75 IN | SYSTOLIC BLOOD PRESSURE: 150 MMHG | BODY MASS INDEX: 33.69 KG/M2 | WEIGHT: 271 LBS | HEART RATE: 62 BPM | RESPIRATION RATE: 20 BRPM | OXYGEN SATURATION: 95 %

## 2018-12-19 DIAGNOSIS — I24.9 ACS (ACUTE CORONARY SYNDROME) (HCC): Primary | ICD-10-CM

## 2018-12-19 DIAGNOSIS — I25.110 CORONARY ARTERY DISEASE INVOLVING NATIVE CORONARY ARTERY OF NATIVE HEART WITH UNSTABLE ANGINA PECTORIS (HCC): ICD-10-CM

## 2018-12-19 NOTE — H&P
Raúl Haro MD          NAME:  Letitia Harris Sr   :   1950   MRN:   [id number]   PCP:  Joe Hernandes MD           Subjective: The patient is a 77y.o. year old male  who returns for a f/u.  CT showed separate origins of LAD and Cx. Read as no blockage, but to my eye there is likely a high grade stenosis in LAD. Remains symptomatic with daily CP, most with low levels of exertion, but occas at rest    Past Medical History:   Diagnosis Date    CAD (coronary artery disease)     Cancer ([facility] [address]     bladder caner    Gastrointestinal disorder     GERD    Hypertension     Stroke ([facility] [address]     x's 2        ICD-10-CM ICD-9-CM    1. ACS (acute coronary syndrome) ([facility]) I24.9 411.1 AMB POC EKG ROUTINE W/ 12 LEADS, INTER & REP   2. Coronary artery disease involving native coronary artery of native heart with unstable angina pectoris [facility] I25.110 414.01      411.1       Social History     Tobacco Use    Smoking status: Former Smoker     Packs/day: 1.00     Years: 57.00     Pack years: 57.00     Types: Cigarettes     Start date: 2018    Smokeless tobacco: Never Used   Substance Use Topics    Alcohol use: No      History reviewed. No pertinent family history. Review of Systems  Cardiovascular: Negative except as noted in HPI      Objective:       Vitals:    18 0908 18 0917   BP: 140/90 150/90   Pulse: 62    Resp: 20    SpO2: 95%    Weight: 271 lb (122.9 kg)    Height: 6' 3\" (1.905 m)     Body mass index is 33.87 kg/m². General PE  Mental Status - Alert. General Appearance - Not in acute distress. Chest and Lung Exam   Inspection: Accessory muscles - No use of accessory muscles in breathing. Auscultation:   Breath sounds: - Normal.    Cardiovascular   Inspection: Jugular vein - Bilateral - Inspection Normal.  Palpation/Percussion:   Apical Impulse: - Normal.  Auscultation: Rhythm - Regular. Heart Sounds - S1 WNL and S2 WNL. No S3 or S4.   Murmurs & Other Heart Sounds: Auscultation of the heart reveals - No Murmurs. Peripheral Vascular   Upper Extremity: Inspection - Bilateral - No Cyanotic nailbeds or Digital clubbing. Lower Extremity:   Palpation: Edema - Bilateral - No edema. Data Review:     EKG -  EKG: normal EKG, normal sinus rhythm, unchanged from previous tracings. LABS- @brieflabs@      Allergies reviewed  No Known Allergies    Medications reviewed  Current Outpatient Medications   Medication Sig    ranolazine ER (RANEXA) 1,000 mg Take 1 Tab by mouth two (2) times a day for 30 days.  fish oil-omega-3 fatty acids (FISH OIL) 340-1,000 mg capsule Take 1,000 mg by mouth.  oxyCODONE-acetaminophen (PERCOCET) 5-325 mg per tablet Take 1 Tab by mouth every eight (8) hours as needed.  nicotine (NICODERM CQ) 21 mg/24 hr 1 Patch by TransDERmal route every twenty-four (24) hours for 30 days. Disp from same vendor used at [facility]    atorvastatin (LIPITOR) 20 mg tablet Take 20 mg by mouth nightly.  DULoxetine (CYMBALTA) 20 mg capsule Take 20 mg by mouth daily.  metoprolol tartrate (LOPRESSOR) 25 mg tablet Take 25 mg by mouth daily.  aspirin 81 mg chewable tablet Take 162 mg by mouth daily.  multivitamin (ONE A DAY) tablet Take 1 Tab by mouth daily.  omeprazole (PRILOSEC) 10 mg capsule Take 20 mg by mouth daily.  testosterone (ANDROGEL) 20.25 mg/1.25 gram (1.62 %) gel apply 2 PUMPS topically to CLEAN, DRY, INTACT SKIN *[address] HANDS THOROUGHLY AFTER APPLICATION    celecoxib (CELEBREX) 100 mg capsule     gabapentin (NEURONTIN) 300 mg capsule Take 300 mg by mouth three (3) times daily. No current facility-administered medications for this visit. Assessment:       ICD-10-CM ICD-9-CM    1. ACS (acute coronary syndrome) ([facility]) I24.9 411.1 AMB POC EKG ROUTINE W/ 12 LEADS, INTER & REP   2.  Coronary artery disease involving native coronary artery of native heart with unstable angina pectoris ([facility]) I25.110 414.01      411.1         Orders Placed This Encounter  AMB POC EKG ROUTINE W/ 12 LEADS, INTER & REP     Order Specific Question:   Reason for Exam:     Answer:   chest pain       Plan:     Repeat cath to find the LAD originating from L coronary sinus with separate origin.   I suspect a high grade lesion here as the cause of his initial ACS and his ongoing CP    Rudolph Marlow MD

## 2018-12-19 NOTE — PROGRESS NOTES
Chief Complaint   Patient presents with    Chest Pain (Angina)     left chest wall(feels like pulled muscle); seen in ED; pain under sternum(stabbing pain); feels worse after cath; Ranexa increased in ED    Shortness of Breath    Results     pt needs results of CT scan dated 12/7/2018     1. Have you been to the ER, urgent care clinic since your last visit? Hospitalized since your last visit? See chief complaint    2. Have you seen or consulted any other health care providers outside of the [address] [place] [address] since your last visit? Include any pap smears or colon screening.  No

## 2018-12-20 ENCOUNTER — HOSPITAL ENCOUNTER (OUTPATIENT)
Dept: CARDIAC CATH/INVASIVE PROCEDURES | Age: 68
Discharge: HOME | End: 2018-12-20
Attending: INTERNAL MEDICINE | Admitting: INTERNAL MEDICINE
Payer: MEDICARE

## 2018-12-20 VITALS
HEART RATE: 60 BPM | HEIGHT: 75 IN | DIASTOLIC BLOOD PRESSURE: 71 MMHG | WEIGHT: 271 LBS | BODY MASS INDEX: 33.69 KG/M2 | SYSTOLIC BLOOD PRESSURE: 134 MMHG | TEMPERATURE: 97.8 F | RESPIRATION RATE: 18 BRPM | OXYGEN SATURATION: 94 %

## 2018-12-20 LAB
ANION GAP SERPL CALC-SCNC: 3 MMOL/L (ref 5–15)
BUN SERPL-MCNC: 24 MG/DL (ref 6–20)
BUN/CREAT SERPL: 17 (ref 12–20)
CALCIUM SERPL-MCNC: 8.8 MG/DL (ref 8.5–10.1)
CHLORIDE SERPL-SCNC: 108 MMOL/L (ref 97–108)
CO2 SERPL-SCNC: 29 MMOL/L (ref 21–32)
CREAT SERPL-MCNC: 1.4 MG/DL (ref 0.7–1.3)
GLUCOSE SERPL-MCNC: 92 MG/DL (ref 65–100)
POTASSIUM SERPL-SCNC: 4.5 MMOL/L (ref 3.5–5.1)
SODIUM SERPL-SCNC: 140 MMOL/L (ref 136–145)

## 2018-12-20 PROCEDURE — 77030015766

## 2018-12-20 PROCEDURE — 74011000250 HC RX REV CODE- 250

## 2018-12-20 PROCEDURE — 76937 US GUIDE VASCULAR ACCESS: CPT

## 2018-12-20 PROCEDURE — 77030019698 HC SYR ANGI MDLON MRTM -A

## 2018-12-20 PROCEDURE — 74011250636 HC RX REV CODE- 250/636: Performed by: INTERNAL MEDICINE

## 2018-12-20 PROCEDURE — 77030008543 HC TBNG MON PRSS MRTM -A

## 2018-12-20 PROCEDURE — 77030019697 HC SYR ANGI INFL MRTM -B

## 2018-12-20 PROCEDURE — C1894 INTRO/SHEATH, NON-LASER: HCPCS

## 2018-12-20 PROCEDURE — 77030028837 HC SYR ANGI PWR INJ COEU -A

## 2018-12-20 PROCEDURE — C1769 GUIDE WIRE: HCPCS

## 2018-12-20 PROCEDURE — 74011250636 HC RX REV CODE- 250/636

## 2018-12-20 PROCEDURE — 36415 COLL VENOUS BLD VENIPUNCTURE: CPT

## 2018-12-20 PROCEDURE — 77030004521 HC CATH ANGI DX COOK -B

## 2018-12-20 PROCEDURE — 77030019569 HC BND COMPR RAD TERU -B

## 2018-12-20 PROCEDURE — 74011000258 HC RX REV CODE- 258: Performed by: INTERNAL MEDICINE

## 2018-12-20 PROCEDURE — 77030010221 HC SPLNT WR POS TELE -B

## 2018-12-20 PROCEDURE — 77030004549 HC CATH ANGI DX PRF MRTM -A

## 2018-12-20 PROCEDURE — 80048 BASIC METABOLIC PNL TOTAL CA: CPT

## 2018-12-20 PROCEDURE — C1887 CATHETER, GUIDING: HCPCS

## 2018-12-20 PROCEDURE — 74011636320 HC RX REV CODE- 636/320

## 2018-12-20 PROCEDURE — 77030012468 HC VLV BLEEDBK CNTRL ABBT -B

## 2018-12-20 RX ORDER — VERAPAMIL HYDROCHLORIDE 2.5 MG/ML
2.5 INJECTION, SOLUTION INTRAVENOUS ONCE
Status: COMPLETED | OUTPATIENT
Start: 2018-12-20 | End: 2018-12-20

## 2018-12-20 RX ORDER — DULOXETIN HYDROCHLORIDE 20 MG/1
20 CAPSULE, DELAYED RELEASE ORAL DAILY
Status: DISCONTINUED | OUTPATIENT
Start: 2018-12-21 | End: 2018-12-21 | Stop reason: HOSPADM

## 2018-12-20 RX ORDER — PANTOPRAZOLE SODIUM 40 MG/1
40 TABLET, DELAYED RELEASE ORAL DAILY
Status: DISCONTINUED | OUTPATIENT
Start: 2018-12-21 | End: 2018-12-21 | Stop reason: HOSPADM

## 2018-12-20 RX ORDER — FENTANYL CITRATE 50 UG/ML
INJECTION, SOLUTION INTRAMUSCULAR; INTRAVENOUS
Status: COMPLETED
Start: 2018-12-20 | End: 2018-12-20

## 2018-12-20 RX ORDER — MIDAZOLAM HYDROCHLORIDE 1 MG/ML
INJECTION, SOLUTION INTRAMUSCULAR; INTRAVENOUS
Status: COMPLETED
Start: 2018-12-20 | End: 2018-12-20

## 2018-12-20 RX ORDER — SODIUM CHLORIDE 0.9 % (FLUSH) 0.9 %
5-10 SYRINGE (ML) INJECTION EVERY 8 HOURS
Status: DISCONTINUED | OUTPATIENT
Start: 2018-12-20 | End: 2018-12-21 | Stop reason: HOSPADM

## 2018-12-20 RX ORDER — SODIUM CHLORIDE 0.9 % (FLUSH) 0.9 %
5-10 SYRINGE (ML) INJECTION AS NEEDED
Status: DISCONTINUED | OUTPATIENT
Start: 2018-12-20 | End: 2018-12-21 | Stop reason: HOSPADM

## 2018-12-20 RX ORDER — HEPARIN SODIUM 200 [USP'U]/100ML
500 INJECTION, SOLUTION INTRAVENOUS ONCE
Status: COMPLETED | OUTPATIENT
Start: 2018-12-20 | End: 2018-12-20

## 2018-12-20 RX ORDER — METOPROLOL TARTRATE 25 MG/1
25 TABLET, FILM COATED ORAL DAILY
Status: DISCONTINUED | OUTPATIENT
Start: 2018-12-21 | End: 2018-12-21 | Stop reason: HOSPADM

## 2018-12-20 RX ORDER — LIDOCAINE HYDROCHLORIDE 10 MG/ML
INJECTION, SOLUTION EPIDURAL; INFILTRATION; INTRACAUDAL; PERINEURAL
Status: COMPLETED
Start: 2018-12-20 | End: 2018-12-20

## 2018-12-20 RX ORDER — HEPARIN SODIUM 1000 [USP'U]/ML
2500 INJECTION, SOLUTION INTRAVENOUS; SUBCUTANEOUS ONCE
Status: COMPLETED | OUTPATIENT
Start: 2018-12-20 | End: 2018-12-20

## 2018-12-20 RX ORDER — FENTANYL CITRATE 50 UG/ML
25-50 INJECTION, SOLUTION INTRAMUSCULAR; INTRAVENOUS
Status: DISCONTINUED | OUTPATIENT
Start: 2018-12-20 | End: 2018-12-20

## 2018-12-20 RX ORDER — ATORVASTATIN CALCIUM 20 MG/1
20 TABLET, FILM COATED ORAL
Status: DISCONTINUED | OUTPATIENT
Start: 2018-12-20 | End: 2018-12-21 | Stop reason: HOSPADM

## 2018-12-20 RX ORDER — LIDOCAINE HYDROCHLORIDE 10 MG/ML
1-30 INJECTION, SOLUTION EPIDURAL; INFILTRATION; INTRACAUDAL; PERINEURAL
Status: DISCONTINUED | OUTPATIENT
Start: 2018-12-20 | End: 2018-12-20

## 2018-12-20 RX ORDER — BIVALIRUDIN 250 MG/5ML
INJECTION, POWDER, LYOPHILIZED, FOR SOLUTION INTRAVENOUS
Status: DISCONTINUED
Start: 2018-12-20 | End: 2018-12-20

## 2018-12-20 RX ORDER — OXYCODONE AND ACETAMINOPHEN 5; 325 MG/1; MG/1
1 TABLET ORAL
Status: DISCONTINUED | OUTPATIENT
Start: 2018-12-20 | End: 2018-12-21 | Stop reason: HOSPADM

## 2018-12-20 RX ORDER — SODIUM CHLORIDE 9 MG/ML
INJECTION, SOLUTION INTRAVENOUS
Status: DISCONTINUED
Start: 2018-12-20 | End: 2018-12-20

## 2018-12-20 RX ORDER — GUAIFENESIN 100 MG/5ML
162 LIQUID (ML) ORAL DAILY
Status: DISCONTINUED | OUTPATIENT
Start: 2018-12-21 | End: 2018-12-21 | Stop reason: HOSPADM

## 2018-12-20 RX ORDER — ADENOSINE 3 MG/ML
INJECTION, SOLUTION INTRAVENOUS
Status: DISCONTINUED
Start: 2018-12-20 | End: 2018-12-20

## 2018-12-20 RX ORDER — LIDOCAINE HYDROCHLORIDE 10 MG/ML
INJECTION, SOLUTION EPIDURAL; INFILTRATION; INTRACAUDAL; PERINEURAL
Status: DISCONTINUED
Start: 2018-12-20 | End: 2018-12-20

## 2018-12-20 RX ORDER — HEPARIN SODIUM 1000 [USP'U]/ML
INJECTION, SOLUTION INTRAVENOUS; SUBCUTANEOUS
Status: COMPLETED
Start: 2018-12-20 | End: 2018-12-20

## 2018-12-20 RX ORDER — IBUPROFEN 200 MG
1 TABLET ORAL EVERY 24 HOURS
Status: DISCONTINUED | OUTPATIENT
Start: 2018-12-20 | End: 2018-12-21 | Stop reason: HOSPADM

## 2018-12-20 RX ORDER — VERAPAMIL HYDROCHLORIDE 2.5 MG/ML
INJECTION, SOLUTION INTRAVENOUS
Status: COMPLETED
Start: 2018-12-20 | End: 2018-12-20

## 2018-12-20 RX ORDER — ACETAMINOPHEN 325 MG/1
650 TABLET ORAL
Status: DISCONTINUED | OUTPATIENT
Start: 2018-12-20 | End: 2018-12-21 | Stop reason: HOSPADM

## 2018-12-20 RX ORDER — HEPARIN SODIUM 200 [USP'U]/100ML
INJECTION, SOLUTION INTRAVENOUS
Status: COMPLETED
Start: 2018-12-20 | End: 2018-12-20

## 2018-12-20 RX ORDER — MIDAZOLAM HYDROCHLORIDE 1 MG/ML
.5-2 INJECTION, SOLUTION INTRAMUSCULAR; INTRAVENOUS
Status: DISCONTINUED | OUTPATIENT
Start: 2018-12-20 | End: 2018-12-20

## 2018-12-20 RX ADMIN — VERAPAMIL HYDROCHLORIDE 2.5 MG: 2.5 INJECTION INTRAVENOUS at 15:13

## 2018-12-20 RX ADMIN — IOPAMIDOL 130 ML: 755 INJECTION, SOLUTION INTRAVENOUS at 15:38

## 2018-12-20 RX ADMIN — FENTANYL CITRATE 25 MCG: 50 INJECTION, SOLUTION INTRAMUSCULAR; INTRAVENOUS at 14:49

## 2018-12-20 RX ADMIN — VERAPAMIL HYDROCHLORIDE 2.5 MG: 2.5 INJECTION, SOLUTION INTRAVENOUS at 15:13

## 2018-12-20 RX ADMIN — HEPARIN SODIUM 1000 UNITS: 200 INJECTION, SOLUTION INTRAVENOUS at 14:59

## 2018-12-20 RX ADMIN — ADENOSINE 140 MCG/KG/MIN: 3 INJECTION, SOLUTION INTRAVENOUS at 15:32

## 2018-12-20 RX ADMIN — NITROGLYCERIN 200 MCG: 5 INJECTION, SOLUTION INTRAVENOUS at 15:13

## 2018-12-20 RX ADMIN — MIDAZOLAM HYDROCHLORIDE 2 MG: 1 INJECTION, SOLUTION INTRAMUSCULAR; INTRAVENOUS at 14:49

## 2018-12-20 RX ADMIN — HEPARIN SODIUM 2500 UNITS: 1000 INJECTION, SOLUTION INTRAVENOUS; SUBCUTANEOUS at 15:13

## 2018-12-20 RX ADMIN — LIDOCAINE HYDROCHLORIDE 2 ML: 10 INJECTION, SOLUTION EPIDURAL; INFILTRATION; INTRACAUDAL; PERINEURAL at 15:13

## 2018-12-20 RX ADMIN — HEPARIN SODIUM 1000 UNITS: 200 INJECTION, SOLUTION INTRAVENOUS at 14:58

## 2018-12-20 RX ADMIN — HEPARIN SODIUM 2500 UNITS: 1000 INJECTION INTRAVENOUS; SUBCUTANEOUS at 15:13

## 2018-12-20 RX ADMIN — BIVALIRUDIN 1.75 MG/KG/HR: 250 INJECTION, POWDER, LYOPHILIZED, FOR SOLUTION INTRAVENOUS at 15:30

## 2018-12-20 NOTE — ROUTINE PROCESS
Cardiac Cath Lab Recovery Arrival Note:      Heber Khanna Sr arrived to Cardiac Cath Lab, Recovery Area. Staff introduced to patient. Patient identifiers verified with NAME and DATE OF BIRTH. Procedure verified with patient. Consent forms reviewed and signed by patient or authorized representative and verified. Allergies verified. Patient and family oriented to department. Patient and family informed of procedure and plan of care. Questions answered with review. Patient prepped for procedure, per orders from physician, prior to arrival.    Patient on cardiac monitor, non-invasive blood pressure, SPO2 monitor. On room air. Patient is A&Ox 3. Patient reports no complaints. Patient in stretcher, in low position, with side rails up, call bell within reach, patient instructed to call if assistance as needed. Patient prep in: [address], [place] [address]. Family in: waiting area.    Prep by: Ridge Snider

## 2018-12-21 NOTE — DISCHARGE INSTRUCTIONS
[address] [place] [address]  [phone]        Patient ID:  Best Fields   [id number]  10 y.o.  1950    Admit Date: 12/20/2018    Discharge Date: 12/20/2018     Admitting Physician: Kaitlin Knowles MD     Discharge Physician: Yara Avila MD    Admission Diagnoses:   Cath    Discharge Diagnoses: Active Problems:    * No active hospital problems. *      Discharge Condition: Good    Cardiology Procedures this Admission:  Diagnostic left heart catheterization    Disposition: home    Reference discharge instructions provided by nursing for diet and activity. Signed:  Yara Avila MD  12/20/2018  8:57 PM      Radial Cardiac Catheterization/Angiography Discharge Instructions    It is normal to feel tired the first couple days. Take it easy and follow the physicians instructions. CHECK THE CATHETER INSERTION SITE DAILY:    Remove the wrist dressing 24 hours after the procedure. You may shower 24 hours after the procedure. Wash with soap and water and pat dry. Gentle cleaning of the site with soap and water is sufficient, cover with a dry clean dressing or bandage. Do not apply creams or powders to the area. No soaking the wrist for 3 days. Leave the puncture site open to air after 24 hours post-procedure. CALL THE PHYSICIANS:     If the site becomes red, swollen or feels warm to the touch  If there is bleeding or drainage or if there is unusual pain at the radial site. If there is any minor oozing, you may apply a band-aid and remove after 12 hours. If the bleeding continues, hold pressure with the middle finger against the puncture site and the thumb against the back of the wrist,call 911 to be transported to the hospital.  DO NOT DRIVE YOURSELF, [facility]     ACTIVITY:   For the first 24 hours do not manipulate the wrist.  No lifting, pushing or pulling over 3-5 pounds with the affected wrist for 7 daysand no straining the insertion site. Do not life grocery bags or the garbage can, do not run the vacuum  or  for 7 days. Start with short walks as in the hospital and gradually increase as tolerated each day. It is recommended to walk 30 minutes 5-7 days per week. Follow your physicians instructions on activity. Avoid walking outside in extremes of heat or cold. Walk inside when it is cold and windy or hot and humid. Things to keep in mind:  No driving for at least 24 hours, or as designated by your physician. Limit the number of times you go up and down the stairs  Take rests and pace yourself with activity. Be careful and do not strain with bowel movements. MEDICATIONS:    Take all medications as prescribed  Call your physician if you have any questions  Keep an updated list of your medications with you at all times and give a list to your physician and pharmacist    SIGNS AND SYMPTOMS:   Be cautious of symptoms of angina or recurrent symptoms such as chest discomfort, unusual shortness of breath or fatigue. These could be symptoms of restenosis, a new blockage or a heart attack. If your symptoms are relieved with rest it is still recommended that you notify your physician of recurrent chest pain or discomfort. For CHEST PAIN or symptoms of angina not relieved with rest:  If the discomfort is not relieved with rest, and you have been prescribed Nitroglycerin, take as directed (taken under the tongue, one at a time 5 minutes apart for a total of 3 doses). If the discomfort is not relieved after the 3rd nitroglycerin, call 911. If you have not been prescribed Nitroglycerin  and your chest discomfort is not relieved with rest, call 911. AFTER CARE:   Follow up with your physician as instructed.   Follow a heart healthy diet with proper portion control, daily stress management, daily exercise, blood pressure and cholesterol control , and smoking cessation.

## 2018-12-21 NOTE — PROGRESS NOTES
Pt has no code status. Spoke with patient with wife present (Kee Duenas), and Jorge Alberto Adame RN as witnesses, patient sted he wants to be a full code.

## 2018-12-21 NOTE — PROGRESS NOTES
Spoke with DR Karlyn Cabot and advised that he will discharge the patient per Dr Isac Ibarra advising pt earlier in the day.

## 2018-12-21 NOTE — PROGRESS NOTES
Pt and wife given d/c instructions, RX info, medication teaching. Opportunity to ask questions provided. Telemetry and IV access removed.  Pt taken to main entrance for d/c home

## 2018-12-21 NOTE — PROGRESS NOTES
Problem: Falls - Risk of  Goal: *Absence of Falls  Document Khadar Fall Risk and appropriate interventions in the flowsheet.   Outcome: Progressing Towards Goal  Fall Risk Interventions:            Medication Interventions: Patient to call before getting OOB

## 2018-12-27 ENCOUNTER — TELEPHONE (OUTPATIENT)
Dept: CARDIAC REHAB | Age: 68
End: 2018-12-27

## 2018-12-27 NOTE — TELEPHONE ENCOUNTER
Pt scheduled for February 11 2019 at 1 pm- orientation packet sent with instructions when to fill out and how and to bring to appt completely filled out.

## 2018-12-28 ENCOUNTER — OFFICE VISIT (OUTPATIENT)
Dept: CARDIOLOGY CLINIC | Age: 68
End: 2018-12-28

## 2018-12-28 VITALS
RESPIRATION RATE: 12 BRPM | HEIGHT: 75 IN | DIASTOLIC BLOOD PRESSURE: 70 MMHG | SYSTOLIC BLOOD PRESSURE: 120 MMHG | WEIGHT: 271.3 LBS | HEART RATE: 60 BPM | BODY MASS INDEX: 33.73 KG/M2 | OXYGEN SATURATION: 95 %

## 2018-12-28 DIAGNOSIS — R06.02 SOB (SHORTNESS OF BREATH) ON EXERTION: Primary | ICD-10-CM

## 2018-12-28 DIAGNOSIS — Z98.890 STATUS POST CARDIAC CATHETERIZATION: ICD-10-CM

## 2018-12-28 DIAGNOSIS — I25.10 ASHD (ARTERIOSCLEROTIC HEART DISEASE): ICD-10-CM

## 2018-12-28 DIAGNOSIS — I25.110 CORONARY ARTERY DISEASE INVOLVING NATIVE CORONARY ARTERY OF NATIVE HEART WITH UNSTABLE ANGINA PECTORIS (HCC): ICD-10-CM

## 2018-12-28 DIAGNOSIS — E78.2 MIXED HYPERLIPIDEMIA: ICD-10-CM

## 2018-12-28 NOTE — PROGRESS NOTES
[address] [address] [address] [facility]  [phone]     Subjective:      Imelda Read is a 77 y.o. male is here for post cardiac cath f/u. Still reports daily nonexertional chest discomfort, improved sob. The patient denies  orthopnea, PND, LE edema, palpitations, syncope, or presyncope. Cardiac cath 12/18    SUMMARY:    --  CORONARY CIRCULATION:  --  LAD: The vessel arose anomalously from a separate origin in the L cor  sinus  --  Proximal LAD: There was a tubular 50 % stenosis. --  Mid LAD: There was a tubular 50 % stenosis. --  1ST LESION INTERVENTIONS:  --  A was performed on the lesion in the mid LAD. --  Steady baseline values were obtained. Mean arterial pressure and mean  distal coronary pressures were then obtained at maximum hyperemia. FFR was  calculated to be 0.84. Based on the results of this study, the lesion was  judged to be non-significant and no intervention was performed. --  RECOMMENDATIONS:  --  Add eliquis for stasis and possible clot formation within the coronary  aneurysm.     Patient Active Problem List    Diagnosis Date Noted    SOB (shortness of breath) on exertion 12/28/2018    Coronary artery disease involving native coronary artery of native heart with unstable angina pectoris ([address] 12/28/2018    Status post cardiac catheterization 12/28/2018    ASHD (arteriosclerotic heart disease) 12/28/2018    Mixed hyperlipidemia 12/28/2018    ACS (acute coronary syndrome) ([address] 11/29/2018      Chela Soliman MD  Past Medical History:   Diagnosis Date    CAD (coronary artery disease)     Cancer ([address]     bladder caner    Gastrointestinal disorder     GERD    Hypertension     Stroke ([address]     x's 2      Past Surgical History:   Procedure Laterality Date    ABDOMEN SURGERY PROC UNLISTED      Spleenectomy    CARDIAC SURG PROCEDURE UNLIST      Cardiac stent     HX ORTHOPAEDIC      Left toe surgery great toe    HX ORTHOPAEDIC      Left knee replacement    HX ORTHOPAEDIC      carpal tunnel left hand    HX ORTHOPAEDIC      back surgery    HX OTHER SURGICAL      bladder surgery for bladder cancer. No Known Allergies   History reviewed. No pertinent family history. Social History     Socioeconomic History    Marital status:      Spouse name: Not on file    Number of children: Not on file    Years of education: Not on file    Highest education level: Not on file   Social Needs    Financial resource strain: Not on file    Food insecurity - worry: Not on file    Food insecurity - inability: Not on file    Transportation needs - medical: Not on file   [employer] needs - non-medical: Not on file   Occupational History    Not on file   Tobacco Use    Smoking status: Former Smoker     Packs/day: 1.00     Years: 57.00     Pack years: 57.00     Types: Cigarettes     Start date: 12/17/2018    Smokeless tobacco: Never Used   Substance and Sexual Activity    Alcohol use: No    Drug use: No    Sexual activity: Not on file   Other Topics Concern    Not on file   Social History Narrative    Not on file      Current Outpatient Medications   Medication Sig    apixaban (ELIQUIS) 5 mg tablet Take 1 Tab by mouth two (2) times a day.  ranolazine ER (RANEXA) 1,000 mg Take 1 Tab by mouth two (2) times a day for 30 days.  fish oil-omega-3 fatty acids (FISH OIL) 340-1,000 mg capsule Take 1,000 mg by mouth.  nicotine (NICODERM CQ) 21 mg/24 hr 1 Patch by TransDERmal route every twenty-four (24) hours for 30 days. Disp from same vendor used at [facility]    atorvastatin (LIPITOR) 20 mg tablet Take 20 mg by mouth nightly.  metoprolol tartrate (LOPRESSOR) 25 mg tablet Take 25 mg by mouth daily.  aspirin 81 mg chewable tablet Take 162 mg by mouth daily.  multivitamin (ONE A DAY) tablet Take 1 Tab by mouth daily.  omeprazole (PRILOSEC) 10 mg capsule Take 20 mg by mouth daily.      No current facility-administered medications for this visit. Review of Symptoms:  11 systems reviewed, negative other than as stated in the HPI    Physical ExamPhysical Exam:    Vitals:    12/28/18 0914 12/28/18 0916   BP: 110/70 120/70   Pulse: 60    Resp: 12    SpO2: 95%    Weight: 271 lb 4.8 oz (123.1 kg)    Height: 6' 3\" (1.905 m)      Body mass index is 33.91 kg/m². General PE   Gen:  NAD  Mental Status - Alert. General Appearance - Not in acute distress. Chest and Lung Exam   Inspection: Accessory muscles - No use of accessory muscles in breathing. Auscultation:   Breath sounds: - Normal.   Cardiovascular   Inspection: Jugular vein - Bilateral - Inspection Normal.   Palpation/Percussion:   Apical Impulse: - Normal.   Auscultation: Rhythm - Regular. Heart Sounds - S1 WNL and S2 WNL. No S3 or S4. Murmurs & Other Heart Sounds: Auscultation of the heart reveals - No Murmurs. Peripheral Vascular   Upper Extremity: Inspection - Bilateral - No Cyanotic nailbeds or Digital clubbing. Lower Extremity:   Palpation: Edema - Bilateral - No edema. Abdomen:   Soft, non-tender, bowel sounds are active. Neuro: A&O times 3, CN and motor grossly WNL.  R radial post cardiac cath site, soft / NT    Labs:   Lab Results   Component Value Date/Time    Cholesterol, total 88 11/30/2018 04:10 AM    HDL Cholesterol 28 11/30/2018 04:10 AM    LDL, calculated 35.4 11/30/2018 04:10 AM    Triglyceride 123 11/30/2018 04:10 AM    CHOL/HDL Ratio 3.1 11/30/2018 04:10 AM     No results found for: CPK, CPKX, CPX  Lab Results   Component Value Date/Time    Sodium 140 12/20/2018 01:05 PM    Potassium 4.5 12/20/2018 01:05 PM    Chloride 108 12/20/2018 01:05 PM    CO2 29 12/20/2018 01:05 PM    Anion gap 3 (L) 12/20/2018 01:05 PM    Glucose 92 12/20/2018 01:05 PM    BUN 24 (H) 12/20/2018 01:05 PM    Creatinine 1.40 (H) 12/20/2018 01:05 PM    BUN/Creatinine ratio 17 12/20/2018 01:05 PM    GFR est AA >60 12/20/2018 01:05 PM    GFR est non-AA 50 (L) 12/20/2018 01:05 PM    Calcium 8.8 12/20/2018 01:05 PM    Bilirubin, total 0.4 12/10/2018 09:09 PM    AST (SGOT) 13 (L) 12/10/2018 09:09 PM    Alk. phosphatase 102 12/10/2018 09:09 PM    Protein, total 7.0 12/10/2018 09:09 PM    Albumin 3.7 12/10/2018 09:09 PM    Globulin 3.3 12/10/2018 09:09 PM    A-G Ratio 1.1 12/10/2018 09:09 PM    ALT (SGPT) 31 12/10/2018 09:09 PM       EKG:  SB     Assessment:     Assessment:      1. SOB (shortness of breath) on exertion    2. Coronary artery disease involving native coronary artery of native heart with unstable angina pectoris ([address]    3. Status post cardiac catheterization    4. ASHD (arteriosclerotic heart disease)    5. Mixed hyperlipidemia        Orders Placed This Encounter    AMB POC EKG ROUTINE W/ 12 LEADS, INTER & REP     Order Specific Question:   Reason for Exam:     Answer:   routine        Plan:     Patient presents for post cardiac cath f/u. Still reports daily nonexertional chest discomfort, improved sob. Suspect the nonexertional CP is in fact musculoskeletal.    CAD  Negative FFR to LAD per cardiac cath 12/18  Eliquis was added to therapy for stasis and possible clot formation within the coronary aneurysm  Continue ASA, BB, Ranexa, statin      BP controlled    HLD  11/18 LDL at 35. On statin. Lipids and labs followed by PCP. Trying to quit smoking Using nicotine patch. Congratulated.       Continue current care and f/u     Krystian Villegas MD

## 2018-12-28 NOTE — PROGRESS NOTES
1. Have you been to the ER, urgent care clinic since your last visit? Hospitalized since your last visit? Yes 12-20-18 [facility]    2. Have you seen or consulted any other health care providers outside of the [address] [place] [address] since your last visit? Include any pap smears or colon screening. No    Patient C/O sternum pain to right and upper left shoulder area with SOB on exertion and sleeping alot. He has questions about exercising. The medications are unknown and didn't bring with him today.

## 2019-07-03 ENCOUNTER — TELEPHONE (OUTPATIENT)
Dept: CARDIOLOGY CLINIC | Age: 69
End: 2019-07-03

## 2019-07-03 ENCOUNTER — OFFICE VISIT (OUTPATIENT)
Dept: CARDIOLOGY CLINIC | Age: 69
End: 2019-07-03

## 2019-07-03 VITALS
WEIGHT: 266 LBS | OXYGEN SATURATION: 90 % | HEART RATE: 65 BPM | SYSTOLIC BLOOD PRESSURE: 110 MMHG | HEIGHT: 75 IN | DIASTOLIC BLOOD PRESSURE: 80 MMHG | RESPIRATION RATE: 18 BRPM | BODY MASS INDEX: 33.07 KG/M2

## 2019-07-03 DIAGNOSIS — R06.02 SOB (SHORTNESS OF BREATH) ON EXERTION: ICD-10-CM

## 2019-07-03 DIAGNOSIS — I25.110 CORONARY ARTERY DISEASE INVOLVING NATIVE CORONARY ARTERY OF NATIVE HEART WITH UNSTABLE ANGINA PECTORIS (HCC): Primary | ICD-10-CM

## 2019-07-03 DIAGNOSIS — I24.9 ACS (ACUTE CORONARY SYNDROME) (HCC): Primary | ICD-10-CM

## 2019-07-03 DIAGNOSIS — I25.110 CORONARY ARTERY DISEASE INVOLVING NATIVE CORONARY ARTERY OF NATIVE HEART WITH UNSTABLE ANGINA PECTORIS (HCC): ICD-10-CM

## 2019-07-03 DIAGNOSIS — I25.10 ASHD (ARTERIOSCLEROTIC HEART DISEASE): ICD-10-CM

## 2019-07-03 DIAGNOSIS — E78.2 MIXED HYPERLIPIDEMIA: ICD-10-CM

## 2019-07-03 RX ORDER — RANOLAZINE 1000 MG/1
1000 TABLET, EXTENDED RELEASE ORAL 2 TIMES DAILY
Qty: 180 TAB | Refills: 3 | Status: SHIPPED | OUTPATIENT
Start: 2019-07-03 | End: 2019-10-31 | Stop reason: SDUPTHER

## 2019-07-03 RX ORDER — RANOLAZINE 1000 MG/1
TABLET, EXTENDED RELEASE ORAL
COMMUNITY
Start: 2018-12-24 | End: 2019-07-03 | Stop reason: SDUPTHER

## 2019-07-03 RX ORDER — CELECOXIB 100 MG/1
CAPSULE ORAL
Status: ON HOLD | COMMUNITY
Start: 2019-01-08 | End: 2019-11-14

## 2019-07-03 RX ORDER — CEPHALEXIN 500 MG/1
CAPSULE ORAL
COMMUNITY
Start: 2019-04-09 | End: 2019-07-03

## 2019-07-03 RX ORDER — FLUTICASONE PROPIONATE 50 MCG
2 SPRAY, SUSPENSION (ML) NASAL
COMMUNITY
Start: 2019-04-09

## 2019-07-03 NOTE — TELEPHONE ENCOUNTER
----- Message from Ricardo Kay NP sent at 7/3/2019  9:33 AM EDT -----  Regarding: Agapito Herrmann,    Please obtain most recent lipid panel for the patient from Dr. Lor Bardales office. Thanks!   Willis Crisostomo PCP awaiting lipid panel  faxed to attention [employer] NP.

## 2019-07-03 NOTE — PROGRESS NOTES
Roberto Garcia, DANGELO-BC    Subjective/HPI:     Mr. Hermelinda Figueroa is a 77 y.o. male is here for routine f/u. He has a PMHx of CAD, GERD, HTN and CVA. He is not doing well. He is always in pain, coming from rheumatoid arthritis. He had his percocet cut back which is making activity limited. He does not some improvement in shortness of breath symptoms since last visit. He denies chest pain symptoms, orthopnea, PND or edema. He denies palpitation symptoms or dizziness. He says he has only been taking Eliquis once a day, as he was having anxiety attacks with his bedtime dose. PCP Provider  Taryn Serrano MD  Past Medical History:   Diagnosis Date    CAD (coronary artery disease)     Cancer ([facility]     bladder caner    Gastrointestinal disorder     GERD    Hypertension     Stroke ([facility]     x's 2      Past Surgical History:   Procedure Laterality Date    ABDOMEN SURGERY PROC UNLISTED      Spleenectomy    CARDIAC SURG PROCEDURE UNLIST      Cardiac stent     HX ORTHOPAEDIC      Left toe surgery great toe    HX ORTHOPAEDIC      Left knee replacement    HX ORTHOPAEDIC      carpal tunnel left hand    HX ORTHOPAEDIC      back surgery    HX OTHER SURGICAL      bladder surgery for bladder cancer. History reviewed. No pertinent family history.   Social History     Socioeconomic History    Marital status:      Spouse name: Not on file    Number of children: Not on file    Years of education: Not on file    Highest education level: Not on file   Occupational History    Not on file   Social Needs    Financial resource strain: Not on file    Food insecurity:     Worry: Not on file     Inability: Not on file    Transportation needs:     Medical: Not on file     Non-medical: Not on file   Tobacco Use    Smoking status: Former Smoker     Packs/day: 1.00     Years: 57.00     Pack years: 57.00     Types: Cigarettes     Start date: 12/17/2018    Smokeless tobacco: Never Used Substance and Sexual Activity    Alcohol use: No    Drug use: No    Sexual activity: Not on file   Lifestyle    Physical activity:     Days per week: Not on file     Minutes per session: Not on file    Stress: Not on file   Relationships    Social connections:     Talks on phone: Not on file     Gets together: Not on file     Attends Jain service: Not on file     Active member of club or organization: Not on file     Attends meetings of clubs or organizations: Not on file     Relationship status: Not on file    Intimate partner violence:     Fear of current or ex partner: Not on file     Emotionally abused: Not on file     Physically abused: Not on file     Forced sexual activity: Not on file   Other Topics Concern    Not on file   Social History Narrative    Not on file       No Known Allergies     Current Outpatient Medications   Medication Sig    celecoxib (CELEBREX) 100 mg capsule     fluticasone propionate (FLONASE) 50 mcg/actuation nasal spray     ranolazine ER (RANEXA) 1,000 mg     apixaban (ELIQUIS) 5 mg tablet Take 1 Tab by mouth two (2) times a day.  atorvastatin (LIPITOR) 20 mg tablet Take 20 mg by mouth nightly.  metoprolol tartrate (LOPRESSOR) 25 mg tablet Take 25 mg by mouth daily.  aspirin 81 mg chewable tablet Take 162 mg by mouth daily.  multivitamin (ONE A DAY) tablet Take 1 Tab by mouth daily.  omeprazole (PRILOSEC) 10 mg capsule Take 20 mg by mouth daily. No current facility-administered medications for this visit. I have reviewed the problem list, allergy list, medical history, family, social history and medications. Review of Symptoms:    Review of Systems   Constitutional: Negative for chills, fever and weight loss. HENT: Negative for nosebleeds. Eyes: Negative for blurred vision and double vision. Respiratory: Negative for cough, shortness of breath and wheezing.     Cardiovascular: Negative for chest pain, palpitations, orthopnea, leg swelling and PND. Gastrointestinal: Negative for abdominal pain, blood in stool, diarrhea, nausea and vomiting. Musculoskeletal: Positive for joint pain. Skin: Negative for rash. Neurological: Negative for dizziness, tingling and loss of consciousness. Endo/Heme/Allergies: Does not bruise/bleed easily. Physical Exam:      General: Well developed, in no acute distress, cooperative and alert  HEENT: No carotid bruits, no JVD, trach is midline. Neck Supple, PEERL, EOM intact. Heart:  reg rate and rhythm; normal S1/S2; no murmurs, gallops or rubs. Respiratory: Clear bilaterally x 4, no wheezing or rales  Abdomen:   Soft, non-tender, no distention, no masses. + BS. Extremities:  Normal cap refill, no cyanosis, atraumatic. No edema. Neuro: A&Ox3, speech clear, gait stable. Skin: Skin color is normal. No rashes or lesions.  Non diaphoretic  Vascular: 2+ pulses symmetric in all extremities    Vitals:    07/03/19 0852   BP: 110/80   Pulse: 65   Resp: 18   SpO2: 90%   Weight: 266 lb (120.7 kg)   Height: 6' 3\" (1.905 m)       Cardiographics    ECG: sinus bradycardia  Results for orders placed or performed during the hospital encounter of 12/10/18   EKG, 12 LEAD, INITIAL   Result Value Ref Range    Ventricular Rate 73 BPM    Atrial Rate 73 BPM    P-R Interval 182 ms    QRS Duration 96 ms    Q-T Interval 404 ms    QTC Calculation (Bezet) 445 ms    Calculated P Axis 34 degrees    Calculated R Axis 93 degrees    Calculated T Axis 103 degrees    Diagnosis       Normal sinus rhythm  Septal infarct , age undetermined  Cannot rule out Lateral infarct , age undetermined  Confirmed by Jareth Melgar ([id number]) on 12/11/2018 3:47:28 PM         Cardiology Labs:  Lab Results   Component Value Date/Time    Cholesterol, total 88 11/30/2018 04:10 AM    HDL Cholesterol 28 11/30/2018 04:10 AM    LDL, calculated 35.4 11/30/2018 04:10 AM    Triglyceride 123 11/30/2018 04:10 AM    CHOL/HDL Ratio 3.1 11/30/2018 04:10 AM Lab Results   Component Value Date/Time    Sodium 140 12/20/2018 01:05 PM    Potassium 4.5 12/20/2018 01:05 PM    Chloride 108 12/20/2018 01:05 PM    CO2 29 12/20/2018 01:05 PM    Anion gap 3 (L) 12/20/2018 01:05 PM    Glucose 92 12/20/2018 01:05 PM    BUN 24 (H) 12/20/2018 01:05 PM    Creatinine 1.40 (H) 12/20/2018 01:05 PM    BUN/Creatinine ratio 17 12/20/2018 01:05 PM    GFR est AA >60 12/20/2018 01:05 PM    GFR est non-AA 50 (L) 12/20/2018 01:05 PM    Calcium 8.8 12/20/2018 01:05 PM    Bilirubin, total 0.4 12/10/2018 09:09 PM    AST (SGOT) 13 (L) 12/10/2018 09:09 PM    Alk. phosphatase 102 12/10/2018 09:09 PM    Protein, total 7.0 12/10/2018 09:09 PM    Albumin 3.7 12/10/2018 09:09 PM    Globulin 3.3 12/10/2018 09:09 PM    A-G Ratio 1.1 12/10/2018 09:09 PM    ALT (SGPT) 31 12/10/2018 09:09 PM           Assessment:     Assessment:       ICD-10-CM ICD-9-CM    1. Coronary artery disease involving native coronary artery of native heart with unstable angina pectoris (HCC) I25.110 414.01 AMB POC EKG ROUTINE W/ 12 LEADS, INTER & REP     411.1    2. Mixed hyperlipidemia E78.2 272.2         Plan:     1. Coronary artery disease involving native coronary artery of native heart with unstable angina pectoris ([facility]  Cath in 12/2018 with 40-50% LAD stenosis, not significant by FFR (0.84); known RCA  which is too small and not amenable to  PCI  Denies anginal or anginal equivalent symptoms  On Eliquis given risk for coronary thrombus; advised to resume taking this twice daily. Reassured that panic attacks are not a known side effect of the medication -- however, if he does develop recurrent panic attacks with resuming BID dosing, to call us and we can consider switching to daily formulation with Xarelto. Continue with Ranexa, BB, ASA and statin therapy.     2. Mixed hyperlipidemia  Continue statin therapy and low fat, low cholesterol diet  Lipids managed by PCP -- will obtain most recent labs for review    F/u in 6 months with Dr. Prabhakar Hartmann MD

## 2019-07-22 ENCOUNTER — HOSPITAL ENCOUNTER (OUTPATIENT)
Dept: LAB | Age: 69
Discharge: HOME | End: 2019-07-22
Payer: MEDICARE

## 2019-07-22 PROCEDURE — 85025 COMPLETE CBC W/AUTO DIFF WBC: CPT

## 2019-07-22 PROCEDURE — 80061 LIPID PANEL: CPT

## 2019-07-22 PROCEDURE — 80048 BASIC METABOLIC PNL TOTAL CA: CPT

## 2019-07-22 PROCEDURE — 36415 COLL VENOUS BLD VENIPUNCTURE: CPT

## 2019-07-23 LAB
BASOPHILS # BLD AUTO: 0.1 X10E3/UL (ref 0–0.2)
BASOPHILS NFR BLD AUTO: 1 %
BUN SERPL-MCNC: 21 MG/DL (ref 8–27)
BUN/CREAT SERPL: 15 (ref 10–24)
CALCIUM SERPL-MCNC: 9.6 MG/DL (ref 8.6–10.2)
CHLORIDE SERPL-SCNC: 109 MMOL/L (ref 96–106)
CHOLEST SERPL-MCNC: 128 MG/DL (ref 100–199)
CO2 SERPL-SCNC: 22 MMOL/L (ref 20–29)
CREAT SERPL-MCNC: 1.4 MG/DL (ref 0.76–1.27)
EOSINOPHIL # BLD AUTO: 0.7 X10E3/UL (ref 0–0.4)
EOSINOPHIL NFR BLD AUTO: 6 %
ERYTHROCYTE [DISTWIDTH] IN BLOOD BY AUTOMATED COUNT: 14.1 % (ref 12.3–15.4)
GLUCOSE SERPL-MCNC: 93 MG/DL (ref 65–99)
HCT VFR BLD AUTO: 47.2 % (ref 37.5–51)
HDLC SERPL-MCNC: 47 MG/DL
HGB BLD-MCNC: 15.3 G/DL (ref 13–17.7)
IMM GRANULOCYTES # BLD AUTO: 0 X10E3/UL (ref 0–0.1)
IMM GRANULOCYTES NFR BLD AUTO: 0 %
INTERPRETATION, 910389: NORMAL
INTERPRETATION: NORMAL
LDLC SERPL CALC-MCNC: 66 MG/DL (ref 0–99)
LYMPHOCYTES # BLD AUTO: 2.9 X10E3/UL (ref 0.7–3.1)
LYMPHOCYTES NFR BLD AUTO: 24 %
MCH RBC QN AUTO: 30.7 PG (ref 26.6–33)
MCHC RBC AUTO-ENTMCNC: 32.4 G/DL (ref 31.5–35.7)
MCV RBC AUTO: 95 FL (ref 79–97)
MONOCYTES # BLD AUTO: 0.9 X10E3/UL (ref 0.1–0.9)
MONOCYTES NFR BLD AUTO: 7 %
NEUTROPHILS # BLD AUTO: 7.6 X10E3/UL (ref 1.4–7)
NEUTROPHILS NFR BLD AUTO: 62 %
PDF IMAGE, 910387: NORMAL
PLATELET # BLD AUTO: 228 X10E3/UL (ref 150–450)
POTASSIUM SERPL-SCNC: 5 MMOL/L (ref 3.5–5.2)
RBC # BLD AUTO: 4.98 X10E6/UL (ref 4.14–5.8)
SODIUM SERPL-SCNC: 146 MMOL/L (ref 134–144)
TRIGL SERPL-MCNC: 76 MG/DL (ref 0–149)
VLDLC SERPL CALC-MCNC: 15 MG/DL (ref 5–40)
WBC # BLD AUTO: 12.2 X10E3/UL (ref 3.4–10.8)

## 2019-07-25 NOTE — TELEPHONE ENCOUNTER
Spoke with patient  Verified patient with 2 patient identifiers  Informed per [employer] NP lipids look good.  LDL is within goal < 70 (66).  His kidney function is stable; electrolytes are within normal limits.  No signs of anemia or infection. Keep up with same meds and keep f/u appt as scheduled. Patient verbalized understanding.

## 2019-07-25 NOTE — TELEPHONE ENCOUNTER
Montez,    Please call the patient and inform that lipids look good. LDL is within goal < 70 (66). His kidney function is stable; electrolytes are within normal limits. No signs of anemia or infection. Keep up with same meds and keep f/u appt as scheduled.     Thanks,  [employer]

## 2019-10-03 DIAGNOSIS — R06.02 SOB (SHORTNESS OF BREATH) ON EXERTION: ICD-10-CM

## 2019-10-03 DIAGNOSIS — E78.2 MIXED HYPERLIPIDEMIA: ICD-10-CM

## 2019-10-03 DIAGNOSIS — I25.10 ASHD (ARTERIOSCLEROTIC HEART DISEASE): ICD-10-CM

## 2019-10-03 DIAGNOSIS — I24.9 ACS (ACUTE CORONARY SYNDROME) (HCC): ICD-10-CM

## 2019-10-03 DIAGNOSIS — I25.110 CORONARY ARTERY DISEASE INVOLVING NATIVE CORONARY ARTERY OF NATIVE HEART WITH UNSTABLE ANGINA PECTORIS (HCC): ICD-10-CM

## 2019-10-18 ENCOUNTER — OFFICE VISIT (OUTPATIENT)
Dept: CARDIOLOGY CLINIC | Age: 69
End: 2019-10-18

## 2019-10-18 VITALS
BODY MASS INDEX: 33.77 KG/M2 | RESPIRATION RATE: 18 BRPM | SYSTOLIC BLOOD PRESSURE: 130 MMHG | WEIGHT: 271.6 LBS | HEIGHT: 75 IN | HEART RATE: 54 BPM | DIASTOLIC BLOOD PRESSURE: 76 MMHG | OXYGEN SATURATION: 93 %

## 2019-10-18 DIAGNOSIS — E78.2 MIXED HYPERLIPIDEMIA: ICD-10-CM

## 2019-10-18 DIAGNOSIS — I25.110 CORONARY ARTERY DISEASE INVOLVING NATIVE CORONARY ARTERY OF NATIVE HEART WITH UNSTABLE ANGINA PECTORIS (HCC): Primary | ICD-10-CM

## 2019-10-18 DIAGNOSIS — I25.10 ASHD (ARTERIOSCLEROTIC HEART DISEASE): ICD-10-CM

## 2019-10-18 RX ORDER — ALLOPURINOL 100 MG/1
100 TABLET ORAL 2 TIMES DAILY
COMMUNITY
Start: 2019-08-23

## 2019-10-18 NOTE — PROGRESS NOTES
Verified patient with 2 identifiers   Reviewed record in preparation for visit and obtained necessary documentation. Chief Complaint   Patient presents with    Follow-up     needs clearance to get testosterone injections - denies cardiac sx      1. Have you been to the ER, urgent care clinic since your last visit? Hospitalized since your last visit? No     2. Have you seen or consulted any other health care providers outside of the [address] [place] [address] since your last visit? Include any pap smears or colon screening.   Yes Dr Sharyle Winston                             Dr Pat Bernardo - [facility]

## 2019-10-18 NOTE — PROGRESS NOTES
Julián Perez DNP, ANP-BC  Subjective/HPI:     Jhony Morales is a 77 y.o. male is here for routine f/u. The patient denies chest pain/ shortness of breath, orthopnea, PND, LE edema, palpitations, syncope, presyncope or fatigue. Patient reports feeling well in his usual state of health, requesting clearance for testosterone injections. Has remained on Eliquis for coronary thrombus/aneurysm seen on cardiac catheterization December 2018 in the setting of ACS, history of CVA, previous intervention on LAD  in November 2018. Patient reports having baseline fatigue generalized weakness some mild dyspnea on exertion denies chest pain. He is previously been using AndroGel. Has been referred by endocrinology for clearance for testosterone therapy. 11/2018 Cardiac Cath  SUMMARY:    --  CARDIAC STRUCTURES:  --  Ejection fraction was estimated in the range of 50 % to 55 %, with no  focal wall motion abnormalities    --  CORONARY CIRCULATION:  --  Proximal LAD: There was a 100 % stenosis. There was LANE grade 0 flow  through the vessel (no flow). This lesion is a chronic total occlusion. --   Right PDA: There was a 100 % stenosis. This lesion is a chronic total  occlusion. A very small distal vessel fills by a bridge collateral. It  does not appear suitable for  PCI. --  1ST LESION INTERVENTIONS:  --  A balloon angioplasty was performed on the 100 % lesion in the  proximal LAD. --  Vessel setup was performed. A Fielder XT 190cm wire was  used in an unsuccessful attempt to cross the lesion. The wire passed very  easily into the beginning of the , then met resistance. It was not  advanced with any force. Subsequent pictures showed a small amount of  extravasation that decreased dramatically after > 30 min of balloon  inflations in the LAD proximal to the area of concern. Echo showed no  effusion and heparin was reversed with protamine.     DISPOSITION: The patient left the catheterization laboratory in stable  condition. VENTRICLES: Ejection fraction was estimated in the range of 50 % to 55 %,  with no focal wall motion abnormalities    CORONARY CIRCULATION: The coronary circulation is right dominant. Left  main: Normal. Proximal LAD: There was a 100 % stenosis. There was LANE  grade 0 flow through the vessel (no flow). This lesion is a chronic total  occlusion. Circumflex: Angiography showed minor luminal irregularities. Right PDA: There was a 100 % stenosis. This lesion is a chronic total  occlusion. A very small distal vessel fills by a bridge collateral. It  does not appear suitable for  PCI. SUMMARY:      12/2018 Cardiac Cath  --  CORONARY CIRCULATION:  --  LAD: The vessel arose anomalously from a separate origin in the L cor  sinus  --  Proximal LAD: There was a tubular 50 % stenosis. --  Mid LAD: There was a tubular 50 % stenosis. --  1ST LESION INTERVENTIONS:  --  A was performed on the lesion in the mid LAD. --  Steady baseline values were obtained. Mean arterial pressure and mean  distal coronary pressures were then obtained at maximum hyperemia. FFR was  calculated to be 0.84. Based on the results of this study, the lesion was  judged to be non-significant and no intervention was performed. --  RECOMMENDATIONS:  --  Add eliquis for stasis and possible clot formation within the coronary  aneurysm.   PCP Provider  Jaycob Medina MD  Past Medical History:   Diagnosis Date    CAD (coronary artery disease)     Cancer ([address]     bladder caner    Gastrointestinal disorder     GERD    Hypertension     Stroke [address] [address]     x's 2      Past Surgical History:   Procedure Laterality Date    ABDOMEN SURGERY PROC UNLISTED      Spleenectomy    CARDIAC SURG PROCEDURE UNLIST      Cardiac stent     HX ORTHOPAEDIC      Left toe surgery great toe    HX ORTHOPAEDIC      Left knee replacement    HX ORTHOPAEDIC      carpal tunnel left hand    HX ORTHOPAEDIC      back surgery    HX OTHER SURGICAL bladder surgery for bladder cancer. No Known Allergies   History reviewed. No pertinent family history. Current Outpatient Medications   Medication Sig    allopurinol (ZYLOPRIM) 100 mg tablet     celecoxib (CELEBREX) 100 mg capsule     fluticasone propionate (FLONASE) 50 mcg/actuation nasal spray 2 Sprays by Both Nostrils route daily as needed.  apixaban (ELIQUIS) 5 mg tablet Take 1 Tab by mouth two (2) times a day.  ranolazine ER (RANEXA) 1,000 mg Take 1 Tab by mouth two (2) times a day.  atorvastatin (LIPITOR) 20 mg tablet Take 20 mg by mouth nightly.  metoprolol tartrate (LOPRESSOR) 25 mg tablet Take 25 mg by mouth daily.  aspirin 81 mg chewable tablet Take 162 mg by mouth daily.  multivitamin (ONE A DAY) tablet Take 1 Tab by mouth daily.  omeprazole (PRILOSEC) 10 mg capsule Take 20 mg by mouth daily. No current facility-administered medications for this visit.        Vitals:    10/18/19 0912 10/18/19 0921   BP: 126/78 130/76   Pulse: (!) 54    Resp: 18    SpO2: 93%    Weight: 271 lb 9.6 oz (123.2 kg)    Height: 6' 3\" (1.905 m)      Social History     Socioeconomic History    Marital status:      Spouse name: Not on file    Number of children: Not on file    Years of education: Not on file    Highest education level: Not on file   Occupational History    Not on file   Social Needs    Financial resource strain: Not on file    Food insecurity:     Worry: Not on file     Inability: Not on file    Transportation needs:     Medical: Not on file     Non-medical: Not on file   Tobacco Use    Smoking status: Former Smoker     Packs/day: 1.00     Years: 57.00     Pack years: 57.00     Types: Cigarettes     Start date: 12/17/2018    Smokeless tobacco: Never Used   Substance and Sexual Activity    Alcohol use: No    Drug use: No    Sexual activity: Not on file   Lifestyle    Physical activity:     Days per week: Not on file     Minutes per session: Not on file    Stress: Not on file   Relationships    Social connections:     Talks on phone: Not on file     Gets together: Not on file     Attends [employer] service: Not on file     Active member of club or organization: Not on file     Attends meetings of clubs or organizations: Not on file     Relationship status: Not on file    Intimate partner violence:     Fear of current or ex partner: Not on file     Emotionally abused: Not on file     Physically abused: Not on file     Forced sexual activity: Not on file   Other Topics Concern    Not on file   Social History Narrative    Not on file       I have reviewed the nurses notes, vitals, problem list, allergy list, medical history, family, social history and medications. Review of Symptoms:    General: Pt denies excessive weight gain or loss. Pt is able to conduct ADL's however experiencing generalized fatigue and weakness  HEENT: Denies blurred vision, headaches, epistaxis and difficulty swallowing. Respiratory: Denies shortness of breath, + intermittent KIM, wheezing or stridor. Cardiovascular: Denies precordial pain, palpitations, edema or PND  Gastrointestinal: Denies poor appetite, indigestion, abdominal pain or blood in stool  Musculoskeletal: Denies pain or swelling from muscles or joints  Neurologic: Denies tremor, paresthesias, or sensory motor disturbance  Skin: Denies rash, itching or texture change. Physical Exam:      General: Well developed, in no acute distress, cooperative and alert  HEENT: No carotid bruits, no JVD, trach is midline. Neck Supple, PERRL, EOM intact. Heart:  Normal S1/S2 negative S3 or S4. Regular, no murmur, gallop or rub. Respiratory: Clear bilaterally x 4, no wheezing or rales  Abdomen:   Soft, non-tender, no masses, bowel sounds are active. Extremities:  No edema, normal cap refill, no cyanosis, atraumatic. Neuro: A&Ox3, speech clear, gait stable. Skin: Skin color is normal. No rashes or lesions.  Non diaphoretic  Vascular: 2+ pulses symmetric in all extremities    Cardiographics    ECG: NSR   Results for orders placed or performed during the hospital encounter of 12/10/18   EKG, 12 LEAD, INITIAL   Result Value Ref Range    Ventricular Rate 73 BPM    Atrial Rate 73 BPM    P-R Interval 182 ms    QRS Duration 96 ms    Q-T Interval 404 ms    QTC Calculation (Bezet) 445 ms    Calculated P Axis 34 degrees    Calculated R Axis 93 degrees    Calculated T Axis 103 degrees    Diagnosis       Normal sinus rhythm  Septal infarct , age undetermined  Cannot rule out Lateral infarct , age undetermined  Confirmed by Juany Paredes ([id number]) on 12/11/2018 3:47:28 PM           Cardiology Labs:  Lab Results   Component Value Date/Time    Cholesterol, total 128 07/22/2019 12:13 PM    HDL Cholesterol 47 07/22/2019 12:13 PM    LDL, calculated 66 07/22/2019 12:13 PM    Triglyceride 76 07/22/2019 12:13 PM    CHOL/HDL Ratio 3.1 11/30/2018 04:10 AM       Lab Results   Component Value Date/Time    Sodium 146 (H) 07/22/2019 12:13 PM    Potassium 5.0 07/22/2019 12:13 PM    Chloride 109 (H) 07/22/2019 12:13 PM    CO2 22 07/22/2019 12:13 PM    Anion gap 3 (L) 12/20/2018 01:05 PM    Glucose 93 07/22/2019 12:13 PM    BUN 21 07/22/2019 12:13 PM    Creatinine 1.40 (H) 07/22/2019 12:13 PM    BUN/Creatinine ratio 15 07/22/2019 12:13 PM    GFR est AA 58 (L) 07/22/2019 12:13 PM    GFR est non-AA 51 (L) 07/22/2019 12:13 PM    Calcium 9.6 07/22/2019 12:13 PM    Bilirubin, total 0.4 12/10/2018 09:09 PM    AST (SGOT) 13 (L) 12/10/2018 09:09 PM    Alk. phosphatase 102 12/10/2018 09:09 PM    Protein, total 7.0 12/10/2018 09:09 PM    Albumin 3.7 12/10/2018 09:09 PM    Globulin 3.3 12/10/2018 09:09 PM    A-G Ratio 1.1 12/10/2018 09:09 PM    ALT (SGPT) 31 12/10/2018 09:09 PM           Assessment:     Assessment:     Diagnoses and all orders for this visit:    1.  Coronary artery disease involving native coronary artery of native heart with unstable angina pectoris ([address]  -     AMB POC EKG ROUTINE W/ 12 LEADS, INTER & REP  -     ECHO STRESS; Future    2. ASHD (arteriosclerotic heart disease)  -     ECHO STRESS; Future    3. Mixed hyperlipidemia  -     ECHO STRESS; Future        ICD-10-CM ICD-9-CM    1. Coronary artery disease involving native coronary artery of native heart with unstable angina pectoris (HCC) I25.110 414.01 AMB POC EKG ROUTINE W/ 12 LEADS, INTER & REP     411.1 ECHO STRESS      CANCELED: AMB POC EKG ROUTINE W/ 12 LEADS, INTER & REP   2. ASHD (arteriosclerotic heart disease) I25.10 414.00 ECHO STRESS   3. Mixed hyperlipidemia E78.2 272.2 ECHO STRESS     Orders Placed This Encounter    AMB POC EKG ROUTINE W/ 12 LEADS, INTER & REP     Order Specific Question:   Reason for Exam:     Answer:   routine    allopurinol (ZYLOPRIM) 100 mg tablet        Plan:     1. Atherosclerotic heart disease: LAD lesion with aneurysm, no significant dyspnea on exertion or chest pain continue Ranexa and metoprolol. On Eliquis for intracoronary artery thrombus/aneurysm in setting of ACS December 2018. 2.  Hypertension: Controlled 130/76  3. Hyperlipidemia: LDL 66 continue statin therapy  Will evaluate cardiac status/presence of ischemia with stress echocardiogram.  If normal stress echo he would then be cleared for testosterone replacement therapy. Nilesh Andrews MD      Please note that this dictation was completed with [employer], the computer voice recognition software. Quite often unanticipated grammatical, syntax, homophones, and other interpretive errors are inadvertently transcribed by the computer software. Please disregard these errors. Please excuse any errors that have escaped final proofreading. Thank you.

## 2019-10-31 ENCOUNTER — TELEPHONE (OUTPATIENT)
Dept: CARDIOLOGY CLINIC | Age: 69
End: 2019-10-31

## 2019-10-31 NOTE — TELEPHONE ENCOUNTER
Please call patient regarding elquis,and ranolzine er, he would like 90 day sent to [facility] 90 day with 3 refills.  Thanks

## 2019-10-31 NOTE — TELEPHONE ENCOUNTER
Spoke to patient using 2 identifiers. Patient requested a letter sent/faxed to Dr. Chidi Ortiz clearing him for testosterone shots. Thank you!

## 2019-10-31 NOTE — TELEPHONE ENCOUNTER
Spoke to patient using 2 identifiers. He requested refills sent to mail order pharmacy instead. Will order and pend scripts to provider.

## 2019-11-01 ENCOUNTER — DOCUMENTATION ONLY (OUTPATIENT)
Dept: CARDIOLOGY CLINIC | Age: 69
End: 2019-11-01

## 2019-11-01 RX ORDER — RANOLAZINE 1000 MG/1
1000 TABLET, EXTENDED RELEASE ORAL 2 TIMES DAILY
Qty: 180 TAB | Refills: 3 | Status: SHIPPED | OUTPATIENT
Start: 2019-11-01 | End: 2020-07-21 | Stop reason: SDDI

## 2019-11-01 NOTE — PROGRESS NOTES
Letter clearing patient for testosterone replacement therapy faxed to Dr. Froilan Zhao at [facility] at [phone] with confirmation received.

## 2019-11-13 ENCOUNTER — APPOINTMENT (OUTPATIENT)
Dept: GENERAL RADIOLOGY | Age: 69
DRG: 247 | End: 2019-11-13
Attending: EMERGENCY MEDICINE
Payer: MEDICARE

## 2019-11-13 ENCOUNTER — HOSPITAL ENCOUNTER (INPATIENT)
Age: 69
LOS: 2 days | Discharge: HOME HEALTH | DRG: 247 | End: 2019-11-15
Attending: EMERGENCY MEDICINE | Admitting: INTERNAL MEDICINE
Payer: MEDICARE

## 2019-11-13 DIAGNOSIS — I21.4 NSTEMI (NON-ST ELEVATED MYOCARDIAL INFARCTION) (HCC): Primary | ICD-10-CM

## 2019-11-13 LAB
ALBUMIN SERPL-MCNC: 3.8 G/DL (ref 3.5–5)
ALBUMIN/GLOB SERPL: 1.2 {RATIO} (ref 1.1–2.2)
ALP SERPL-CCNC: 90 U/L (ref 45–117)
ALT SERPL-CCNC: 28 U/L (ref 12–78)
ANION GAP SERPL CALC-SCNC: 8 MMOL/L (ref 5–15)
AST SERPL-CCNC: 16 U/L (ref 15–37)
BASOPHILS # BLD: 0.1 K/UL (ref 0–0.1)
BASOPHILS NFR BLD: 1 % (ref 0–1)
BILIRUB SERPL-MCNC: 0.5 MG/DL (ref 0.2–1)
BUN SERPL-MCNC: 22 MG/DL (ref 6–20)
BUN/CREAT SERPL: 14 (ref 12–20)
CALCIUM SERPL-MCNC: 9 MG/DL (ref 8.5–10.1)
CHLORIDE SERPL-SCNC: 111 MMOL/L (ref 97–108)
CK SERPL-CCNC: 217 U/L (ref 39–308)
CO2 SERPL-SCNC: 24 MMOL/L (ref 21–32)
CREAT SERPL-MCNC: 1.56 MG/DL (ref 0.7–1.3)
DIFFERENTIAL METHOD BLD: NORMAL
EOSINOPHIL # BLD: 0.2 K/UL (ref 0–0.4)
EOSINOPHIL NFR BLD: 2 % (ref 0–7)
ERYTHROCYTE [DISTWIDTH] IN BLOOD BY AUTOMATED COUNT: 12.8 % (ref 11.5–14.5)
GLOBULIN SER CALC-MCNC: 3.2 G/DL (ref 2–4)
GLUCOSE SERPL-MCNC: 132 MG/DL (ref 65–100)
HCT VFR BLD AUTO: 41.8 % (ref 36.6–50.3)
HGB BLD-MCNC: 14 G/DL (ref 12.1–17)
IMM GRANULOCYTES # BLD AUTO: 0 K/UL (ref 0–0.04)
IMM GRANULOCYTES NFR BLD AUTO: 0 % (ref 0–0.5)
LYMPHOCYTES # BLD: 2.1 K/UL (ref 0.8–3.5)
LYMPHOCYTES NFR BLD: 19 % (ref 12–49)
MCH RBC QN AUTO: 32.9 PG (ref 26–34)
MCHC RBC AUTO-ENTMCNC: 33.5 G/DL (ref 30–36.5)
MCV RBC AUTO: 98.4 FL (ref 80–99)
MONOCYTES # BLD: 1 K/UL (ref 0–1)
MONOCYTES NFR BLD: 9 % (ref 5–13)
NEUTS SEG # BLD: 7.6 K/UL (ref 1.8–8)
NEUTS SEG NFR BLD: 69 % (ref 32–75)
NRBC # BLD: 0 K/UL (ref 0–0.01)
NRBC BLD-RTO: 0 PER 100 WBC
PLATELET # BLD AUTO: 234 K/UL (ref 150–400)
PMV BLD AUTO: 12.1 FL (ref 8.9–12.9)
POTASSIUM SERPL-SCNC: 3.9 MMOL/L (ref 3.5–5.1)
PROT SERPL-MCNC: 7 G/DL (ref 6.4–8.2)
RBC # BLD AUTO: 4.25 M/UL (ref 4.1–5.7)
SODIUM SERPL-SCNC: 143 MMOL/L (ref 136–145)
TROPONIN I BLD-MCNC: 0.66 NG/ML (ref 0–0.08)
TROPONIN I SERPL-MCNC: 1.24 NG/ML
WBC # BLD AUTO: 11 K/UL (ref 4.1–11.1)

## 2019-11-13 PROCEDURE — 82550 ASSAY OF CK (CPK): CPT

## 2019-11-13 PROCEDURE — 84484 ASSAY OF TROPONIN QUANT: CPT

## 2019-11-13 PROCEDURE — 93005 ELECTROCARDIOGRAM TRACING: CPT

## 2019-11-13 PROCEDURE — 65660000000 HC RM CCU STEPDOWN

## 2019-11-13 PROCEDURE — 99285 EMERGENCY DEPT VISIT HI MDM: CPT

## 2019-11-13 PROCEDURE — 36415 COLL VENOUS BLD VENIPUNCTURE: CPT

## 2019-11-13 PROCEDURE — 85025 COMPLETE CBC W/AUTO DIFF WBC: CPT

## 2019-11-13 PROCEDURE — 74011250637 HC RX REV CODE- 250/637: Performed by: EMERGENCY MEDICINE

## 2019-11-13 PROCEDURE — 74011250637 HC RX REV CODE- 250/637: Performed by: INTERNAL MEDICINE

## 2019-11-13 PROCEDURE — 80053 COMPREHEN METABOLIC PANEL: CPT

## 2019-11-13 PROCEDURE — 71045 X-RAY EXAM CHEST 1 VIEW: CPT

## 2019-11-13 RX ORDER — HEPARIN SODIUM 10000 [USP'U]/100ML
8-25 INJECTION, SOLUTION INTRAVENOUS
Status: DISCONTINUED | OUTPATIENT
Start: 2019-11-13 | End: 2019-11-13

## 2019-11-13 RX ORDER — SODIUM CHLORIDE 0.9 % (FLUSH) 0.9 %
5-40 SYRINGE (ML) INJECTION EVERY 8 HOURS
Status: DISCONTINUED | OUTPATIENT
Start: 2019-11-13 | End: 2019-11-15 | Stop reason: HOSPADM

## 2019-11-13 RX ORDER — NITROGLYCERIN 0.4 MG/1
0.4 TABLET SUBLINGUAL AS NEEDED
Status: DISCONTINUED | OUTPATIENT
Start: 2019-11-13 | End: 2019-11-15 | Stop reason: HOSPADM

## 2019-11-13 RX ORDER — METOPROLOL TARTRATE 25 MG/1
25 TABLET, FILM COATED ORAL DAILY
Status: DISCONTINUED | OUTPATIENT
Start: 2019-11-14 | End: 2019-11-14

## 2019-11-13 RX ORDER — SODIUM CHLORIDE 9 MG/ML
100 INJECTION, SOLUTION INTRAVENOUS CONTINUOUS
Status: DISPENSED | OUTPATIENT
Start: 2019-11-13 | End: 2019-11-14

## 2019-11-13 RX ORDER — HEPARIN SODIUM 5000 [USP'U]/ML
2000 INJECTION, SOLUTION INTRAVENOUS; SUBCUTANEOUS AS NEEDED
Status: DISCONTINUED | OUTPATIENT
Start: 2019-11-14 | End: 2019-11-14 | Stop reason: ALTCHOICE

## 2019-11-13 RX ORDER — HEPARIN SODIUM 5000 [USP'U]/ML
4000 INJECTION, SOLUTION INTRAVENOUS; SUBCUTANEOUS ONCE
Status: DISCONTINUED | OUTPATIENT
Start: 2019-11-13 | End: 2019-11-13

## 2019-11-13 RX ORDER — MORPHINE SULFATE 2 MG/ML
1 INJECTION, SOLUTION INTRAMUSCULAR; INTRAVENOUS
Status: DISCONTINUED | OUTPATIENT
Start: 2019-11-13 | End: 2019-11-15 | Stop reason: HOSPADM

## 2019-11-13 RX ORDER — ATORVASTATIN CALCIUM 20 MG/1
20 TABLET, FILM COATED ORAL
Status: DISCONTINUED | OUTPATIENT
Start: 2019-11-13 | End: 2019-11-15 | Stop reason: HOSPADM

## 2019-11-13 RX ORDER — ASPIRIN 325 MG
325 TABLET ORAL
Status: COMPLETED | OUTPATIENT
Start: 2019-11-13 | End: 2019-11-13

## 2019-11-13 RX ORDER — ALLOPURINOL 100 MG/1
100 TABLET ORAL DAILY
Status: DISCONTINUED | OUTPATIENT
Start: 2019-11-14 | End: 2019-11-14

## 2019-11-13 RX ORDER — HEPARIN SODIUM 10000 [USP'U]/100ML
8-25 INJECTION, SOLUTION INTRAVENOUS
Status: DISCONTINUED | OUTPATIENT
Start: 2019-11-14 | End: 2019-11-14

## 2019-11-13 RX ORDER — SODIUM CHLORIDE 0.9 % (FLUSH) 0.9 %
5-40 SYRINGE (ML) INJECTION AS NEEDED
Status: DISCONTINUED | OUTPATIENT
Start: 2019-11-13 | End: 2019-11-15 | Stop reason: HOSPADM

## 2019-11-13 RX ORDER — HEPARIN SODIUM 5000 [USP'U]/ML
4000 INJECTION, SOLUTION INTRAVENOUS; SUBCUTANEOUS AS NEEDED
Status: DISCONTINUED | OUTPATIENT
Start: 2019-11-14 | End: 2019-11-13

## 2019-11-13 RX ORDER — MORPHINE SULFATE 4 MG/ML
4 INJECTION INTRAVENOUS
Status: DISCONTINUED | OUTPATIENT
Start: 2019-11-13 | End: 2019-11-14 | Stop reason: SDUPTHER

## 2019-11-13 RX ORDER — HEPARIN SODIUM 5000 [USP'U]/ML
4000 INJECTION, SOLUTION INTRAVENOUS; SUBCUTANEOUS AS NEEDED
Status: DISCONTINUED | OUTPATIENT
Start: 2019-11-14 | End: 2019-11-14 | Stop reason: ALTCHOICE

## 2019-11-13 RX ORDER — HEPARIN SODIUM 5000 [USP'U]/ML
4000 INJECTION, SOLUTION INTRAVENOUS; SUBCUTANEOUS ONCE
Status: COMPLETED | OUTPATIENT
Start: 2019-11-14 | End: 2019-11-14

## 2019-11-13 RX ORDER — HEPARIN SODIUM 5000 [USP'U]/ML
2000 INJECTION, SOLUTION INTRAVENOUS; SUBCUTANEOUS AS NEEDED
Status: DISCONTINUED | OUTPATIENT
Start: 2019-11-14 | End: 2019-11-13

## 2019-11-13 RX ORDER — RANOLAZINE 500 MG/1
1000 TABLET, EXTENDED RELEASE ORAL EVERY 12 HOURS
Status: DISCONTINUED | OUTPATIENT
Start: 2019-11-13 | End: 2019-11-15 | Stop reason: HOSPADM

## 2019-11-13 RX ADMIN — ATORVASTATIN CALCIUM 20 MG: 20 TABLET, FILM COATED ORAL at 01:00

## 2019-11-13 RX ADMIN — NITROGLYCERIN 0.5 INCH: 20 OINTMENT TOPICAL at 21:48

## 2019-11-13 RX ADMIN — ASPIRIN 325 MG: 325 TABLET, FILM COATED ORAL at 21:47

## 2019-11-13 NOTE — Clinical Note
TRANSFER - OUT REPORT:  
 
Verbal report given to: Thor Valdez. Report consisted of patient's Situation, Background, Assessment and  
Recommendations(SBAR). Opportunity for questions and clarification was provided. Patient transported with a Registered Nurse and [address] [place] [address] / [facility]. Patient transported to: CCU.

## 2019-11-13 NOTE — Clinical Note
Lesion: Located in the Proximal OM 1. Stent inserted. Stent deployed. Single technique used. First inflation pressure = 15 haley; inflation time: 18 sec.

## 2019-11-13 NOTE — Clinical Note
Lesion: Located in the R PDA. Stent inserted. Stent deployed. Single technique used. First inflation pressure = 15 haley; inflation time: 15 sec.

## 2019-11-13 NOTE — Clinical Note
Lesion located in the Proximal OM 1. Balloon inserted. Balloon inflated using single inflation technique. Pressure = 8 haley; Duration = 8 sec.

## 2019-11-13 NOTE — Clinical Note
Lesion located in the R PDA. Balloon inserted. Balloon inflated using multiple inflations inflation technique. Pressure = 8 haley; Duration = 14 sec. Inflation 2: Pressure: 8 haley; Duration: 9 sec.

## 2019-11-14 ENCOUNTER — APPOINTMENT (OUTPATIENT)
Dept: NON INVASIVE DIAGNOSTICS | Age: 69
DRG: 247 | End: 2019-11-14
Attending: INTERNAL MEDICINE
Payer: MEDICARE

## 2019-11-14 PROBLEM — Z72.0 TOBACCO ABUSE: Status: ACTIVE | Noted: 2019-11-14

## 2019-11-14 LAB
ANION GAP SERPL CALC-SCNC: 6 MMOL/L (ref 5–15)
APTT PPP: 29.5 SEC (ref 22.1–32)
APTT PPP: 29.6 SEC (ref 22.1–32)
ATRIAL RATE: 55 BPM
ATRIAL RATE: 65 BPM
AV VELOCITY RATIO: 0.84
BUN SERPL-MCNC: 20 MG/DL (ref 6–20)
BUN/CREAT SERPL: 15 (ref 12–20)
CALCIUM SERPL-MCNC: 8.5 MG/DL (ref 8.5–10.1)
CALCULATED P AXIS, ECG09: -5 DEGREES
CALCULATED P AXIS, ECG09: 67 DEGREES
CALCULATED R AXIS, ECG10: 42 DEGREES
CALCULATED R AXIS, ECG10: 56 DEGREES
CALCULATED T AXIS, ECG11: 19 DEGREES
CALCULATED T AXIS, ECG11: 56 DEGREES
CHLORIDE SERPL-SCNC: 112 MMOL/L (ref 97–108)
CHLORIDE UR-SCNC: 197 MMOL/L
CO2 SERPL-SCNC: 26 MMOL/L (ref 21–32)
CREAT SERPL-MCNC: 1.33 MG/DL (ref 0.7–1.3)
CREAT UR-MCNC: 116 MG/DL
DIAGNOSIS, 93000: NORMAL
DIAGNOSIS, 93000: NORMAL
ECHO AO ROOT DIAM: 3.98 CM
ECHO AV AREA PEAK VELOCITY: 3.3 CM2
ECHO AV AREA/BSA PEAK VELOCITY: 1.3 CM2/M2
ECHO AV PEAK GRADIENT: 4.7 MMHG
ECHO AV PEAK VELOCITY: 108.79 CM/S
ECHO EST RA PRESSURE: 10 MMHG
ECHO LA AREA 4C: 15.6 CM2
ECHO LA MAJOR AXIS: 4.37 CM
ECHO LA TO AORTIC ROOT RATIO: 1.1
ECHO LA VOL 4C: 31.73 ML (ref 18–58)
ECHO LA VOLUME INDEX A4C: 12.83 ML/M2 (ref 16–28)
ECHO LV E' LATERAL VELOCITY: 9.46 CM/S
ECHO LV E' SEPTAL VELOCITY: 9.19 CM/S
ECHO LV EDV TEICHHOLZ: 0.57 ML
ECHO LV ESV TEICHHOLZ: 0.4 ML
ECHO LV INTERNAL DIMENSION DIASTOLIC: 4.83 CM (ref 4.2–5.9)
ECHO LV INTERNAL DIMENSION SYSTOLIC: 4.13 CM
ECHO LV IVSD: 1.3 CM (ref 0.6–1)
ECHO LV MASS 2D: 442.5 G (ref 88–224)
ECHO LV MASS INDEX 2D: 178.9 G/M2 (ref 49–115)
ECHO LV POSTERIOR WALL DIASTOLIC: 1.27 CM (ref 0.6–1)
ECHO LV POSTERIOR WALL SYSTOLIC: 1.48 CM
ECHO LVOT DIAM: 2.24 CM
ECHO LVOT PEAK GRADIENT: 3.3 MMHG
ECHO LVOT PEAK VELOCITY: 91.46 CM/S
ECHO LVOT SV: 85.8 ML
ECHO LVOT VTI: 21.75 CM
ECHO MV A VELOCITY: 73.55 CM/S
ECHO MV AREA VTI: 3 CM2
ECHO MV E DECELERATION TIME (DT): 294.4 MS
ECHO MV E VELOCITY: 44.56 CM/S
ECHO MV E/A RATIO: 0.61
ECHO MV E/E' LATERAL: 4.71
ECHO MV E/E' RATIO (AVERAGED): 4.78
ECHO MV E/E' SEPTAL: 4.85
ECHO MV MAX VELOCITY: 74.21 CM/S
ECHO MV MEAN GRADIENT: 0.7 MMHG
ECHO MV MEAN INFLOW VELOCITY: 0.38 M/S
ECHO MV PEAK GRADIENT: 2.2 MMHG
ECHO MV REGURGITANT RADIUS PISA: 0.72 CM
ECHO MV VTI: 28.4 CM
ECHO PULMONARY ARTERY SYSTOLIC PRESSURE (PASP): 29.9 MMHG
ECHO RA AREA 4C: 16.24 CM2
ECHO RIGHT VENTRICULAR SYSTOLIC PRESSURE (RVSP): 29.9 MMHG
ECHO TV REGURGITANT MAX VELOCITY: 222.98 CM/S
ECHO TV REGURGITANT PEAK GRADIENT: 19.9 MMHG
ERYTHROCYTE [DISTWIDTH] IN BLOOD BY AUTOMATED COUNT: 13 % (ref 11.5–14.5)
EST. AVERAGE GLUCOSE BLD GHB EST-MCNC: 117 MG/DL
GLUCOSE SERPL-MCNC: 97 MG/DL (ref 65–100)
HBA1C MFR BLD: 5.7 % (ref 4–5.6)
HCT VFR BLD AUTO: 40.9 % (ref 36.6–50.3)
HGB BLD-MCNC: 13.2 G/DL (ref 12.1–17)
LVFS 2D: 14.39 %
LVOT MG: 1.57 MMHG
LVOT MV: 0.59 CM/S
LVSV (TEICH): 13.22 ML
MCH RBC QN AUTO: 32 PG (ref 26–34)
MCHC RBC AUTO-ENTMCNC: 32.3 G/DL (ref 30–36.5)
MCV RBC AUTO: 99 FL (ref 80–99)
MV DEC SLOPE: 1.51
NRBC # BLD: 0 K/UL (ref 0–0.01)
NRBC BLD-RTO: 0 PER 100 WBC
P-R INTERVAL, ECG05: 198 MS
P-R INTERVAL, ECG05: 200 MS
PLATELET # BLD AUTO: 223 K/UL (ref 150–400)
PMV BLD AUTO: 12 FL (ref 8.9–12.9)
POTASSIUM SERPL-SCNC: 4.2 MMOL/L (ref 3.5–5.1)
Q-T INTERVAL, ECG07: 434 MS
Q-T INTERVAL, ECG07: 482 MS
QRS DURATION, ECG06: 106 MS
QRS DURATION, ECG06: 96 MS
QTC CALCULATION (BEZET), ECG08: 451 MS
QTC CALCULATION (BEZET), ECG08: 461 MS
RBC # BLD AUTO: 4.13 M/UL (ref 4.1–5.7)
SODIUM SERPL-SCNC: 144 MMOL/L (ref 136–145)
SODIUM UR-SCNC: 159 MMOL/L
THERAPEUTIC RANGE,PTTT: NORMAL SECS (ref 58–77)
THERAPEUTIC RANGE,PTTT: NORMAL SECS (ref 58–77)
TROPONIN I SERPL-MCNC: 4.44 NG/ML
TROPONIN I SERPL-MCNC: 7.35 NG/ML
VENTRICULAR RATE, ECG03: 55 BPM
VENTRICULAR RATE, ECG03: 65 BPM
WBC # BLD AUTO: 8.5 K/UL (ref 4.1–11.1)

## 2019-11-14 PROCEDURE — 93458 L HRT ARTERY/VENTRICLE ANGIO: CPT | Performed by: INTERNAL MEDICINE

## 2019-11-14 PROCEDURE — C1894 INTRO/SHEATH, NON-LASER: HCPCS | Performed by: INTERNAL MEDICINE

## 2019-11-14 PROCEDURE — C1769 GUIDE WIRE: HCPCS | Performed by: INTERNAL MEDICINE

## 2019-11-14 PROCEDURE — 77030004549 HC CATH ANGI DX PRF MRTM -A: Performed by: INTERNAL MEDICINE

## 2019-11-14 PROCEDURE — C1725 CATH, TRANSLUMIN NON-LASER: HCPCS | Performed by: INTERNAL MEDICINE

## 2019-11-14 PROCEDURE — B2111ZZ FLUOROSCOPY OF MULTIPLE CORONARY ARTERIES USING LOW OSMOLAR CONTRAST: ICD-10-PCS | Performed by: INTERNAL MEDICINE

## 2019-11-14 PROCEDURE — 93306 TTE W/DOPPLER COMPLETE: CPT

## 2019-11-14 PROCEDURE — 85027 COMPLETE CBC AUTOMATED: CPT

## 2019-11-14 PROCEDURE — 77030019698 HC SYR ANGI MDLON MRTM -A: Performed by: INTERNAL MEDICINE

## 2019-11-14 PROCEDURE — 36415 COLL VENOUS BLD VENIPUNCTURE: CPT

## 2019-11-14 PROCEDURE — 74011250637 HC RX REV CODE- 250/637: Performed by: INTERNAL MEDICINE

## 2019-11-14 PROCEDURE — 77030019569 HC BND COMPR RAD TERU -B: Performed by: INTERNAL MEDICINE

## 2019-11-14 PROCEDURE — 74011250636 HC RX REV CODE- 250/636: Performed by: EMERGENCY MEDICINE

## 2019-11-14 PROCEDURE — B2151ZZ FLUOROSCOPY OF LEFT HEART USING LOW OSMOLAR CONTRAST: ICD-10-PCS | Performed by: INTERNAL MEDICINE

## 2019-11-14 PROCEDURE — C1874 STENT, COATED/COV W/DEL SYS: HCPCS | Performed by: INTERNAL MEDICINE

## 2019-11-14 PROCEDURE — 77030010221 HC SPLNT WR POS TELE -B: Performed by: INTERNAL MEDICINE

## 2019-11-14 PROCEDURE — 74011636320 HC RX REV CODE- 636/320: Performed by: INTERNAL MEDICINE

## 2019-11-14 PROCEDURE — 92928 PRQ TCAT PLMT NTRAC ST 1 LES: CPT | Performed by: INTERNAL MEDICINE

## 2019-11-14 PROCEDURE — C1887 CATHETER, GUIDING: HCPCS | Performed by: INTERNAL MEDICINE

## 2019-11-14 PROCEDURE — 99152 MOD SED SAME PHYS/QHP 5/>YRS: CPT | Performed by: INTERNAL MEDICINE

## 2019-11-14 PROCEDURE — 82436 ASSAY OF URINE CHLORIDE: CPT

## 2019-11-14 PROCEDURE — 82570 ASSAY OF URINE CREATININE: CPT

## 2019-11-14 PROCEDURE — 93005 ELECTROCARDIOGRAM TRACING: CPT

## 2019-11-14 PROCEDURE — 77030012468 HC VLV BLEEDBK CNTRL ABBT -B: Performed by: INTERNAL MEDICINE

## 2019-11-14 PROCEDURE — 4A023N7 MEASUREMENT OF CARDIAC SAMPLING AND PRESSURE, LEFT HEART, PERCUTANEOUS APPROACH: ICD-10-PCS | Performed by: INTERNAL MEDICINE

## 2019-11-14 PROCEDURE — C1753 CATH, INTRAVAS ULTRASOUND: HCPCS | Performed by: INTERNAL MEDICINE

## 2019-11-14 PROCEDURE — 85347 COAGULATION TIME ACTIVATED: CPT

## 2019-11-14 PROCEDURE — 77030028837 HC SYR ANGI PWR INJ COEU -A: Performed by: INTERNAL MEDICINE

## 2019-11-14 PROCEDURE — 76937 US GUIDE VASCULAR ACCESS: CPT | Performed by: INTERNAL MEDICINE

## 2019-11-14 PROCEDURE — 77030008543 HC TBNG MON PRSS MRTM -A: Performed by: INTERNAL MEDICINE

## 2019-11-14 PROCEDURE — 74011250636 HC RX REV CODE- 250/636: Performed by: INTERNAL MEDICINE

## 2019-11-14 PROCEDURE — 65660000000 HC RM CCU STEPDOWN

## 2019-11-14 PROCEDURE — 74011000250 HC RX REV CODE- 250: Performed by: INTERNAL MEDICINE

## 2019-11-14 PROCEDURE — 84484 ASSAY OF TROPONIN QUANT: CPT

## 2019-11-14 PROCEDURE — 84300 ASSAY OF URINE SODIUM: CPT

## 2019-11-14 PROCEDURE — 83036 HEMOGLOBIN GLYCOSYLATED A1C: CPT

## 2019-11-14 PROCEDURE — 99153 MOD SED SAME PHYS/QHP EA: CPT | Performed by: INTERNAL MEDICINE

## 2019-11-14 PROCEDURE — 80048 BASIC METABOLIC PNL TOTAL CA: CPT

## 2019-11-14 PROCEDURE — 85730 THROMBOPLASTIN TIME PARTIAL: CPT

## 2019-11-14 PROCEDURE — 77030019697 HC SYR ANGI INFL MRTM -B: Performed by: INTERNAL MEDICINE

## 2019-11-14 PROCEDURE — 77030015766: Performed by: INTERNAL MEDICINE

## 2019-11-14 PROCEDURE — 027135Z DILATION OF CORONARY ARTERY, TWO ARTERIES WITH TWO DRUG-ELUTING INTRALUMINAL DEVICES, PERCUTANEOUS APPROACH: ICD-10-PCS | Performed by: INTERNAL MEDICINE

## 2019-11-14 DEVICE — STENT RONYX20026UX RESOLUTE ONYX 2.00X26
Type: IMPLANTABLE DEVICE | Status: FUNCTIONAL
Brand: RESOLUTE ONYX™

## 2019-11-14 DEVICE — XIENCE SIERRA™ EVEROLIMUS ELUTING CORONARY STENT SYSTEM 3.25 MM X 18 MM / RAPID-EXCHANGE
Type: IMPLANTABLE DEVICE | Status: FUNCTIONAL
Brand: XIENCE SIERRA™

## 2019-11-14 RX ORDER — IBUPROFEN 200 MG
1 TABLET ORAL DAILY
Status: DISCONTINUED | OUTPATIENT
Start: 2019-11-15 | End: 2019-11-15 | Stop reason: HOSPADM

## 2019-11-14 RX ORDER — CLOPIDOGREL BISULFATE 75 MG/1
TABLET ORAL AS NEEDED
Status: DISCONTINUED | OUTPATIENT
Start: 2019-11-14 | End: 2019-11-14 | Stop reason: HOSPADM

## 2019-11-14 RX ORDER — ESCITALOPRAM OXALATE 10 MG/1
10 TABLET ORAL DAILY
COMMUNITY

## 2019-11-14 RX ORDER — SODIUM CHLORIDE 9 MG/ML
75 INJECTION, SOLUTION INTRAVENOUS CONTINUOUS
Status: DISCONTINUED | OUTPATIENT
Start: 2019-11-14 | End: 2019-11-15 | Stop reason: HOSPADM

## 2019-11-14 RX ORDER — HYDROXYCHLOROQUINE SULFATE 200 MG/1
200 TABLET, FILM COATED ORAL 2 TIMES DAILY
COMMUNITY

## 2019-11-14 RX ORDER — HEPARIN SODIUM 1000 [USP'U]/ML
INJECTION, SOLUTION INTRAVENOUS; SUBCUTANEOUS AS NEEDED
Status: DISCONTINUED | OUTPATIENT
Start: 2019-11-14 | End: 2019-11-14 | Stop reason: HOSPADM

## 2019-11-14 RX ORDER — HEPARIN SODIUM 200 [USP'U]/100ML
INJECTION, SOLUTION INTRAVENOUS
Status: COMPLETED | OUTPATIENT
Start: 2019-11-14 | End: 2019-11-14

## 2019-11-14 RX ORDER — MIDAZOLAM HYDROCHLORIDE 1 MG/ML
INJECTION, SOLUTION INTRAMUSCULAR; INTRAVENOUS AS NEEDED
Status: DISCONTINUED | OUTPATIENT
Start: 2019-11-14 | End: 2019-11-14 | Stop reason: HOSPADM

## 2019-11-14 RX ORDER — GUAIFENESIN 100 MG/5ML
162 LIQUID (ML) ORAL
Status: COMPLETED | OUTPATIENT
Start: 2019-11-14 | End: 2019-11-14

## 2019-11-14 RX ORDER — VERAPAMIL HYDROCHLORIDE 2.5 MG/ML
INJECTION, SOLUTION INTRAVENOUS AS NEEDED
Status: DISCONTINUED | OUTPATIENT
Start: 2019-11-14 | End: 2019-11-14 | Stop reason: HOSPADM

## 2019-11-14 RX ORDER — ACETAMINOPHEN 325 MG/1
650 TABLET ORAL
Status: DISCONTINUED | OUTPATIENT
Start: 2019-11-14 | End: 2019-11-15 | Stop reason: HOSPADM

## 2019-11-14 RX ORDER — ASPIRIN 81 MG/1
81 TABLET ORAL DAILY
Status: DISCONTINUED | OUTPATIENT
Start: 2019-11-15 | End: 2019-11-15 | Stop reason: HOSPADM

## 2019-11-14 RX ORDER — FENTANYL CITRATE 50 UG/ML
INJECTION, SOLUTION INTRAMUSCULAR; INTRAVENOUS AS NEEDED
Status: DISCONTINUED | OUTPATIENT
Start: 2019-11-14 | End: 2019-11-14 | Stop reason: HOSPADM

## 2019-11-14 RX ORDER — METOPROLOL SUCCINATE 100 MG/1
25 TABLET, EXTENDED RELEASE ORAL DAILY
COMMUNITY

## 2019-11-14 RX ORDER — METOPROLOL SUCCINATE 25 MG/1
25 TABLET, EXTENDED RELEASE ORAL DAILY
Status: DISCONTINUED | OUTPATIENT
Start: 2019-11-15 | End: 2019-11-15 | Stop reason: HOSPADM

## 2019-11-14 RX ORDER — CLOPIDOGREL BISULFATE 75 MG/1
75 TABLET ORAL DAILY
Status: DISCONTINUED | OUTPATIENT
Start: 2019-11-15 | End: 2019-11-15 | Stop reason: HOSPADM

## 2019-11-14 RX ORDER — ALLOPURINOL 100 MG/1
50 TABLET ORAL DAILY
Status: DISCONTINUED | OUTPATIENT
Start: 2019-11-15 | End: 2019-11-15 | Stop reason: HOSPADM

## 2019-11-14 RX ORDER — LIDOCAINE HYDROCHLORIDE 10 MG/ML
INJECTION, SOLUTION EPIDURAL; INFILTRATION; INTRACAUDAL; PERINEURAL AS NEEDED
Status: DISCONTINUED | OUTPATIENT
Start: 2019-11-14 | End: 2019-11-14 | Stop reason: HOSPADM

## 2019-11-14 RX ADMIN — SODIUM CHLORIDE 100 ML/HR: 900 INJECTION, SOLUTION INTRAVENOUS at 01:00

## 2019-11-14 RX ADMIN — ACETAMINOPHEN 650 MG: 325 TABLET ORAL at 18:06

## 2019-11-14 RX ADMIN — RANOLAZINE 1000 MG: 500 TABLET, FILM COATED, EXTENDED RELEASE ORAL at 21:55

## 2019-11-14 RX ADMIN — SODIUM CHLORIDE 75 ML/HR: 900 INJECTION, SOLUTION INTRAVENOUS at 21:55

## 2019-11-14 RX ADMIN — HEPARIN SODIUM AND DEXTROSE 8 UNITS/KG/HR: 10000; 5 INJECTION INTRAVENOUS at 09:23

## 2019-11-14 RX ADMIN — Medication 10 ML: at 15:57

## 2019-11-14 RX ADMIN — HEPARIN SODIUM 4000 UNITS: 5000 INJECTION INTRAVENOUS; SUBCUTANEOUS at 09:22

## 2019-11-14 RX ADMIN — ATORVASTATIN CALCIUM 20 MG: 20 TABLET, FILM COATED ORAL at 21:55

## 2019-11-14 RX ADMIN — ASPIRIN 81 MG 162 MG: 81 TABLET ORAL at 08:54

## 2019-11-14 RX ADMIN — RANOLAZINE 1000 MG: 500 TABLET, FILM COATED, EXTENDED RELEASE ORAL at 01:00

## 2019-11-14 RX ADMIN — ALLOPURINOL 100 MG: 100 TABLET ORAL at 08:54

## 2019-11-14 RX ADMIN — Medication 1 AMPULE: at 08:26

## 2019-11-14 RX ADMIN — RANOLAZINE 1000 MG: 500 TABLET, FILM COATED, EXTENDED RELEASE ORAL at 08:54

## 2019-11-14 RX ADMIN — METOPROLOL TARTRATE 25 MG: 25 TABLET ORAL at 08:54

## 2019-11-14 NOTE — PROGRESS NOTES
TRANSFER - OUT REPORT:    Verbal report given to TIFFANIE Laguna(name) on [facility]       Report consisted of patients Situation, Background, Assessment and   Recommendations(SBAR). Information from the following report(s) SBAR, Kardex, Procedure Summary, Intake/Output and Cardiac Rhythm NSR was reviewed with the receiving nurse. Lines:   Peripheral IV Right Antecubital (Active)   Site Assessment Clean, dry, & intact 11/14/2019  5:35 PM   Phlebitis Assessment 0 11/14/2019  5:35 PM   Infiltration Assessment 0 11/14/2019  5:35 PM   Dressing Status Clean, dry, & intact 11/14/2019  5:35 PM   Dressing Type Tape;Transparent 11/14/2019  5:35 PM   Hub Color/Line Status Green; Infusing;Patent 11/14/2019  5:35 PM        Opportunity for questions and clarification was provided.       Patient transported with:   Monitor  Registered Nurse

## 2019-11-14 NOTE — PROGRESS NOTES
received patient from cath l;ab. Alert,cooperative. TR band in place on right wrist.    EKG done. Patient on room air. Attempting to void.

## 2019-11-14 NOTE — PROGRESS NOTES
Pharmacy Medication Reconciliation     The patient was interviewed regarding current PTA medication list, use and drug allergies. The patient was questioned regarding use of any other inhalers, topical products, over the counter medications, herbal medications, vitamin products or ophthalmic/nasal/otic medication use. Allergy Update: Patient has no known allergies. Recommendations/Findings: The following amendments were made to the patient's active medication list on file at [facility]:   1) Additions: escitalopram, hydroxychlorquine    2) Deletions: celecoxib    3) Changes: Lopressor changed to Toprol. Patient confirmed that he splits his Toprol into quarters and takes 25 mg daily. Pertinent Findings:     -Clarified PTA med list with patient and outpatient pharmacy. PTA medication list was corrected to the following:     Prior to Admission Medications   Prescriptions Last Dose Informant Patient Reported? Taking?   allopurinol (ZYLOPRIM) 100 mg tablet   Yes No   Sig: Take 50 mg by mouth daily. apixaban (ELIQUIS) 5 mg tablet   No No   Sig: Take 1 Tab by mouth two (2) times a day. aspirin 81 mg chewable tablet   Yes No   Sig: Take 162 mg by mouth daily. atorvastatin (LIPITOR) 20 mg tablet   Yes No   Sig: Take 20 mg by mouth nightly. escitalopram oxalate (LEXAPRO) 10 mg tablet   Yes Yes   Sig: Take 10 mg by mouth daily. fluticasone propionate (FLONASE) 50 mcg/actuation nasal spray   Yes No   Si Sprays by Both Nostrils route daily as needed. hydroxychloroquine (PLAQUENIL) 200 mg tablet   Yes Yes   Sig: Take 200 mg by mouth two (2) times a day. metoprolol succinate (TOPROL XL) 100 mg tablet   Yes Yes   Sig: Take 25 mg by mouth daily. Patient reports that he splits this into quarters to save money and takes 25 mg daily   multivitamin (ONE A DAY) tablet   Yes No   Sig: Take 1 Tab by mouth daily. omeprazole (PRILOSEC) 10 mg capsule   Yes No   Sig: Take 20 mg by mouth daily.    ranolazine ER (RANEXA) 1,000 mg   No No   Sig: Take 1 Tab by mouth two (2) times a day.       Facility-Administered Medications: None          Thank you,  Nikolas Capps, PHARMD

## 2019-11-14 NOTE — PROGRESS NOTES
Hospitalist Progress Note    NAME: Alyssa Rosenthal Sr   :  1950   MRN:  [id number]       Assessment / Plan:  NSTEMI  -s/p cath and PCI  History of coronary artery disease status post balloon angioplasty 2018   Presented with acute coronary syndrome 2018  [place] at that time revealed LAD lesions was not suitable for PCI and it did show LAD aneurysm for which he was placed on Eliquis 2018  [address] cardiology consult/managment  To start heparin drip tomorrow morning since he already received his evening dose of Eliquis as per cardiology recommendations  -trop peaked at 7.35  -chest pain resolved post-cath  -A1C 5.7     Cr bump  Baseline Cr unknown  -likely 2/2 prerenal   -  cc/hr  -repeat AM bmp    Headache  -active smoking, start nicotine replacement     History of coronary artery disease  Management as above   Continue Ranexa and metoprolol     History of tobacco use  Active  -nicotine replacement as above  -s/p cessation counseling, extensive counseling on possible harms of continued smoking but patient continues to rationalize continued smoking and does not seem receptive to quitting     Code Status: Full code  Surrogate Decision Maker: His wife Juan Vasquez     DVT Prophylaxis: Heparin  GI Prophylaxis: not indicated     Baseline: Active, independent     Subjective:     Chief Complaint / Reason for Physician Visit  Patient complains of headache, no chest pain or palpitations. No other acute complaints    Discussed with RN events overnight. Review of Systems:  Symptom Y/N Comments  Symptom Y/N Comments   Fever/Chills n   Chest Pain n    Poor Appetite    Edema n    Cough n   Abdominal Pain     Sputum n   Joint Pain     SOB/KIM n   Pruritis/Rash     Nausea/vomit n   Tolerating PT/OT     Diarrhea n   Tolerating Diet y    Constipation n   Other       Could NOT obtain due to:      Objective:     VITALS:   Last 24hrs VS reviewed since prior progress note.  Most recent are:  Patient Vitals for the past 24 hrs:   Temp Pulse Resp BP SpO2   11/14/19 1759  67   98 %   11/14/19 1754  60   98 %   11/14/19 1749  (!) 56   97 %   11/14/19 1735 97.6 °F (36.4 °C) 62 20 150/64 97 %   11/14/19 1700  (!) 59 14 103/41 94 %   11/14/19 1630  (!) 52 16 116/47 96 %   11/14/19 1600  (!) 53 17 137/80 97 %   11/14/19 1500  (!) 50 18 154/78 96 %   11/14/19 1446  (!) 48 13  97 %   11/14/19 1445 97.6 °F (36.4 °C)   (!) 145/100    11/14/19 1200     98 %   11/14/19 1113  (!) 54 18 125/69 96 %   11/14/19 1100  (!) 57 23  97 %   11/14/19 1003    123/75    11/14/19 1000  (!) 50 15  96 %   11/14/19 0900  (!) 55 18 128/74 98 %   11/14/19 0800  (!) 55 15  95 %   11/14/19 0744 97.8 °F (36.6 °C) (!) 57 13  99 %   11/14/19 0700  (!) 53 14 123/75 94 %   11/14/19 0500  (!) 54 15 111/53 96 %   11/14/19 0400  (!) 55 16  95 %   11/14/19 0300  (!) 54 14 135/62 94 %   11/14/19 0200  (!) 58 14 127/60 94 %   11/14/19 0100  63 17 125/86 95 %   11/14/19 0033 98.1 °F (36.7 °C) (!) 59 18 137/83 100 %   11/13/19 2330  61  116/51 96 %   11/13/19 2230  64  128/59 99 %   11/13/19 2200  63  149/86 99 %   11/13/19 2000    144/78 99 %   11/13/19 1939 97.2 °F (36.2 °C) 71 16 (!) 160/95 100 %       Intake/Output Summary (Last 24 hours) at 11/14/2019 1828  Last data filed at 11/14/2019 1741  Gross per 24 hour   Intake 990.5 ml   Output 1270 ml   Net -279.5 ml        PHYSICAL EXAM:  General: WD, WN. Alert, cooperative, no acute distress    EENT:  EOMI. Anicteric sclerae. MMM  Resp:  CTA bilaterally, no wheezing or rales. No accessory muscle use  CV:  Regular  rhythm,  No edema  GI:  Soft, Non distended, Non tender.  +Bowel sounds  Neurologic:  Alert and oriented X 3, normal speech,   Psych:   Not anxious nor agitated, flat affect  Skin:  No rashes.   No jaundice    Reviewed most current lab test results and cultures  YES  Reviewed most current radiology test results   YES  Review and summation of old records today    NO  Reviewed patient's current orders and MAR    YES  PMH/SH reviewed - no change compared to H&P  ________________________________________________________________________  Care Plan discussed with:    Comments   Patient x    Family      RN x    Care Manager     Consultant                       x Multidiciplinary team rounds were held today with , nursing, pharmacist and clinical coordinator. Patient's plan of care was discussed; medications were reviewed and discharge planning was addressed. ________________________________________________________________________  Total NON critical care TIME: 30   Minutes    Total CRITICAL CARE TIME Spent:   Minutes non procedure based      Comments   >50% of visit spent in counseling and coordination of care x    ________________________________________________________________________  Saleem Fraga DO     Procedures: see electronic medical records for all procedures/Xrays and details which were not copied into this note but were reviewed prior to creation of Plan. LABS:  I reviewed today's most current labs and imaging studies.   Pertinent labs include:  Recent Labs     11/14/19 0448 11/13/19 2003   WBC 8.5 11.0   HGB 13.2 14.0   HCT 40.9 41.8    234     Recent Labs     11/14/19 0448 11/13/19 2003    143   K 4.2 3.9   * 111*   CO2 26 24   GLU 97 132*   BUN 20 22*   CREA 1.33* 1.56*   CA 8.5 9.0   ALB  --  3.8   TBILI  --  0.5   SGOT  --  16   ALT  --  28       Signed: Saleem Fraga DO Yes

## 2019-11-14 NOTE — PROGRESS NOTES
Telehospitalist: Patient admitted with NSTEMI last night. Inhouse nocturnist, Dr. Roxane Vasquez H&P appreciated. Trop now 4.44 per nurse, up from 0.66. Nurse will notify Dr. Roxane Vasquez for orders from Cardiology.  Patient denies chest pain at this time

## 2019-11-14 NOTE — PROGRESS NOTES
TELE-HOSPITALIST CROSS COVER NOTE:    Visit Vitals  /75   Pulse (!) 53   Temp 98.1 °F (36.7 °C)   Resp 14   Ht 6' 3\" (1.905 m)   Wt 120.2 kg (265 lb)   SpO2 94%   BMI 33.12 kg/m²       D/W RN; medical records reviewed; patient admitted for management of NSTEMI; Ti 7.35; continue AC with Heparin gtt per weight based protocol.       Jv Valiente

## 2019-11-14 NOTE — PROGRESS NOTES
0025 Patient arrived to CCU. Primary Nurse Loanne Barthel, TIFFANIE and Shraddha Gonzalez RN performed a dual skin assessment on this patient No impairment noted  Eran score is 20    Assessment complete. 0100 Notified tele hospitalist of critical troponin of 4.44. Her recommendation was to speak with in house hospitalist. Dr. Odell Leal referred back to tele hospitalist. Patient denies chest pain or SOB. VSS. Will continue to monitor. 0400 Reassessment complete. No changes from previous. 9563 Received notice from the lab that Troponin was 7.35.    0615 Notified tele hospitalist of critical results.     0700 Report given to Tanisha Varela RN

## 2019-11-14 NOTE — ACP (ADVANCE CARE PLANNING)
Advance Care Planning Note      NAME: Jovany Camilo Sr   :  1950   MRN:  [id number]     Date/Time:  2019 10:57 PM    Active Diagnoses:  Hospital Problems  Date Reviewed: 10/18/2019          Codes Class Noted POA    NSTEMI (non-ST elevated myocardial infarction) [facility]) ICD-10-CM: I21.4  ICD-9-CM: 410.70  2019 Unknown              These active diagnoses are of sufficient risk that focused discussion on advance care planning is indicated in order to allow the patient to thoughtfully consider personal goals of care, and if situations arise that prevent the ability to personally give input, to ensure appropriate representation of their personal desires for different levels and aggressiveness of care. Discussion:   Code status addressed and wants to be a Full Code. Patient wants central line and vasopressors if needed. Patient would also want a feeding tube, if needed, for nutritional support. Patient  would like to assign His wife Valentine Jamison  as the surrogate decision maker. Persons present and participating in discussion: Kisha Jj, Dina Donato MD,       Time Spent:   Total time spent face-to-face in education and discussion:   18  minutes.          Dina Donato MD   Hospitalist

## 2019-11-14 NOTE — INTERDISCIPLINARY ROUNDS
nterdisciplinary team rounds were held 11/14/2019 with the following team members:Care Management, Diabetes Treatment Specialist, Nursing, Nutrition, Palliative Care, Pharmacy, Physician and Respiratory Therapy Plan of care discussed. See clinical pathway and/or care plan for interventions and desired outcomes.

## 2019-11-14 NOTE — ED NOTES
TRANSFER - OUT REPORT:    Verbal report given to francy(name) on Lamonte Born Sr  being transferred to [facility](unit) for routine progression of care       Report consisted of patients Situation, Background, Assessment and   Recommendations(SBAR). Information from the following report(s) SBAR, ED Summary and MAR was reviewed with the receiving nurse. Lines:       Opportunity for questions and clarification was provided.       Patient transported with:   Janrain

## 2019-11-14 NOTE — ED PROVIDER NOTES
EMERGENCY DEPARTMENT HISTORY AND PHYSICAL EXAM      Date: 11/13/2019  Patient Name: Zofia Hollis  Patient Age and Sex: 77 y.o. male    History of Presenting Illness     Chief Complaint   Patient presents with    Chest Pain       History Provided By: Patient    Ability to gather history was limited by: None    HPI: Geneva Oh Sr, 77 y.o. male with history of CAD, one stent, 50+ pack years smoking, complains of sudden onset central chest pain described as a heaviness, starting about 6 to 8 hours ago. Pain was moderate intensity, nonradiating. He reports it is similar to when he had a heart attack in the past.  Minimal shortness of breath. He took a dose of Eliquis this evening prior to coming to the hospital.      Pt denies any other alleviating or exacerbating factors. There are no other complaints, changes or physical findings at this time. Past Medical History:   Diagnosis Date    CAD (coronary artery disease)     Cancer ([address]     bladder caner    Gastrointestinal disorder     GERD    Hypertension     Stroke ([address]     x's 2     Past Surgical History:   Procedure Laterality Date    ABDOMEN SURGERY PROC UNLISTED      Spleenectomy    CARDIAC SURG PROCEDURE UNLIST      Cardiac stent     HX ORTHOPAEDIC      Left toe surgery great toe    HX ORTHOPAEDIC      Left knee replacement    HX ORTHOPAEDIC      carpal tunnel left hand    HX ORTHOPAEDIC      back surgery    HX OTHER SURGICAL      bladder surgery for bladder cancer.        PCP: Jaycob Medina MD    Past History     Past Medical History:  Past Medical History:   Diagnosis Date    CAD (coronary artery disease)     Cancer ([address]     bladder caner    Gastrointestinal disorder     GERD    Hypertension     Stroke ([address]     x's 2       Past Surgical History:  Past Surgical History:   Procedure Laterality Date    ABDOMEN SURGERY PROC UNLISTED      Spleenectomy    CARDIAC SURG PROCEDURE UNLIST      Cardiac stent     HX ORTHOPAEDIC      Left toe surgery great toe    HX ORTHOPAEDIC      Left knee replacement    HX ORTHOPAEDIC      carpal tunnel left hand    HX ORTHOPAEDIC      back surgery    HX OTHER SURGICAL      bladder surgery for bladder cancer. Family History:  No family history on file. Social History:  Social History     Tobacco Use    Smoking status: Former Smoker     Packs/day: 1.00     Years: 57.00     Pack years: 57.00     Types: Cigarettes     Start date: 12/17/2018    Smokeless tobacco: Never Used   Substance Use Topics    Alcohol use: No    Drug use: No       Allergies:  No Known Allergies    Current Medications:  No current facility-administered medications on file prior to encounter. Current Outpatient Medications on File Prior to Encounter   Medication Sig Dispense Refill    ranolazine ER (RANEXA) 1,000 mg Take 1 Tab by mouth two (2) times a day. 180 Tab 3    apixaban (ELIQUIS) 5 mg tablet Take 1 Tab by mouth two (2) times a day. 180 Tab 3    allopurinol (ZYLOPRIM) 100 mg tablet       celecoxib (CELEBREX) 100 mg capsule       fluticasone propionate (FLONASE) 50 mcg/actuation nasal spray 2 Sprays by Both Nostrils route daily as needed.  atorvastatin (LIPITOR) 20 mg tablet Take 20 mg by mouth nightly.  metoprolol tartrate (LOPRESSOR) 25 mg tablet Take 25 mg by mouth daily.  aspirin 81 mg chewable tablet Take 162 mg by mouth daily.  multivitamin (ONE A DAY) tablet Take 1 Tab by mouth daily.  omeprazole (PRILOSEC) 10 mg capsule Take 20 mg by mouth daily. Review of Systems   Review of Systems   Cardiovascular: Positive for chest pain. All other systems reviewed and are negative.       Physical Exam   Vital Signs  Patient Vitals for the past 24 hrs:   Temp Pulse Resp BP SpO2   11/13/19 2230  64  128/59 99 %   11/13/19 2200  63  149/86 99 %   11/13/19 2000    144/78 99 %   11/13/19 1939 97.2 °F (36.2 °C) 71 16 (!) 160/95 100 %       Physical Exam   Constitutional: He is oriented to person, place, and time. He appears well-developed and well-nourished. No distress. HENT:   Head: Normocephalic and atraumatic. Eyes: Conjunctivae are normal. Right eye exhibits no discharge. Left eye exhibits no discharge. Neck: Normal range of motion. Neck supple. Cardiovascular: Normal rate, regular rhythm and normal heart sounds. No murmur heard. Pulmonary/Chest: Effort normal and breath sounds normal. No respiratory distress. He has no wheezes. Abdominal: Soft. He exhibits no distension. There is no tenderness. Musculoskeletal: Normal range of motion. He exhibits no deformity. Neurological: He is alert and oriented to person, place, and time. Skin: Skin is warm and dry. No rash noted. Psychiatric: He has a normal mood and affect. His behavior is normal. Thought content normal.   Nursing note and vitals reviewed. Diagnostic Study Results   Labs  Recent Results (from the past 24 hour(s))   EKG, 12 LEAD, INITIAL    Collection Time: 11/13/19  7:34 PM   Result Value Ref Range    Ventricular Rate 65 BPM    Atrial Rate 65 BPM    P-R Interval 198 ms    QRS Duration 106 ms    Q-T Interval 434 ms    QTC Calculation (Bezet) 451 ms    Calculated P Axis 67 degrees    Calculated R Axis 56 degrees    Calculated T Axis 56 degrees    Diagnosis       Normal sinus rhythm  Normal ECG  When compared with ECG of 10-DEC-2018 21:01,  Criteria for Septal infarct are no longer present  Criteria for Lateral infarct are no longer present     CBC WITH AUTOMATED DIFF    Collection Time: 11/13/19  8:03 PM   Result Value Ref Range    WBC 11.0 4.1 - 11.1 K/uL    RBC 4.25 4. 10 - 5.70 M/uL    HGB 14.0 12.1 - 17.0 g/dL    HCT 41.8 36.6 - 50.3 %    MCV 98.4 80.0 - 99.0 FL    MCH 32.9 26.0 - 34.0 PG    MCHC 33.5 30.0 - 36.5 g/dL    RDW 12.8 11.5 - 14.5 %    PLATELET 959 327 - 688 K/uL    MPV 12.1 8.9 - 12.9 FL    NRBC 0.0 0  WBC    ABSOLUTE NRBC 0.00 0.00 - 0.01 K/uL    NEUTROPHILS 69 32 - 75 % LYMPHOCYTES 19 12 - 49 %    MONOCYTES 9 5 - 13 %    EOSINOPHILS 2 0 - 7 %    BASOPHILS 1 0 - 1 %    IMMATURE GRANULOCYTES 0 0.0 - 0.5 %    ABS. NEUTROPHILS 7.6 1.8 - 8.0 K/UL    ABS. LYMPHOCYTES 2.1 0.8 - 3.5 K/UL    ABS. MONOCYTES 1.0 0.0 - 1.0 K/UL    ABS. EOSINOPHILS 0.2 0.0 - 0.4 K/UL    ABS. BASOPHILS 0.1 0.0 - 0.1 K/UL    ABS. IMM. GRANS. 0.0 0.00 - 0.04 K/UL    DF AUTOMATED     METABOLIC PANEL, COMPREHENSIVE    Collection Time: 11/13/19  8:03 PM   Result Value Ref Range    Sodium 143 136 - 145 mmol/L    Potassium 3.9 3.5 - 5.1 mmol/L    Chloride 111 (H) 97 - 108 mmol/L    CO2 24 21 - 32 mmol/L    Anion gap 8 5 - 15 mmol/L    Glucose 132 (H) 65 - 100 mg/dL    BUN 22 (H) 6 - 20 MG/DL    Creatinine 1.56 (H) 0.70 - 1.30 MG/DL    BUN/Creatinine ratio 14 12 - 20      GFR est AA 54 (L) >60 ml/min/1.73m2    GFR est non-AA 44 (L) >60 ml/min/1.73m2    Calcium 9.0 8.5 - 10.1 MG/DL    Bilirubin, total 0.5 0.2 - 1.0 MG/DL    ALT (SGPT) 28 12 - 78 U/L    AST (SGOT) 16 15 - 37 U/L    Alk. phosphatase 90 45 - 117 U/L    Protein, total 7.0 6.4 - 8.2 g/dL    Albumin 3.8 3.5 - 5.0 g/dL    Globulin 3.2 2.0 - 4.0 g/dL    A-G Ratio 1.2 1.1 - 2.2     CK W/ REFLX CKMB    Collection Time: 11/13/19  8:03 PM   Result Value Ref Range     39 - 308 U/L   TROPONIN I    Collection Time: 11/13/19  8:03 PM   Result Value Ref Range    Troponin-I, Qt. 1.24 (H) <0.05 ng/mL   POC TROPONIN-I    Collection Time: 11/13/19  8:06 PM   Result Value Ref Range    Troponin-I (POC) 0.66 (H) 0.00 - 0.08 ng/mL       Radiologic Studies  XR CHEST PORT   Final Result   IMPRESSION:       No acute process. CT Results  (Last 48 hours)    None        CXR Results  (Last 48 hours)               11/13/19 2025  XR CHEST PORT Final result    Impression:  IMPRESSION:        No acute process.            Narrative:  EXAM:  XR CHEST PORT       INDICATION:  chest pain       COMPARISON:  12/10/2018       FINDINGS:       A portable AP radiograph of the chest was obtained at 20:02 hours. The patient   is on a cardiac monitor. The lungs are clear. The cardiac and mediastinal   contours and pulmonary vascularity are normal.  The bones and soft tissues are   unremarkable. There has been no change since the prior study. Procedures   EKG  Date/Time: 11/13/2019 11:19 PM  Performed by: Catalino Schaeffer MD  Authorized by: Catalino Schaeffer MD     ECG reviewed by ED Physician in the absence of a cardiologist: yes    Interpretation:     Interpretation: non-specific    Rate:     ECG rate assessment: normal    Rhythm:     Rhythm: sinus rhythm    Ectopy:     Ectopy: none    QRS:     QRS axis:  Normal  ST segments:     ST segments:  Normal  T waves:     T waves: normal          Medical Decision Making     Provider Notes (Medical Decision Making):   68-year-old male with history of CAD, presenting with sudden onset central chest pain, with reassuring EKG, no signs of STEMI. Patient is noted to have positive troponin. His H&P is consistent with an STEMI. I spoke with his cardiologist Dr. Amber aDvis, who agreed with plan for nitroglycerin paste and aspirin. We discussed initiating heparin in the setting of recent Eliquis use. As patient took Eliquis just prior to presenting to the emergency department, can safely defer heparin at this time. No indication for emergent Cath Lab activation. No evidence of PE or pneumothorax or pneumonia. Amara Mccabe MD  11:18 PM        Consult required?   Cardiology      Medications Administered During ED Course:  Medications   morphine injection 4 mg (has no administration in time range)   allopurinol (ZYLOPRIM) tablet 100 mg (has no administration in time range)   atorvastatin (LIPITOR) tablet 20 mg (has no administration in time range)   metoprolol tartrate (LOPRESSOR) tablet 25 mg (has no administration in time range)   ranolazine ER (RANEXA) tablet 1,000 mg (has no administration in time range)   sodium chloride (NS) flush 5-40 mL (has no administration in time range)   sodium chloride (NS) flush 5-40 mL (has no administration in time range)   0.9% sodium chloride infusion (has no administration in time range)   morphine injection 1 mg (has no administration in time range)   nitroglycerin (NITROSTAT) tablet 0.4 mg (has no administration in time range)   heparin 25,000 units in D5W 250 ml infusion (has no administration in time range)   heparin (porcine) injection 4,000 Units (has no administration in time range)   heparin (porcine) injection 2,000 Units (has no administration in time range)     Or   heparin (porcine) injection 4,000 Units (has no administration in time range)   aspirin tablet 325 mg (325 mg Oral Given 11/13/19 2147)   nitroglycerin (NITROBID) 2 % ointment 0.5 Inch (0.5 Inches Topical Given 11/13/19 2148)          Diagnosis and Disposition     Disposition:  Admitted    Clinical Impression:   1. NSTEMI (non-ST elevated myocardial infarction) [facility])        Attestation:  I personally performed the services described in this documentation on this date 11/13/2019 for patient Jayme Merdano MD        I was the first provider for this patient on this visit. To the best of my ability I reviewed relevant prior medical records, electrocardiograms, laboratories, and radiologic studies. The patient's presenting problems were discussed, and the patient was in agreement with the care plan formulated and outlined with them. Ghislaine Rahman MD    Please note that this dictation was completed with Dragon voice recognition software. Quite often unanticipated grammatical, syntax, homophones, and other interpretive errors are inadvertently transcribed by the computer software. Please disregard these errors and excuse any errors that have escaped final proofreading.

## 2019-11-14 NOTE — H&P
Hospitalist Admission Note    NAME: Zuly Thorne Sr   :  1950   MRN:  [id number]     Date/Time:  2019 10:47 PM    Patient PCP: Kendall Valera MD  ______________________________________________________________________  Given the patient's current clinical presentation, I have a high level of concern for decompensation if discharged from the emergency department. Complex decision making was performed, which includes reviewing the patient's available past medical records, laboratory results, and x-ray films. My assessment of this patient's clinical condition and my plan of care is as follows. 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 for which he was placed on Eliquis 2018  Cardiology was called from the ED  To start heparin drip tomorrow morning since he already received his evening dose of Eliquis as per cardiology recommendations   Nitroglycerin and Motrin for pain as needed  Follow troponin every 6 hours  Repeat EKG tomorrow to make sure there is no evolving STEMI   LANE score 3  Obtain hemoglobin A1c for risk stratification    RIC  -likely 2/2 prerenal   -  cc/hr  -urine electrolytes and calculate FeNa    History of coronary artery disease  Management as above   Continue Ranexa and metoprolol    History of tobacco use  Active  Counseling was provided      Code Status: Full code  Surrogate Decision Maker: His wife Vikram Figueroa    DVT Prophylaxis: Heparin  GI Prophylaxis: not indicated    Baseline:  Active, independent      Subjective:   CHIEF COMPLAINT: Chest pain    HISTORY OF PRESENT ILLNESS:       The patient is a 77years old  man with past medical history of coronary artery disease, tobacco use presented to the emergency department due to substernal chest pain started in the afternoon today. He reported that he was sitting in a chair when he suddenly had substernal chest pain, nonradiating, 10/10 in severity, pressure-like, not associated shortness of breath, nausea or vomiting. Patient had acute coronary syndrome/NSTEMI November 2018 and at that time his cardiac catheterization showed multivessel disease was not suitable for PCI however he got balloon angioplasty. Repeated cath December 2018 revealed LAD thrombus for which he was started on Eliquis at that point. Patient smokes around one pack per day, does not drink alcohol or use illicit drugs. In the emergency department he was found to have elevated troponin 1.5, his EKG did not reveal acute ischemic changes compatible with STEMI. Cardiology was called and they recommended to treat as NSTEMI and started heparin drip. We were asked to admit for work up and evaluation of the above problems. Past Medical History:   Diagnosis Date    CAD (coronary artery disease)     Cancer ([facility] [place] [address]     bladder caner    Gastrointestinal disorder     GERD    Hypertension     Stroke ([facility] [place] [address]     x's 2        Past Surgical History:   Procedure Laterality Date    ABDOMEN SURGERY PROC UNLISTED      Spleenectomy    CARDIAC SURG PROCEDURE UNLIST      Cardiac stent     HX ORTHOPAEDIC      Left toe surgery great toe    HX ORTHOPAEDIC      Left knee replacement    HX ORTHOPAEDIC      carpal tunnel left hand    HX ORTHOPAEDIC      back surgery    HX OTHER SURGICAL      bladder surgery for bladder cancer. Social History     Tobacco Use    Smoking status: Former Smoker     Packs/day: 1.00     Years: 57.00     Pack years: 57.00     Types: Cigarettes     Start date: 12/17/2018    Smokeless tobacco: Never Used   Substance Use Topics    Alcohol use: No        No family history on file.   No Known Allergies     Prior to Admission medications    Medication Sig Start Date End Date Taking? Authorizing Provider   ranolazine ER (RANEXA) 1,000 mg Take 1 Tab by mouth two (2) times a day. 11/1/19   Jv Winkler NP   apixaban (ELIQUIS) 5 mg tablet Take 1 Tab by mouth two (2) times a day. 11/1/19   Jv Winkler NP   allopurinol (ZYLOPRIM) 100 mg tablet  8/23/19   Provider, Historical   celecoxib (CELEBREX) 100 mg capsule  1/8/19   Provider, Historical   fluticasone propionate (FLONASE) 50 mcg/actuation nasal spray 2 Sprays by Both Nostrils route daily as needed. 4/9/19   Provider, Historical   atorvastatin (LIPITOR) 20 mg tablet Take 20 mg by mouth nightly. Provider, Historical   metoprolol tartrate (LOPRESSOR) 25 mg tablet Take 25 mg by mouth daily. Mayra Cordova MD   aspirin 81 mg chewable tablet Take 162 mg by mouth daily. Mayra Cordova MD   multivitamin (ONE A DAY) tablet Take 1 Tab by mouth daily. Mayra Cordova MD   omeprazole (PRILOSEC) 10 mg capsule Take 20 mg by mouth daily. Mayra Cordova MD       REVIEW OF SYSTEMS:     I am not able to complete the review of systems because:    The patient is intubated and sedated    The patient has altered mental status due to his acute medical problems    The patient has baseline aphasia from prior stroke(s)    The patient has baseline dementia and is not reliable historian    The patient is in acute medical distress and unable to provide information           Total of 12 systems reviewed as follows:       POSITIVE= underlined text  Negative = text not underlined  General:  fever, chills, sweats, generalized weakness, weight loss/gain,      loss of appetite   Eyes:    blurred vision, eye pain, loss of vision, double vision  ENT:    rhinorrhea, pharyngitis   Respiratory:   cough, sputum production, SOB, KIM, wheezing, pleuritic pain   Cardiology:   chest pain, palpitations, orthopnea, PND, edema, syncope   Gastrointestinal:  abdominal pain , N/V, diarrhea, dysphagia, constipation, bleeding   Genitourinary:  frequency, urgency, dysuria, hematuria, incontinence   Muskuloskeletal :  arthralgia, myalgia, back pain  Hematology:  easy bruising, nose or gum bleeding, lymphadenopathy   Dermatological: rash, ulceration, pruritis, color change / jaundice  Endocrine:   hot flashes or polydipsia   Neurological:  headache, dizziness, confusion, focal weakness, paresthesia,     Speech difficulties, memory loss, gait difficulty  Psychological: Feelings of anxiety, depression, agitation    Objective:   VITALS:    Visit Vitals  /86   Pulse 63   Temp 97.2 °F (36.2 °C)   Resp 16   Ht 6' 3\" (1.905 m)   Wt 120.2 kg (265 lb)   SpO2 99%   BMI 33.12 kg/m²       PHYSICAL EXAM:    General:    Alert, cooperative, no distress, appears stated age. HEENT: Atraumatic, anicteric sclerae, pink conjunctivae     No oral ulcers, mucosa moist, throat clear, dentition fair  Neck:  Supple, symmetrical,  thyroid: non tender  Lungs:   Clear to auscultation bilaterally. No Wheezing or Rhonchi. No rales. Chest wall:  No tenderness  No Accessory muscle use. Heart:   Regular  rhythm,  No  murmur   No edema  Abdomen:   Soft, non-tender. Not distended. Bowel sounds normal  Extremities: No cyanosis. No clubbing,      Skin turgor normal, Capillary refill normal, Radial dial pulse 2+  Skin:     Not pale. Not Jaundiced  No rashes   Psych:  Good insight. Not depressed. Not anxious or agitated. Neurologic: EOMs intact. No facial asymmetry. No aphasia or slurred speech. Symmetrical strength, Sensation grossly intact.  Alert and oriented X 4.     _______________________________________________________________________  Care Plan discussed with:    Comments   Patient x    Family  x family   RN     Care Manager                    Consultant:      _______________________________________________________________________  Expected  Disposition:   Home with Family x   HH/PT/OT/RN    SNF/LTC    [facility] ________________________________________________________________________  TOTAL TIME:  50 Minutes    Critical Care Provided     Minutes non procedure based      Comments    x Reviewed previous records   >50% of visit spent in counseling and coordination of care x Discussion with patient and/or family and questions answered       ________________________________________________________________________  Signed: Clemente Frankel, MD    Procedures: see electronic medical records for all procedures/Xrays and details which were not copied into this note but were reviewed prior to creation of Plan. LAB DATA REVIEWED:    Recent Results (from the past 24 hour(s))   EKG, 12 LEAD, INITIAL    Collection Time: 11/13/19  7:34 PM   Result Value Ref Range    Ventricular Rate 65 BPM    Atrial Rate 65 BPM    P-R Interval 198 ms    QRS Duration 106 ms    Q-T Interval 434 ms    QTC Calculation (Bezet) 451 ms    Calculated P Axis 67 degrees    Calculated R Axis 56 degrees    Calculated T Axis 56 degrees    Diagnosis       Normal sinus rhythm  Normal ECG  When compared with ECG of 10-DEC-2018 21:01,  Criteria for Septal infarct are no longer present  Criteria for Lateral infarct are no longer present     CBC WITH AUTOMATED DIFF    Collection Time: 11/13/19  8:03 PM   Result Value Ref Range    WBC 11.0 4.1 - 11.1 K/uL    RBC 4.25 4. 10 - 5.70 M/uL    HGB 14.0 12.1 - 17.0 g/dL    HCT 41.8 36.6 - 50.3 %    MCV 98.4 80.0 - 99.0 FL    MCH 32.9 26.0 - 34.0 PG    MCHC 33.5 30.0 - 36.5 g/dL    RDW 12.8 11.5 - 14.5 %    PLATELET 262 202 - 771 K/uL    MPV 12.1 8.9 - 12.9 FL    NRBC 0.0 0  WBC    ABSOLUTE NRBC 0.00 0.00 - 0.01 K/uL    NEUTROPHILS 69 32 - 75 %    LYMPHOCYTES 19 12 - 49 %    MONOCYTES 9 5 - 13 %    EOSINOPHILS 2 0 - 7 %    BASOPHILS 1 0 - 1 %    IMMATURE GRANULOCYTES 0 0.0 - 0.5 %    ABS. NEUTROPHILS 7.6 1.8 - 8.0 K/UL    ABS. LYMPHOCYTES 2.1 0.8 - 3.5 K/UL    ABS. MONOCYTES 1.0 0.0 - 1.0 K/UL    ABS.  EOSINOPHILS 0.2 0.0 - 0.4 K/UL    ABS. BASOPHILS 0.1 0.0 - 0.1 K/UL    ABS. IMM. GRANS. 0.0 0.00 - 0.04 K/UL    DF AUTOMATED     METABOLIC PANEL, COMPREHENSIVE    Collection Time: 11/13/19  8:03 PM   Result Value Ref Range    Sodium 143 136 - 145 mmol/L    Potassium 3.9 3.5 - 5.1 mmol/L    Chloride 111 (H) 97 - 108 mmol/L    CO2 24 21 - 32 mmol/L    Anion gap 8 5 - 15 mmol/L    Glucose 132 (H) 65 - 100 mg/dL    BUN 22 (H) 6 - 20 MG/DL    Creatinine 1.56 (H) 0.70 - 1.30 MG/DL    BUN/Creatinine ratio 14 12 - 20      GFR est AA 54 (L) >60 ml/min/1.73m2    GFR est non-AA 44 (L) >60 ml/min/1.73m2    Calcium 9.0 8.5 - 10.1 MG/DL    Bilirubin, total 0.5 0.2 - 1.0 MG/DL    ALT (SGPT) 28 12 - 78 U/L    AST (SGOT) 16 15 - 37 U/L    Alk.  phosphatase 90 45 - 117 U/L    Protein, total 7.0 6.4 - 8.2 g/dL    Albumin 3.8 3.5 - 5.0 g/dL    Globulin 3.2 2.0 - 4.0 g/dL    A-G Ratio 1.2 1.1 - 2.2     CK W/ REFLX CKMB    Collection Time: 11/13/19  8:03 PM   Result Value Ref Range     39 - 308 U/L   TROPONIN I    Collection Time: 11/13/19  8:03 PM   Result Value Ref Range    Troponin-I, Qt. 1.24 (H) <0.05 ng/mL   POC TROPONIN-I    Collection Time: 11/13/19  8:06 PM   Result Value Ref Range    Troponin-I (POC) 0.66 (H) 0.00 - 0.08 ng/mL

## 2019-11-14 NOTE — CDMP QUERY
Documentation of Sayda Orchard is noted in the progress notes. Currently the patient does not meet RIFLE criteria (BSV approved) to support this diagnosis. If you are using another criteria to support this diagnosis, please document this in your progress note. Otherwise, please document in the progress notes the clinical indicators that support this diagnosis or state that the diagnosis has been ruled out. RIFLE  (BSV Approved) RISK:  Increased SCr x 1.5 or GFR decrease > 25% (within 7 days) INJURY:  Increased SCr x 2.0 or GFR decreased > 50% FAILURE:  Increased SCr x 3.0 or GFR decrease > 75% or SCr >4.0 mg/dL or acute increase >0.5 mg/dL LOSS:  Persistent acute renal failure = complete loss of kidney function > 4 weeks END STAGE:  End stage of kidney disease > 3 months AKIN 
 
STAGE  1:  Increase in SCr >/= 0.3 mg/dL or >/= 150% to 200% (1.5 to 2-fold) from baseline (within 48 hours) STAGE  2:  Increase in SCr to more than 200% to 300% (>2-3 fold) from baseline STAGE  3:  Increase in SCr to more than 300% (>3-fold) from baseline or SCr >/= 4.0 mg/dL with an acute increase of at least 0.5 mg/dL or initiation of renal replacement therapy KDIGO 
 
STAGE  1:  Increase in SCr by >/= 0.3 mg/dL within 48 hours or increase in SCr 1.5 to 1.9 times baseline which is known or presumed to have occurred within the prior 7 days STAGE  2:  Increase in SCr to 2.0 to 2.9 times baseline STAGE  3:  Increase in SCr to 3.0 times baseline or increase in SCr to >/= baseline or increase in SCr to >/= 4.0 mg/dL or initiation of renal replacement therapy Please clarify and document your clinical opinion in the progress notes and discharge summary including the definitive and/or presumptive diagnosis, (suspected or probable), related to the above clinical findings. Please include clinical findings supporting your diagnosis. Thanks, Luisa Hurtado RN/CDMP

## 2019-11-14 NOTE — PROGRESS NOTES
5:35 PM Patient arrived to room 2161 via wheelchair. TR band present R radial c/d/i no bleeding and no hematoma. Ecchymosis noted on arrival. Pulses palpable all extremities. VSS. Assessment complete. Patient oriented to room and call bell system. Bed in lowest position. Call bell within reach. Will continue to monitor closely. 6:00 PM TR band taken down by RN, Reinier Frye, without complications. New dressing applied is c/d/i no bleeding and no hematoma. Will continue to monitor. 7:15 PM Bedside report given to Ami Guido RN.

## 2019-11-14 NOTE — PROGRESS NOTES
0700 Bedside and Verbal shift change report given to Cate Tinoco (oncoming nurse) by Lou Ulrich RN (offgoing nurse). Report included the following information SBAR, Kardex, Intake/Output, MAR, Recent Results and Cardiac Rhythm SBrady. 0735 EKG ordered last night completed & on chart. [place] completed. 8314 Dr. Fredy Taylor at bedside serial troponin due at 1045 not necessary. Reviewed EKG. Plan is for cath lab at 1230. OK to give all PO meds now. Will draw PTT & start heparin drip as ordered. 3592 Urine labs sent down. 24hr urine collection started by dumping this void. Rounds: 24hr urine d/c'd. Orders to transfer to [facility] following cath lab.    6900 Consent for cath signed & on chart. 1010 Echo at bedside. 1151 Bedside and Verbal shift change report given to Josué Lopez RN (oncoming nurse) by Bridget Anthony RN (offgoing nurse). Report included the following information SBAR, Kardex, Intake/Output, MAR, Recent Results and Cardiac Rhythm Austin Merida.

## 2019-11-14 NOTE — ED TRIAGE NOTES
Constant midsternal chest pain that started at 1500. Denies shortness of breath. Denies nausea, vomiting, diarrhea or sweating.     Denies cough

## 2019-11-14 NOTE — CONSULTS
[address], [facility], [address] [facility]     Date of  Admission: 11/13/2019  7:41 PM     Admission type:Emergency    Consult for: NSTEMI  Consult by: hospitalist     Subjective:     Peggy Alford is a 77 y.o. male admitted for NSTEMI (non-ST elevated myocardial infarction) ([address] [I21.4]. Admitted with c/o 10/10 CP, SOB, nausea while hunting yesterday, was not doing any exertional activity at the time of onset. ECG SR with no acute changes, Troponin peak 7. Follows with Dr. Zoila Grace. Normal stress echo 10/18/19    PMH:  Cardiac cath 12/2018 coronary thrombus/aneurysm in the setting of ACS, started on Eliquis, history of CVA, previous intervention on LAD  in November 2018    Previous treatment/evaluation includes Percutaneous Coronary Intervention and echocardiogram . Cardiac risk factors: smoking/ tobacco exposure, family history, dyslipidemia, obesity, sedentary life style, male gender, hypertension.       Patient Active Problem List    Diagnosis Date Noted    Tobacco abuse 11/14/2019    NSTEMI (non-ST elevated myocardial infarction) ([address] 11/13/2019    SOB (shortness of breath) on exertion 12/28/2018    Coronary artery disease involving native coronary artery of native heart with unstable angina pectoris ([address] 12/28/2018    Status post cardiac catheterization 12/28/2018    ASHD (arteriosclerotic heart disease) 12/28/2018    Mixed hyperlipidemia 12/28/2018    ACS (acute coronary syndrome) ([address] 11/29/2018      Rhonda Quintanilla MD  Past Medical History:   Diagnosis Date    CAD (coronary artery disease)     Cancer ([address]     bladder caner    Gastrointestinal disorder     GERD    Hypertension     Stroke ([address]     x's 2    Tobacco abuse 11/14/2019      Social History     Socioeconomic History    Marital status:      Spouse name: Not on file    Number of children: Not on file    Years of education: Not on file    Highest education level: Not on file   Tobacco Use    Smoking status: Former Smoker     Packs/day: 1.00     Years: 57.00     Pack years: 57.00     Types: Cigarettes     Start date: 12/17/2018    Smokeless tobacco: Never Used   Substance and Sexual Activity    Alcohol use: No    Drug use: No     No Known Allergies   No family history on file.    Current Facility-Administered Medications   Medication Dose Route Frequency    alcohol 62% (NOZIN) nasal  1 Ampule  1 Ampule Topical Q12H    [START ON 11/15/2019] aspirin delayed-release tablet 81 mg  81 mg Oral DAILY    morphine injection 4 mg  4 mg IntraVENous Q4H PRN    allopurinol (ZYLOPRIM) tablet 100 mg  100 mg Oral DAILY    atorvastatin (LIPITOR) tablet 20 mg  20 mg Oral QHS    metoprolol tartrate (LOPRESSOR) tablet 25 mg  25 mg Oral DAILY    ranolazine ER (RANEXA) tablet 1,000 mg  1,000 mg Oral Q12H    sodium chloride (NS) flush 5-40 mL  5-40 mL IntraVENous Q8H    sodium chloride (NS) flush 5-40 mL  5-40 mL IntraVENous PRN    morphine injection 1 mg  1 mg IntraVENous Q4H PRN    nitroglycerin (NITROSTAT) tablet 0.4 mg  0.4 mg SubLINGual PRN    heparin 25,000 units in D5W 250 ml infusion  8-25 Units/kg/hr IntraVENous TITRATE    heparin (porcine) injection 2,000 Units  2,000 Units IntraVENous PRN    Or    heparin (porcine) injection 4,000 Units  4,000 Units IntraVENous PRN        Review of Symptoms:   11 systems reviewed, negative other than as stated in the HPI        Objective:      Visit Vitals  /75   Pulse (!) 50   Temp 97.8 °F (36.6 °C)   Resp 15   Ht 6' 3\" (1.905 m)   Wt 120.2 kg (264 lb 15.9 oz)   SpO2 96%   BMI 33.12 kg/m²       Physical:   General: older slightly obese  male in no acute distress  Heart: RRR, no m/S3/JVD, no carotid bruits   Lungs: clear   Abdomen: Soft, +BS, NTND   Extremities: LE vilma , no edema   Neurologic: Grossly normal    Data Review:   Recent Labs     11/14/19  0448 11/13/19 2003   WBC 8.5 11.0   HGB 13.2 14.0   HCT 40.9 41.8    234     Recent Labs     11/14/19 0448 11/13/19 2003    143   K 4.2 3.9   * 111*   CO2 26 24   GLU 97 132*   BUN 20 22*   CREA 1.33* 1.56*   CA 8.5 9.0   ALB  --  3.8   TBILI  --  0.5   SGOT  --  16   ALT  --  28       Recent Labs     11/14/19 0448 11/13/19 2355 11/13/19 2003   TROIQ 7.35* 4.44* 1.24*         Intake/Output Summary (Last 24 hours) at 11/14/2019 1054  Last data filed at 11/14/2019 1000  Gross per 24 hour   Intake 744.25 ml   Output 375 ml   Net 369.25 ml        Cardiographics    Telemetry: SR  ECG: SR with no acute change    Echocardiogram: pending    Cardiac cath 12/18:  CORONARY CIRCULATION:  --  LAD: The vessel arose anomalously from a separate origin in the L cor  sinus  --  Proximal LAD: There was a tubular 50 % stenosis. --  Mid LAD: There was a tubular 50 % stenosis. --  1ST LESION INTERVENTIONS:  --  A was performed on the lesion in the mid LAD. --  Steady baseline values were obtained. Mean arterial pressure and mean  distal coronary pressures were then obtained at maximum hyperemia. FFR was  calculated to be 0.84. Based on the results of this study, the lesion was  judged to be non-significant and no intervention was performed. CXRAY: no        Assessment:       Active Problems:    SOB (shortness of breath) on exertion (12/28/2018)      Coronary artery disease involving native coronary artery of native heart with unstable angina pectoris ([facility] [address] (12/28/2018)      Mixed hyperlipidemia (12/28/2018)      NSTEMI (non-ST elevated myocardial infarction) ([facility] [address] (11/13/2019)      Tobacco abuse (11/14/2019)         Plan:     NSTEMI:  Pain free currently, plan for cardiac cath today with Dr. Sivan Gibson  I discussed the risks/benefits/alternatives of cardiac catheterization +/- PCI with the patient. Risks include (but are not limited to) bleeding, infection, cva/mi/tamponade/death. The patient understands and wishes to proceed.   Continue on ASA, BB, statin, ranexa, heparin  IV hydration pre cath. Echo pending. Thank you for consulting [facility]. Butch Chaudhari NP     Patient seen and examined by me with the above nurse practitioner. I personally performed all components of the history, physical, and medical decision making and agree with the assessment and plan with minor modifications as noted. Significant troponin elevation, suspicious for non-ST elevation MI despite catheterization findings as noted above not too long ago. Repeat catheterization today. Discussed with Dr. Myra Galarza.

## 2019-11-15 ENCOUNTER — HOME HEALTH ADMISSION (OUTPATIENT)
Dept: HOME HEALTH SERVICES | Facility: HOME HEALTH | Age: 69
End: 2019-11-15

## 2019-11-15 VITALS
TEMPERATURE: 98.1 F | SYSTOLIC BLOOD PRESSURE: 138 MMHG | OXYGEN SATURATION: 99 % | RESPIRATION RATE: 16 BRPM | BODY MASS INDEX: 32.95 KG/M2 | WEIGHT: 264.99 LBS | HEIGHT: 75 IN | HEART RATE: 63 BPM | DIASTOLIC BLOOD PRESSURE: 79 MMHG

## 2019-11-15 LAB
ACT BLD: 246 SECS (ref 79–138)
ACT BLD: 263 SECS (ref 79–138)
ACT BLD: 274 SECS (ref 79–138)
ANION GAP SERPL CALC-SCNC: 6 MMOL/L (ref 5–15)
APTT PPP: 41.2 SEC (ref 22.1–32)
BUN SERPL-MCNC: 17 MG/DL (ref 6–20)
BUN/CREAT SERPL: 15 (ref 12–20)
CALCIUM SERPL-MCNC: 8.3 MG/DL (ref 8.5–10.1)
CHLORIDE SERPL-SCNC: 112 MMOL/L (ref 97–108)
CO2 SERPL-SCNC: 23 MMOL/L (ref 21–32)
CREAT SERPL-MCNC: 1.17 MG/DL (ref 0.7–1.3)
ERYTHROCYTE [DISTWIDTH] IN BLOOD BY AUTOMATED COUNT: 13 % (ref 11.5–14.5)
GLUCOSE SERPL-MCNC: 92 MG/DL (ref 65–100)
HCT VFR BLD AUTO: 38.4 % (ref 36.6–50.3)
HGB BLD-MCNC: 12.9 G/DL (ref 12.1–17)
MCH RBC QN AUTO: 32.5 PG (ref 26–34)
MCHC RBC AUTO-ENTMCNC: 33.6 G/DL (ref 30–36.5)
MCV RBC AUTO: 96.7 FL (ref 80–99)
NRBC # BLD: 0 K/UL (ref 0–0.01)
NRBC BLD-RTO: 0 PER 100 WBC
PLATELET # BLD AUTO: 202 K/UL (ref 150–400)
PMV BLD AUTO: 12.3 FL (ref 8.9–12.9)
POTASSIUM SERPL-SCNC: 4.3 MMOL/L (ref 3.5–5.1)
RBC # BLD AUTO: 3.97 M/UL (ref 4.1–5.7)
SODIUM SERPL-SCNC: 141 MMOL/L (ref 136–145)
THERAPEUTIC RANGE,PTTT: ABNORMAL SECS (ref 58–77)
WBC # BLD AUTO: 8.5 K/UL (ref 4.1–11.1)

## 2019-11-15 PROCEDURE — 85027 COMPLETE CBC AUTOMATED: CPT

## 2019-11-15 PROCEDURE — 80048 BASIC METABOLIC PNL TOTAL CA: CPT

## 2019-11-15 PROCEDURE — 74011250637 HC RX REV CODE- 250/637: Performed by: INTERNAL MEDICINE

## 2019-11-15 PROCEDURE — 36415 COLL VENOUS BLD VENIPUNCTURE: CPT

## 2019-11-15 PROCEDURE — 74011250637 HC RX REV CODE- 250/637: Performed by: NURSE PRACTITIONER

## 2019-11-15 PROCEDURE — 85730 THROMBOPLASTIN TIME PARTIAL: CPT

## 2019-11-15 RX ORDER — NITROGLYCERIN 0.4 MG/1
0.4 TABLET SUBLINGUAL AS NEEDED
Qty: 1 BOTTLE | Refills: 1 | Status: SHIPPED | OUTPATIENT
Start: 2019-11-15

## 2019-11-15 RX ORDER — ASPIRIN 81 MG/1
81 TABLET ORAL DAILY
Qty: 30 TAB | Refills: 11 | Status: SHIPPED | OUTPATIENT
Start: 2019-11-16

## 2019-11-15 RX ORDER — CLOPIDOGREL BISULFATE 75 MG/1
75 TABLET ORAL DAILY
Qty: 30 TAB | Refills: 11 | Status: SHIPPED | OUTPATIENT
Start: 2019-11-16 | End: 2019-11-25 | Stop reason: SDUPTHER

## 2019-11-15 RX ADMIN — ASPIRIN 81 MG: 81 TABLET, COATED ORAL at 09:32

## 2019-11-15 RX ADMIN — CLOPIDOGREL BISULFATE 75 MG: 75 TABLET ORAL at 09:03

## 2019-11-15 RX ADMIN — APIXABAN 5 MG: 5 TABLET, FILM COATED ORAL at 09:08

## 2019-11-15 RX ADMIN — RANOLAZINE 1000 MG: 500 TABLET, FILM COATED, EXTENDED RELEASE ORAL at 09:04

## 2019-11-15 NOTE — PROGRESS NOTES
7:00 AM Bedside report received from TIFFANIE Manning.    8:30 AM Per Lorene Argueta NP, give plavix and eliquis and hold ASA. See MAR. NP aware patient adamantly refuses to take Toprol XL this morning and states, \"I take that at night and I will take it tonight at home. \"    9:15 AM Dr. Betty Garcia at bedside. MD aware plavix and eliquis given. MD states patient to take ASA and plavix on discharge and patient will stop eliquis for now. MD states to give ASA. See MAR.    11:00 AM Patient up ambulating in hallway self assist and tolerating well. Patient specifically denies c/o pain, shortness of breath and dizziness. R radial site c/d/i no bleeding and no hematoma. 12:00 PM PIV removed, tip intact. Discharge instructions reviewed with patient and spouse including, but not limited to, post cath site care, medications and follow up appointments. Patient and spouse given opportunity for questions and verbalized understanding of all instructions. Patient to  prescriptions from his pharmacy. Patient has all belongings and discharge instructions on discharge. R radial site remains c/d/i no bleeding and no hematoma. Patient escorted to private vehicle via wheelchair. Patient's spouse to drive him home.

## 2019-11-15 NOTE — PROGRESS NOTES
[address]  [phone]      Cardiology Progress Note      11/15/2019 9:55 AM    Admit Date: 11/13/2019    Admit Diagnosis:   NSTEMI (non-ST elevated myocardial infarction) ([facility] [address] [I21.4]    Subjective:     Melanie Almonte Sr has no c/o CP, SOB. S/P PCI/KULDEPE to Lcx,  RCA, LV thrombus. Visit Vitals  /58   Pulse 62   Temp 98.1 °F (36.7 °C)   Resp 16   Ht 6' 3\" (1.905 m)   Wt 120.2 kg (264 lb 15.9 oz)   SpO2 96%   BMI 33.12 kg/m²       Current Facility-Administered Medications   Medication Dose Route Frequency    alcohol 62% (NOZIN) nasal  1 Ampule  1 Ampule Topical Q12H    aspirin delayed-release tablet 81 mg  81 mg Oral DAILY    metoprolol succinate (TOPROL-XL) XL tablet 25 mg  25 mg Oral DAILY    allopurinol (ZYLOPRIM) tablet 50 mg  50 mg Oral DAILY    clopidogrel (PLAVIX) tablet 75 mg  75 mg Oral DAILY    apixaban (ELIQUIS) tablet 5 mg  5 mg Oral BID    acetaminophen (TYLENOL) tablet 650 mg  650 mg Oral Q4H PRN    0.9% sodium chloride infusion  75 mL/hr IntraVENous CONTINUOUS    nicotine (NICODERM CQ) 21 mg/24 hr patch 1 Patch  1 Patch TransDERmal DAILY    atorvastatin (LIPITOR) tablet 20 mg  20 mg Oral QHS    ranolazine ER (RANEXA) tablet 1,000 mg  1,000 mg Oral Q12H    sodium chloride (NS) flush 5-40 mL  5-40 mL IntraVENous Q8H    sodium chloride (NS) flush 5-40 mL  5-40 mL IntraVENous PRN    morphine injection 1 mg  1 mg IntraVENous Q4H PRN    nitroglycerin (NITROSTAT) tablet 0.4 mg  0.4 mg SubLINGual PRN       Objective:      Physical Exam:  General Appearance:  older    Chest:   Clear  Cardiovascular:  Regular rate and rhythm, no murmur. Abdomen:   Soft, non-tender, bowel sounds are active. Extremities: right radial site D/I, no hematoma  Skin:  Warm and dry.      Data Review:   Recent Labs     11/15/19  0337 11/14/19  0448 11/13/19 2003   WBC 8.5 8.5 11.0   HGB 12.9 13.2 14.0   HCT 38.4 40.9 41.8    223 234 Recent Labs     11/15/19  0337 11/14/19  0448 11/13/19  2003    144 143   K 4.3 4.2 3.9   * 112* 111*   CO2 23 26 24   GLU 92 97 132*   BUN 17 20 22*   CREA 1.17 1.33* 1.56*   CA 8.3* 8.5 9.0   ALB  --   --  3.8   TBILI  --   --  0.5   SGOT  --   --  16   ALT  --   --  28       Recent Labs     11/14/19 0448 11/13/19 2355 11/13/19 2003   TROIQ 7.35* 4.44* 1.24*         Intake/Output Summary (Last 24 hours) at 11/15/2019 0955  Last data filed at 11/15/2019 0743  Gross per 24 hour   Intake 1609.67 ml   Output 1920 ml   Net -310.33 ml        Telemetry: SR  EKG:SR with no acute distress    Assessment:     Active Problems:    SOB (shortness of breath) on exertion (12/28/2018)      Coronary artery disease involving native coronary artery of native heart with unstable angina pectoris ([facility] [address] (12/28/2018)      Status post cardiac catheterization (12/28/2018)      Overview: 11/14/19: KULDEEP to Cx and KULDEEP to  of RCA. 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 Dr. Adama Laureano is discontinuing Eliquis as he feels the LV thrombus is small   Continue ASA 81mg daily, BB, statin, Ranexa    Tobacco Abuse:  Counseling for smoking cessation    OK for discharge. F/u with Dr. Adama Laureano 2 weeks.       Parker Morales [facility]  Cardiology

## 2019-11-15 NOTE — PROGRESS NOTES
[facility] can accept and provide services.     [facility]  Ext [phone]

## 2019-11-15 NOTE — DISCHARGE SUMMARY
Hospitalist Discharge Summary     Patient ID:  Lamonte Minor Sr  [id number]  77 y.o.  1950 11/13/2019    PCP on record: Grisel Munguia MD    Admit date: 11/13/2019  Discharge date and time: 11/15/2019    DISCHARGE DIAGNOSIS:    See below    CONSULTATIONS:  IP CONSULT TO CARDIOLOGY  IP CONSULT TO HOSPITALIST    Excerpted HPI from H&P of Clemente Frankel, MD:  The patient is a 77years old  man with past medical history of coronary artery disease, tobacco use presented to the emergency department due to substernal chest pain started in the afternoon today. He reported that he was sitting in a chair when he suddenly had substernal chest pain, nonradiating, 10/10 in severity, pressure-like, not associated shortness of breath, nausea or vomiting. Patient had acute coronary syndrome/NSTEMI November 2018 and at that time his cardiac catheterization showed multivessel disease was not suitable for PCI however he got balloon angioplasty. Repeated cath December 2018 revealed LAD thrombus for which he was started on Eliquis at that point.     Patient smokes around one pack per day, does not drink alcohol or use illicit drugs.     In the emergency department he was found to have elevated troponin 1.5, his EKG did not reveal acute ischemic changes compatible with STEMI. Cardiology was called and they recommended to treat as NSTEMI and started heparin drip.  ______________________________________________________________________  DISCHARGE SUMMARY/HOSPITAL COURSE:  for full details see H&P, daily progress notes, labs, consult notes.      NSTEMI  -s/p cath and PCI  [facility] of coronary artery disease status post balloon angioplasty November 2018   Presented with acute coronary syndrome November 2018  Anatoly Garcia at that time revealed LAD lesions was not suitable for PCI and it did show LAD aneurysm for which he was placed on Eliquis December 2018  [address] cardiology consult/managment  To start heparin drip tomorrow morning since he already received his evening dose of Eliquis as per cardiology recommendations  -trop peaked at 7.35  -chest pain resolved post-cath  -A1C 5.7     Cr bump  Baseline Cr unknown,  -likely 2/2 prerenal   -Cr 1.56 POA, trending down  - s/p IVF     Headache  -active smoking, start nicotine replacement     History of coronary artery disease  Management as above   Continue Ranexa and metoprolol     History of tobacco use  Active  -nicotine replacement as above  -s/p cessation counseling, extensive counseling on possible harms of continued smoking but patient continues to rationalize continued smoking and does not seem receptive to quitting  _______________________________________________________________________  Patient seen and examined by me on discharge day. Pertinent Findings:  Gen:    Not in distress  Chest: Clear lungs  CVS:   Regular rhythm. No edema  Abd:  Soft, not distended, not tender  Neuro:  Alert, oriented x 3  _______________________________________________________________________  DISCHARGE MEDICATIONS:   Current Discharge Medication List      START taking these medications    Details   clopidogrel (PLAVIX) 75 mg tab Take 1 Tab by mouth daily. Qty: 30 Tab, Refills: 11      nitroglycerin (NITROSTAT) 0.4 mg SL tablet 1 Tab by SubLINGual route as needed for Chest Pain. Up to 3 doses. Qty: 1 Bottle, Refills: 1         CONTINUE these medications which have NOT CHANGED    Details   metoprolol succinate (TOPROL XL) 100 mg tablet Take 25 mg by mouth daily. Patient reports that he splits this into quarters to save money and takes 25 mg daily      hydroxychloroquine (PLAQUENIL) 200 mg tablet Take 200 mg by mouth two (2) times a day. escitalopram oxalate (LEXAPRO) 10 mg tablet Take 10 mg by mouth daily. ranolazine ER (RANEXA) 1,000 mg Take 1 Tab by mouth two (2) times a day.   Qty: 180 Tab, Refills: 3      allopurinol (ZYLOPRIM) 100 mg tablet Take 50 mg by mouth daily. fluticasone propionate (FLONASE) 50 mcg/actuation nasal spray 2 Sprays by Both Nostrils route daily as needed. atorvastatin (LIPITOR) 20 mg tablet Take 20 mg by mouth nightly. aspirin 81 mg chewable tablet Take 162 mg by mouth daily. multivitamin (ONE A DAY) tablet Take 1 Tab by mouth daily. omeprazole (PRILOSEC) 10 mg capsule Take 20 mg by mouth daily. STOP taking these medications       apixaban (ELIQUIS) 5 mg tablet Comments:   Reason for Stopping:               Patient Follow Up Instructions:    Activity/wound care: per cardiology instructions  Diet: Cardiac Diet    Follow-up with PCP in one week, Dr Zoila Grace as scheduled below  Follow-up tests/labs none pending  Follow-up Information     Follow up With Specialties Details Why Contact Info    Nenita Turcios MD Cardiology, [address] [place] [address] Vascular Surgery On 11/26/2019 at 11:30AM at [address]  [facility]  [phone]      Rhonda Quintanilla MD Family Practice Go on 11/19/2019 Hospital follow-up scheduled at 2:20pm Please bring Insurance Card, Discharge paperwork, Photo ID, list of medications and any co-pay you may have  ( If you have questions or need to reschedule please call [phone] [address]  [phone]          ________________________________________________________________    Risk of deterioration: Moderate    Condition at Discharge:  Stable  __________________________________________________________________    Disposition  Home with family, no needs    ____________________________________________________________________    Code Status: Full Code  ___________________________________________________________________      Total time in minutes spent coordinating this discharge (includes going over instructions, follow-up, prescriptions, and preparing report for sign off to her PCP) :  35 minutes    Signed:  Yvrose Shore DO

## 2019-11-15 NOTE — CARDIO/PULMONARY
Cardiac Rehab Note: chart review Pt is a 78 yo admitting diagnosis NSTEMI, s/p PCI/KULDEEP 11/14/19 and prior stent in the past.  
 
Smoking history assessed. Patient is a current smoker. Smoking Cessation Program link has been added to the AVS. Pt provided with verbal smoking cessation counseling. He reports having cut back the number of cigarettes per day, however, is not motivated to quit altogether at this time. EF 60%  on 11/14/19 per echo. CAD education folder, with heart heathy diet, warning signs, heart facts, catheterization brochure, and out patient cardiac rehab program provided to [address] on 11/15/19. Educated using teach back method. Reviewed CAD diagnosis definition and purpose of intervention. Discussed risk factors for CAD to include the following: family history, elevated BMI, hyperlipidemia, hypertension, diabetes, and stress. Discussed Heart Healthy/Low Sodium (less than 2000 mg) diet. Pt reports that he eats Cheerios on a daily basis to get fiber in his diet. Reviewed the importance of medication compliance, follow up appointments with cardiologist, signs and symptoms of angina, and what to report to physician after discharge. Emphasized the value of cardiac rehab. Discussed Cardiac Rehab Program format, benefits, and encouraged enrollment to assist with risk modification and management. Pt declined enrollment in the program for the following reasons:  \"I am barely able to walk. I/ve got steel rods in my back, a knee replacement and a bad knee. \"  He reports that he tries to get his exercise doing yardwork and hunting. He also stated that he lives 45 minutes away from [facility] and is not interested in driving back and forth. Eva Pink Sr verbalized understanding with questions answered.   Kassi Corbin, TIFFANIE

## 2019-11-15 NOTE — PROGRESS NOTES
Problem: Falls - Risk of  Goal: *Absence of Falls  Description  Document Ramiro Sanches Fall Risk and appropriate interventions in the flowsheet.   Outcome: Resolved/Met  Note:   Fall Risk Interventions:            Medication Interventions: Teach patient to arise slowly                   Problem: Patient Education: Go to Patient Education Activity  Goal: Patient/Family Education  Outcome: Resolved/Met     Problem: Patient Education: Go to Patient Education Activity  Goal: Patient/Family Education  Outcome: Resolved/Met     Problem: Cath Lab Procedures: Pre-Procedure  Goal: Off Pathway (Use only if patient is Off Pathway)  Outcome: Resolved/Met  Goal: Activity/Safety  Outcome: Resolved/Met  Goal: Consults, if ordered  Outcome: Resolved/Met  Goal: Diagnostic Test/Procedures  Outcome: Resolved/Met  Goal: Nutrition/Diet  Outcome: Resolved/Met  Goal: Discharge Planning  Outcome: Resolved/Met  Goal: Medications  Outcome: Resolved/Met  Goal: Respiratory  Outcome: Resolved/Met  Goal: Treatments/Interventions/Procedures  Outcome: Resolved/Met  Goal: Psychosocial  Outcome: Resolved/Met  Goal: *Verbalize description of procedure  Outcome: Resolved/Met  Goal: *Consent signed  Outcome: Resolved/Met     Problem: Cath Lab Procedures: Post-Cath Day of Procedure (Initiate SCIP Measures for Post-Op Care)  Goal: Off Pathway (Use only if patient is Off Pathway)  Outcome: Resolved/Met  Goal: Activity/Safety  Outcome: Resolved/Met  Goal: Consults, if ordered  Outcome: Resolved/Met  Goal: Diagnostic Test/Procedures  Outcome: Resolved/Met  Goal: Nutrition/Diet  Outcome: Resolved/Met  Goal: Discharge Planning  Outcome: Resolved/Met  Goal: Medications  Outcome: Resolved/Met  Goal: Respiratory  Outcome: Resolved/Met  Goal: Treatments/Interventions/Procedures  Outcome: Resolved/Met  Goal: Psychosocial  Outcome: Resolved/Met  Goal: *Procedure site is without bleeding and signs of infection six hours post sheath removal  Outcome: Resolved/Met  Goal: *Hemodynamically stable  Outcome: Resolved/Met  Goal: *Optimal pain control at patient's stated goal  Outcome: Resolved/Met     Problem: Cath Lab Procedures: Post-Cath Day 1  Goal: Off Pathway (Use only if patient is Off Pathway)  Outcome: Resolved/Met  Goal: Activity/Safety  Outcome: Resolved/Met  Goal: Diagnostic Test/Procedures  Outcome: Resolved/Met  Goal: Nutrition/Diet  Outcome: Resolved/Met  Goal: Discharge Planning  Outcome: Resolved/Met  Goal: Medications  Outcome: Resolved/Met  Goal: Respiratory  Outcome: Resolved/Met  Goal: Treatments/Interventions/Procedures  Outcome: Resolved/Met  Goal: Psychosocial  Outcome: Resolved/Met     Problem: Cath Lab Procedures: Discharge Outcomes  Goal: *Stable cardiac rhythm  Outcome: Resolved/Met  Goal: *Hemodynamically stable  Outcome: Resolved/Met  Goal: *Optimal pain control at patient's stated goal  Outcome: Resolved/Met  Goal: *Pulses palpable, skin color within defined limits, skin temperature warm  Outcome: Resolved/Met  Goal: *Lungs clear or at baseline  Outcome: Resolved/Met  Goal: *Demonstrates ability to perform prescribed activity without shortness of breath or discomfort  Outcome: Resolved/Met  Goal: *Verbalizes home exercise program, activity guidelines, cardiac precautions  Outcome: Resolved/Met  Goal: *Verbalizes understanding and describes prescribed diet  Outcome: Resolved/Met  Goal: *Verbalizes understanding and describes medication purposes and frequencies  Outcome: Resolved/Met  Goal: *Identifies cardiac risk factors  Outcome: Resolved/Met  Goal: *No signs and symptoms of infection or wound complications  Outcome: Resolved/Met  Goal: *Anxiety reduced or absent  Outcome: Resolved/Met  Goal: *Verbalizes and demonstrates incision care  Outcome: Resolved/Met  Goal: *Understands and describes signs and symptoms to report to providers(Stroke Metric)  Outcome: Resolved/Met  Goal: *Describes follow-up/return visits to physicians  Outcome: Resolved/Met  Goal: *Describes available resources and support systems  Outcome: Resolved/Met  Goal: *Influenza immunization  Outcome: Resolved/Met  Goal: *Pneumococcal immunization  Outcome: Resolved/Met

## 2019-11-15 NOTE — PROGRESS NOTES
Problem: Falls - Risk of  Goal: *Absence of Falls  Description  Document Lucía Treviño Fall Risk and appropriate interventions in the flowsheet.   Outcome: Progressing Towards Goal  Note:   Fall Risk Interventions:            Medication Interventions: Teach patient to arise slowly, Patient to call before getting OOB       Problem: Patient Education: Go to Patient Education Activity  Goal: Patient/Family Education  Outcome: Progressing Towards Goal     Problem: Patient Education: Go to Patient Education Activity  Goal: Patient/Family Education  Outcome: Progressing Towards Goal

## 2019-11-15 NOTE — PROGRESS NOTES
PCP JOAN appt scheduled with Dr. Sola Howard on 11/19/2019 at 2:20pm Appt added to AVS. KARMEN Snowden CM Specialist    PC[facility]-48 [facility] after discharge at 9:00am. Appt added to [address] [facility], [facility] Chacorta Cassidy Specialist

## 2019-11-15 NOTE — PROGRESS NOTES
1900: Received report from day shift nurse Mikhail Olea. Reviewed SBAR, Kardex, I/O, MAR, Procedural information and Recent results. VSS, Sinus Faustino (rhythm), RA (oxygenation). A/O X 4  Pt resting in bed/sitting up in the bed. Family at bedside. Cath lab site: RR CDI, no bleeding/hematoma. Pt reporting no CP/SOB. 2100: Spoke with RRT nurse about nasal  ample order. Order held, due to transfer from the CCU.     2300: Pt resting in bed. VSS, Sinus Sofi Curling, pt reports no pain, and no complaints. 0300: VSS, Sinus Faustino/NSR, No complaints. Labs drawn. 0700: Bedside and Verbal shift change report given to day shift nurse Mikhail Olea (oncoming nurse) by Mine Amaro RN (offgoing nurse). Report included the following information: SBAR, Kardex, Intake/Output, MAR, Procedural information, and Recent Results.

## 2019-11-15 NOTE — PROGRESS NOTES
JOAN  Home with [facility]  Follow up visits  Family will provide transportation. Reason for Admission:   NSTEMI                  RRAT Score:     18             Do you (patient/family) have any concerns for transition/discharge? None identified              Plan for utilizing home health:   Pt has qualifying dx for [facility] and agrees to services    Current Advanced Directive/Advance Care Plan:  Not on file            Transition of Care Plan:          Home with [facility]  Follow up visits    CM met with patient to discuss discharge planning. Pt name and  confirmed as pt identifiers. Demographics confirmed. DME - none  ADL's/IADL's - independent pta to include steps and driving  Preferred Rx -- [facility] in [facility] AND [facility] Interventions  PCP Verified by CM: Yes  Mode of Transport at Discharge: Other (see comment)(family)  Transition of Care Consult (CM Consult): Home Health(qualifying dx for [facility] )  [address]: Yes  Discharge Durable Medical Equipment: No  Physical Therapy Consult: No  Occupational Therapy Consult: No  Speech Therapy Consult: No  Current Support Network: Lives with Spouse(1 story, 3 steps entry.   independent with steps pta)  Confirm Follow Up Transport: Family  Plan discussed with Pt/Family/Caregiver: Yes  Discharge Location  Discharge Placement: Home with home health    Marianne Ramirez RN CM  Ext [phone]

## 2019-11-15 NOTE — PROGRESS NOTES
RAPID RESPONSE TEAM- Follow Up    Rounded on patient due to recent transfer out of CCU. Discussed with primary RN, Monica Pearl. No acute concerns, VSS, MEWS 1. No RRT interventions indicated at this time. Please call with any questions or concerns.      Linh Quinn  Rapid Response RN  Amparo Aguayo

## 2019-11-15 NOTE — DISCHARGE INSTRUCTIONS
Smoking Cessation Program: This is a free, phone/text/email based, smoking cessation program. The program is individualized to meet each patient's needs. To enroll use the link - [facility]. [email or web address]/quit or text "[employer] to [phone] from any smart phone.

## 2019-11-20 ENCOUNTER — HOME CARE VISIT (OUTPATIENT)
Dept: SCHEDULING | Facility: HOME HEALTH | Age: 69
End: 2019-11-20

## 2019-11-20 PROCEDURE — G0299 HHS/HOSPICE OF RN EA 15 MIN: HCPCS

## 2019-11-25 ENCOUNTER — TELEPHONE (OUTPATIENT)
Dept: CARDIOLOGY CLINIC | Age: 69
End: 2019-11-25

## 2019-11-25 RX ORDER — CLOPIDOGREL BISULFATE 75 MG/1
75 TABLET ORAL DAILY
Qty: 90 TAB | Refills: 3 | Status: SHIPPED | OUTPATIENT
Start: 2019-11-25 | End: 2020-11-20

## 2019-11-26 ENCOUNTER — OFFICE VISIT (OUTPATIENT)
Dept: CARDIOLOGY CLINIC | Age: 69
End: 2019-11-26

## 2019-11-26 VITALS
BODY MASS INDEX: 32.95 KG/M2 | HEIGHT: 75 IN | RESPIRATION RATE: 18 BRPM | WEIGHT: 265 LBS | OXYGEN SATURATION: 94 % | DIASTOLIC BLOOD PRESSURE: 72 MMHG | SYSTOLIC BLOOD PRESSURE: 100 MMHG | HEART RATE: 61 BPM

## 2019-11-26 DIAGNOSIS — I25.110 CORONARY ARTERY DISEASE INVOLVING NATIVE CORONARY ARTERY OF NATIVE HEART WITH UNSTABLE ANGINA PECTORIS (HCC): Primary | ICD-10-CM

## 2019-11-26 DIAGNOSIS — E78.2 MIXED HYPERLIPIDEMIA: ICD-10-CM

## 2019-11-26 DIAGNOSIS — I25.10 ASHD (ARTERIOSCLEROTIC HEART DISEASE): ICD-10-CM

## 2019-11-26 RX ORDER — GABAPENTIN 300 MG/1
200 CAPSULE ORAL DAILY
COMMUNITY

## 2019-11-26 RX ORDER — OXYCODONE AND ACETAMINOPHEN 5; 325 MG/1; MG/1
1 TABLET ORAL
COMMUNITY
Start: 2019-11-04

## 2019-11-26 NOTE — PROGRESS NOTES
[address] [place] [address] [address], [place], [address] [facility]     Zofia Hollis is a 68 y.o. male. Last seen by me 6 weeks ago. Subjective:     Geneva Althea Sr reports random intermittent chest pain at rest lasting several minutes. Patient denies any exertional chest pain, dyspnea, palpitations, syncope, orthopnea, edema or paroxysmal nocturnal dyspnea. Patient Active Problem List    Diagnosis Date Noted    Tobacco abuse 11/14/2019    NSTEMI (non-ST elevated myocardial infarction) ([address] 11/13/2019    SOB (shortness of breath) on exertion 12/28/2018    Coronary artery disease involving native coronary artery of native heart with unstable angina pectoris ([address] 12/28/2018    Status post cardiac catheterization 12/28/2018    ASHD (arteriosclerotic heart disease) 12/28/2018    Mixed hyperlipidemia 12/28/2018    ACS (acute coronary syndrome) ([address] 11/29/2018      Jaycob Medina MD  Past Medical History:   Diagnosis Date    CAD (coronary artery disease)     Cancer ([address]     bladder caner    Gastrointestinal disorder     GERD    Hypertension     Stroke ([address]     x's 2    Tobacco abuse 11/14/2019      Past Surgical History:   Procedure Laterality Date    ABDOMEN SURGERY PROC UNLISTED      Spleenectomy    CARDIAC SURG PROCEDURE UNLIST      Cardiac stent     HX ORTHOPAEDIC      Left toe surgery great toe    HX ORTHOPAEDIC      Left knee replacement    HX ORTHOPAEDIC      carpal tunnel left hand    HX ORTHOPAEDIC      back surgery    HX OTHER SURGICAL      bladder surgery for bladder cancer. No Known Allergies   History reviewed. No pertinent family history.    Social History     Socioeconomic History    Marital status:      Spouse name: Not on file    Number of children: Not on file    Years of education: Not on file    Highest education level: Not on file   Occupational History    Not on file   Social Needs    Financial resource strain: Not on file    Food insecurity: Worry: Not on file     Inability: Not on file    Transportation needs:     Medical: Not on file     Non-medical: Not on file   Tobacco Use    Smoking status: Current Every Day Smoker     Packs/day: 0.50     Years: 57.00     Pack years: 28.50     Types: Cigarettes    Smokeless tobacco: Never Used   Substance and Sexual Activity    Alcohol use: No    Drug use: No    Sexual activity: Not on file   Lifestyle    Physical activity:     Days per week: Not on file     Minutes per session: Not on file    Stress: Not on file   Relationships    Social connections:     Talks on phone: Not on file     Gets together: Not on file     Attends Jain service: Not on file     Active member of club or organization: Not on file     Attends meetings of clubs or organizations: Not on file     Relationship status: Not on file    Intimate partner violence:     Fear of current or ex partner: Not on file     Emotionally abused: Not on file     Physically abused: Not on file     Forced sexual activity: Not on file   Other Topics Concern    Not on file   Social History Narrative    Not on file      Current Outpatient Medications   Medication Sig    oxyCODONE-acetaminophen (PERCOCET) 5-325 mg per tablet     gabapentin (NEURONTIN) 300 mg capsule Take 300 mg by mouth daily.  clopidogrel (PLAVIX) 75 mg tab Take 1 Tab by mouth daily.  nitroglycerin (NITROSTAT) 0.4 mg SL tablet 1 Tab by SubLINGual route as needed for Chest Pain. Up to 3 doses.  aspirin delayed-release 81 mg tablet Take 1 Tab by mouth daily.  metoprolol succinate (TOPROL XL) 100 mg tablet Take 25 mg by mouth daily. Patient reports that he splits this into quarters to save money and takes 25 mg daily    hydroxychloroquine (PLAQUENIL) 200 mg tablet Take 200 mg by mouth two (2) times a day.  ranolazine ER (RANEXA) 1,000 mg Take 1 Tab by mouth two (2) times a day.  allopurinol (ZYLOPRIM) 100 mg tablet Take 50 mg by mouth daily.     fluticasone propionate (FLONASE) 50 mcg/actuation nasal spray 2 Sprays by Both Nostrils route daily as needed.  atorvastatin (LIPITOR) 20 mg tablet Take 20 mg by mouth nightly.  multivitamin (ONE A DAY) tablet Take 1 Tab by mouth daily.  omeprazole (PRILOSEC) 10 mg capsule Take 20 mg by mouth daily.  escitalopram oxalate (LEXAPRO) 10 mg tablet Take 10 mg by mouth daily. No current facility-administered medications for this visit. Review of Systems  Constitutional: Negative for fever, chills, malaise/fatigue and diaphoresis. Respiratory: Negative for cough, hemoptysis, sputum production, shortness of breath and wheezing. Cardiovascular: Negative for palpitations, orthopnea, claudication, leg swelling and PND. +chest pain  Gastrointestinal: Negative for heartburn, nausea, vomiting, blood in stool and melena. Genitourinary: Negative for dysuria and flank pain. Musculoskeletal: Negative for joint pain and back pain. Skin: Negative for rash. Neurological: Negative for focal weakness, seizures, loss of consciousness, weakness and headaches. Endo/Heme/Allergies: Does not bruise/bleed easily. Psychiatric/Behavioral: Negative for memory loss. The patient does not have insomnia. Physical Exam:    Visit Vitals  /72   Pulse 61   Resp 18   Ht 6' 3\" (1.905 m)   Wt 265 lb (120.2 kg)   SpO2 94%   BMI 33.12 kg/m²     Wt Readings from Last 3 Encounters:   11/26/19 265 lb (120.2 kg)   11/14/19 264 lb 15.9 oz (120.2 kg)   10/28/19 271 lb (122.9 kg)       Gen: NAD    Mental Status - Alert. General Appearance - Not in acute distress. Neck - no JVD    Chest and Lung Exam   Inspection: Accessory muscles - No use of accessory muscles in breathing. Auscultation:   Breath sounds: - Normal.     Cardiovascular   Inspection: Jugular vein - Bilateral - Inspection Normal.   Palpation/Percussion:   Apical Impulse: - Normal.   Auscultation: Rhythm - Regular. Heart Sounds - S1 WNL and S2 WNL. No S3 or S4. Murmurs & Other Heart Sounds: Auscultation of the heart reveals - No Murmurs. Peripheral Vascular   Upper Extremity: Inspection - Bilateral - No Cyanotic nailbeds or Digital clubbing. Lower Extremity:   Palpation: Edema - Bilateral - No edema. Abdomen: Soft, non-tender, bowel sounds are active. Neuro: A&O times 3, CN and motor grossly WNL    Cardiographics  EKG 11/26/19 - NSR, minor nonspecific ST-T changes     Assessment:     Encounter Diagnoses   Name Primary?  Coronary artery disease involving native coronary artery of native heart with unstable angina pectoris ([facility] Yes    ASHD (arteriosclerotic heart disease)     Mixed hyperlipidemia           Plan:       1. Atherosclerotic heart disease: LAD lesion with aneurysm, no significant dyspnea on exertion or chest pain continue Ranexa and metoprolol. On ASA/plavix    2. Hypertension: Controlled 130/76  3.   Hyperlipidemia: LDL 66 continue statin therapy    Recent echo and EKG are OK    Written by Juan Pop, as dictated by Ashley Brown MD.      '

## 2019-11-26 NOTE — LETTER
11/26/19 Patient: Echo Turcios Sr  
YOB: 1950 Date of Visit: 11/26/2019 Kei Harmon MD 
[address] Floor [place] [id number] VIA Facsimile: [phone] Dear Kei Harmon MD, Thank you for referring Mr. Kenyatta Ibarra Sr to [address] [place] [address] for evaluation. My notes for this consultation are attached. If you have questions, please do not hesitate to call me. I look forward to following your patient along with you.  
 
 
Sincerely, 
 
Tatum Lopez MD

## 2019-11-26 NOTE — PROGRESS NOTES
1. Have you been to the ER, urgent care clinic since your last visit? Hospitalized since your last visit? SEEN ON 11/13/19. Have you seen or consulted any other health care providers outside of the [address] [place] [address] since your last visit? Include any pap smears or colon screening. NO.         Chief Complaint   Patient presents with   [facility]     CHEST PAIN  DIFFERENT FROM WHAT HE HAD

## 2020-07-21 ENCOUNTER — OFFICE VISIT (OUTPATIENT)
Dept: CARDIOLOGY CLINIC | Age: 70
End: 2020-07-21

## 2020-07-21 VITALS
HEART RATE: 68 BPM | SYSTOLIC BLOOD PRESSURE: 102 MMHG | WEIGHT: 266 LBS | OXYGEN SATURATION: 94 % | BODY MASS INDEX: 33.07 KG/M2 | RESPIRATION RATE: 18 BRPM | DIASTOLIC BLOOD PRESSURE: 64 MMHG | HEIGHT: 75 IN

## 2020-07-21 DIAGNOSIS — Z72.0 TOBACCO ABUSE: ICD-10-CM

## 2020-07-21 DIAGNOSIS — I25.10 ASHD (ARTERIOSCLEROTIC HEART DISEASE): Primary | ICD-10-CM

## 2020-07-21 DIAGNOSIS — E78.2 MIXED HYPERLIPIDEMIA: ICD-10-CM

## 2020-07-21 RX ORDER — ERYTHROMYCIN 5 MG/G
OINTMENT OPHTHALMIC
COMMUNITY
Start: 2020-07-11 | End: 2021-01-19 | Stop reason: ALTCHOICE

## 2020-07-21 NOTE — PROGRESS NOTES
1. Have you been to the ER, urgent care clinic since your last visit? Hospitalized since your last visit? Seen at [facility] for eye problem. 2. Have you seen or consulted any other health care providers outside of the [address] [place] [address] since your last visit? Include any pap smears or colon screening. Seen at [facility] for eye infections.     Chief Complaint   Patient presents with    Follow-up     6 month- pt denies any cardiac symptoms

## 2020-07-21 NOTE — PROGRESS NOTES
[address], [place], [address] [facility]        Subjective:     Marti Edmonds is here today for a f/u appt. Patient denies any exertional chest pain, dyspnea, palpitations, syncope, orthopnea, edema or paroxysmal nocturnal dyspnea. He has been doing well. He has not been on Ranexa for some time. He thought he was taken off it d/t being placed on Plavix. No CP off med. Patient Active Problem List    Diagnosis Date Noted    Tobacco abuse 11/14/2019    NSTEMI (non-ST elevated myocardial infarction) ([address] 11/13/2019    SOB (shortness of breath) on exertion 12/28/2018    Coronary artery disease involving native coronary artery of native heart with unstable angina pectoris ([address] 12/28/2018    Status post cardiac catheterization 12/28/2018    ASHD (arteriosclerotic heart disease) 12/28/2018    Mixed hyperlipidemia 12/28/2018    ACS (acute coronary syndrome) ([address] 11/29/2018      Juana Ni MD  Past Medical History:   Diagnosis Date    CAD (coronary artery disease)     Cancer ([address]     bladder caner    Gastrointestinal disorder     GERD    Hypertension     Stroke ([address]     x's 2    Tobacco abuse 11/14/2019      Past Surgical History:   Procedure Laterality Date    ABDOMEN SURGERY PROC UNLISTED      Spleenectomy    CARDIAC SURG PROCEDURE UNLIST      Cardiac stent     HX ORTHOPAEDIC      Left toe surgery great toe    HX ORTHOPAEDIC      Left knee replacement    HX ORTHOPAEDIC      carpal tunnel left hand    HX ORTHOPAEDIC      back surgery    HX OTHER SURGICAL      bladder surgery for bladder cancer. No Known Allergies   No family history on file.    Social History     Socioeconomic History    Marital status:      Spouse name: Not on file    Number of children: Not on file    Years of education: Not on file    Highest education level: Not on file   Occupational History    Not on file   Social Needs    Financial resource strain: Not on file    Food insecurity Worry: Not on file     Inability: Not on file    Transportation needs     Medical: Not on file     Non-medical: Not on file   Tobacco Use    Smoking status: Current Every Day Smoker     Packs/day: 0.50     Years: 57.00     Pack years: 28.50     Types: Cigarettes    Smokeless tobacco: Never Used   Substance and Sexual Activity    Alcohol use: No    Drug use: No    Sexual activity: Not on file   Lifestyle    Physical activity     Days per week: Not on file     Minutes per session: Not on file    Stress: Not on file   Relationships    Social connections     Talks on phone: Not on file     Gets together: Not on file     Attends Sikhism service: Not on file     Active member of club or organization: Not on file     Attends meetings of clubs or organizations: Not on file     Relationship status: Not on file    Intimate partner violence     Fear of current or ex partner: Not on file     Emotionally abused: Not on file     Physically abused: Not on file     Forced sexual activity: Not on file   Other Topics Concern    Not on file   Social History Narrative    Not on file      Current Outpatient Medications   Medication Sig    erythromycin (ILOTYCIN) ophthalmic ointment INTILL 0.5 INCH STIRP IN BOTH EYES QID FOR 5 DAYS.  testosterone cypionate (DEPO-TESTOSTERONE) 100 mg/mL injection 100 mg every fourteen (14) days.  oxyCODONE-acetaminophen (PERCOCET) 5-325 mg per tablet Take 1 Tab by mouth every eight (8) hours as needed.  gabapentin (NEURONTIN) 300 mg capsule Take 200 mg by mouth daily.  clopidogrel (PLAVIX) 75 mg tab Take 1 Tab by mouth daily.  nitroglycerin (NITROSTAT) 0.4 mg SL tablet 1 Tab by SubLINGual route as needed for Chest Pain. Up to 3 doses.  aspirin delayed-release 81 mg tablet Take 1 Tab by mouth daily.  metoprolol succinate (TOPROL XL) 100 mg tablet Take 25 mg by mouth daily.  Patient reports that he splits this into quarters to save money and takes 25 mg daily    allopurinol (ZYLOPRIM) 100 mg tablet Take 100 mg by mouth daily.  fluticasone propionate (FLONASE) 50 mcg/actuation nasal spray 2 Sprays by Both Nostrils route daily as needed.  atorvastatin (LIPITOR) 20 mg tablet Take 20 mg by mouth nightly.  multivitamin (ONE A DAY) tablet Take 1 Tab by mouth daily.  omeprazole (PRILOSEC) 10 mg capsule Take 20 mg by mouth daily.  hydroxychloroquine (PLAQUENIL) 200 mg tablet Take 200 mg by mouth two (2) times a day.  escitalopram oxalate (LEXAPRO) 10 mg tablet Take 10 mg by mouth daily. No current facility-administered medications for this visit. Review of Systems  Constitutional: Negative for fever, chills, malaise/fatigue and diaphoresis. Respiratory: Negative for cough, hemoptysis, sputum production, shortness of breath and wheezing. Cardiovascular: Negative for chest pain, palpitations, orthopnea, claudication, leg swelling and PND. Gastrointestinal: Negative for heartburn, nausea, vomiting, blood in stool and melena. Genitourinary: Negative for dysuria and flank pain. Musculoskeletal: Negative for joint pain and back pain. Skin: Negative for rash. Neurological: Negative for focal weakness, seizures, loss of consciousness, weakness and headaches. Endo/Heme/Allergies: Does not bruise/bleed easily. Psychiatric/Behavioral: Negative for memory loss. The patient does not have insomnia. Physical Exam:    Visit Vitals  /64 (BP 1 Location: Left arm, BP Patient Position: Sitting)   Pulse 68   Resp 18   Ht 6' 3\" (1.905 m)   Wt 266 lb (120.7 kg)   SpO2 94%   BMI 33.25 kg/m²     Wt Readings from Last 3 Encounters:   07/21/20 266 lb (120.7 kg)   11/26/19 265 lb (120.2 kg)   11/14/19 264 lb 15.9 oz (120.2 kg)       Gen: NAD    Mental Status - Alert. General Appearance - Not in acute distress. Neck - no JVD    Chest and Lung Exam   Inspection: Accessory muscles - No use of accessory muscles in breathing.    Auscultation:   Breath sounds: - Normal.     Cardiovascular   Inspection: Jugular vein - Bilateral - Inspection Normal.   Palpation/Percussion:   Apical Impulse: - Normal.   Auscultation: Rhythm - Regular. Heart Sounds - S1 WNL and S2 WNL. No S3 or S4. Murmurs & Other Heart Sounds: Auscultation of the heart reveals - No Murmurs. Peripheral Vascular   Upper Extremity: Inspection - Bilateral - No Cyanotic nailbeds or Digital clubbing. Lower Extremity:   Palpation: Edema - Bilateral - No edema. Abdomen: Soft, non-tender, bowel sounds are active. Neuro: A&O times 3, CN and motor grossly WNL    Cardiographics  EKG - SB, minor nonspecific ST-T changes     Assessment:     Encounter Diagnoses   Name Primary?  ASHD (arteriosclerotic heart disease) Yes    Mixed hyperlipidemia     Tobacco abuse           Plan:       1. Atherosclerotic heart disease: LAD lesion with aneurysm, no anginal or anginal equivalent symptoms today of concern. He has not been on Ranexa. Will not restart it at this time. Continue Metoprolol, ASA, and Plavix    2. Hypertension: Controlled at 102/64  3. Hyperlipidemia: LDL 66 7/19. Labs due, will order now.  continue statin therapy    F/U in 6 months    Ada Arredondo MD          '

## 2020-11-20 RX ORDER — CLOPIDOGREL BISULFATE 75 MG/1
TABLET ORAL
Qty: 90 TAB | Refills: 3 | Status: SHIPPED | OUTPATIENT
Start: 2020-11-20 | End: 2021-01-19 | Stop reason: ALTCHOICE

## 2021-01-19 ENCOUNTER — OFFICE VISIT (OUTPATIENT)
Dept: CARDIOLOGY CLINIC | Age: 71
End: 2021-01-19
Payer: MEDICARE

## 2021-01-19 VITALS
WEIGHT: 260 LBS | BODY MASS INDEX: 32.33 KG/M2 | RESPIRATION RATE: 18 BRPM | HEART RATE: 63 BPM | DIASTOLIC BLOOD PRESSURE: 70 MMHG | SYSTOLIC BLOOD PRESSURE: 108 MMHG | HEIGHT: 75 IN | OXYGEN SATURATION: 97 %

## 2021-01-19 DIAGNOSIS — R06.02 SOB (SHORTNESS OF BREATH) ON EXERTION: ICD-10-CM

## 2021-01-19 DIAGNOSIS — I25.10 CORONARY ARTERY DISEASE INVOLVING NATIVE CORONARY ARTERY OF NATIVE HEART WITHOUT ANGINA PECTORIS: Primary | ICD-10-CM

## 2021-01-19 DIAGNOSIS — E78.2 MIXED HYPERLIPIDEMIA: ICD-10-CM

## 2021-01-19 DIAGNOSIS — Z72.0 TOBACCO ABUSE: ICD-10-CM

## 2021-01-19 PROCEDURE — G8427 DOCREV CUR MEDS BY ELIG CLIN: HCPCS | Performed by: INTERNAL MEDICINE

## 2021-01-19 PROCEDURE — 3017F COLORECTAL CA SCREEN DOC REV: CPT | Performed by: INTERNAL MEDICINE

## 2021-01-19 PROCEDURE — G8419 CALC BMI OUT NRM PARAM NOF/U: HCPCS | Performed by: INTERNAL MEDICINE

## 2021-01-19 PROCEDURE — 1101F PT FALLS ASSESS-DOCD LE1/YR: CPT | Performed by: INTERNAL MEDICINE

## 2021-01-19 PROCEDURE — G8510 SCR DEP NEG, NO PLAN REQD: HCPCS | Performed by: INTERNAL MEDICINE

## 2021-01-19 PROCEDURE — 99214 OFFICE O/P EST MOD 30 MIN: CPT | Performed by: INTERNAL MEDICINE

## 2021-01-19 PROCEDURE — G8536 NO DOC ELDER MAL SCRN: HCPCS | Performed by: INTERNAL MEDICINE

## 2021-01-19 PROCEDURE — 93000 ELECTROCARDIOGRAM COMPLETE: CPT | Performed by: INTERNAL MEDICINE

## 2021-01-19 NOTE — PROGRESS NOTES
Ector Luu MD          NAME:  Treasure Sever Sr   :   1950   MRN:   [id number]   PCP:  Sabina Leigh MD           Subjective: The patient is a 65y.o. year old male  who returns for a routine follow-up. Since the last visit, patient reports no change in exercise tolerance, chest pain, edema, medication intolerance, palpitations, shortness of breath, PND/orthopnea wheezing, sputum, syncope, dizziness or light headedness. Doing well. Past Medical History:   Diagnosis Date    CAD (coronary artery disease)     Cancer ([facility]     bladder caner    Gastrointestinal disorder     GERD    Hypertension     Stroke ([facility]     x's 2    Tobacco abuse 2019        ICD-10-CM ICD-9-CM    1. Coronary artery disease involving native coronary artery of native heart without angina pectoris  I25.10 414.01 AMB POC EKG ROUTINE W/ 12 LEADS, INTER & REP   2. SOB (shortness of breath) on exertion  R06.02 786.05 AMB POC EKG ROUTINE W/ 12 LEADS, INTER & REP   3. Mixed hyperlipidemia  E78.2 272.2    4. Tobacco abuse  Z72.0 305.1       Social History     Tobacco Use    Smoking status: Current Every Day Smoker     Packs/day: 0.50     Years: 57.00     Pack years: 28.50     Types: Cigarettes    Smokeless tobacco: Never Used    Tobacco comment: 3/4 OF A PACK A DAY   Substance Use Topics    Alcohol use: No      No family history on file. Review of Systems  Cardiovascular: Negative except as noted in HPI      Objective:       Vitals:    21 1023   BP: 108/70   Pulse: 63   Resp: 18   SpO2: 97%   Weight: 260 lb (117.9 kg)   Height: 6' 3\" (1.905 m)    Body mass index is 32.5 kg/m². General PE  Mental Status - Alert. General Appearance - Not in acute distress. Chest and Lung Exam   Inspection: Accessory muscles - No use of accessory muscles in breathing.   Auscultation:   Breath sounds: - Normal.    Cardiovascular   Inspection: Jugular vein - Bilateral - Inspection Normal.  Palpation/Percussion:   Apical Impulse: - Normal.  Auscultation: Rhythm - Regular. Heart Sounds - S1 WNL and S2 WNL. No S3 or S4. Murmurs & Other Heart Sounds: Auscultation of the heart reveals - No Murmurs. Peripheral Vascular   Upper Extremity: Inspection - Bilateral - No Cyanotic nailbeds or Digital clubbing. Lower Extremity:   Palpation: Edema - Bilateral - No edema. Data Review:     EKG -  EKG: normal EKG, normal sinus rhythm, unchanged from previous tracings. LABS- @brieflabs@      Allergies reviewed  No Known Allergies    Medications reviewed  Current Outpatient Medications   Medication Sig    testosterone cypionate (DEPO-TESTOSTERONE) 100 mg/mL injection 100 mg every fourteen (14) days.  oxyCODONE-acetaminophen (PERCOCET) 5-325 mg per tablet Take 1 Tab by mouth every eight (8) hours as needed.  gabapentin (NEURONTIN) 300 mg capsule Take 200 mg by mouth daily.  nitroglycerin (NITROSTAT) 0.4 mg SL tablet 1 Tab by SubLINGual route as needed for Chest Pain. Up to 3 doses.  aspirin delayed-release 81 mg tablet Take 1 Tab by mouth daily.  metoprolol succinate (TOPROL XL) 100 mg tablet Take 25 mg by mouth daily. Patient reports that he splits this into quarters to save money and takes 25 mg daily    hydroxychloroquine (PLAQUENIL) 200 mg tablet Take 200 mg by mouth two (2) times a day.  allopurinol (ZYLOPRIM) 100 mg tablet Take 100 mg by mouth daily.  fluticasone propionate (FLONASE) 50 mcg/actuation nasal spray 2 Sprays by Both Nostrils route daily as needed.  atorvastatin (LIPITOR) 20 mg tablet Take 20 mg by mouth nightly.  multivitamin (ONE A DAY) tablet Take 1 Tab by mouth daily.  omeprazole (PRILOSEC) 10 mg capsule Take 20 mg by mouth daily.  escitalopram oxalate (LEXAPRO) 10 mg tablet Take 10 mg by mouth daily. No current facility-administered medications for this visit. Assessment:       ICD-10-CM ICD-9-CM    1.  Coronary artery disease involving native coronary artery of native heart without angina pectoris  I25.10 414.01 AMB POC EKG ROUTINE W/ 12 LEADS, INTER & REP   2. SOB (shortness of breath) on exertion  R06.02 786.05 AMB POC EKG ROUTINE W/ 12 LEADS, INTER & REP   3. Mixed hyperlipidemia  E78.2 272.2    4. Tobacco abuse  Z72.0 305.1         Orders Placed This Encounter    AMB POC EKG ROUTINE W/ 12 LEADS, INTER & REP     Order Specific Question:   Reason for Exam:     Answer:   ROUTINE       Plan:     Asymptomatic. BP at target. EKG fine. Lipids per PCP. Stop plavix 14 mo post PCI.   Continue ASA 81,  F/U 6 mo    Hoa Han MD

## 2021-01-19 NOTE — LETTER
1/19/2021 Patient: aDyan Mcghee Sr  
YOB: 1950 Date of Visit: 1/19/2021 Natalee Jimenez MD 
[address] [address] Via Fax: [phone] Dear Natalee Jimenez MD, Thank you for referring Mr. Fawad Mckoy Sr to [address] [place] [address] for evaluation. My notes for this consultation are attached. If you have questions, please do not hesitate to call me. I look forward to following your patient along with you.  
 
 
Sincerely, 
 
Deya Thomason MD

## 2021-01-19 NOTE — PROGRESS NOTES
1. Have you been to the ER, urgent care clinic since your last visit? Hospitalized since your last visit? NO.    2. Have you seen or consulted any other health care providers outside of the [address] [place] [address] since your last visit? Include any pap smears or colon screening. NO.       Chief Complaint   Patient presents with    Follow-up     6 MONTH- SOB

## 2021-07-20 ENCOUNTER — OFFICE VISIT (OUTPATIENT)
Dept: CARDIOLOGY CLINIC | Age: 71
End: 2021-07-20
Payer: MEDICARE

## 2021-07-20 VITALS
DIASTOLIC BLOOD PRESSURE: 74 MMHG | RESPIRATION RATE: 18 BRPM | HEART RATE: 59 BPM | WEIGHT: 258 LBS | BODY MASS INDEX: 32.08 KG/M2 | OXYGEN SATURATION: 96 % | HEIGHT: 75 IN | SYSTOLIC BLOOD PRESSURE: 120 MMHG

## 2021-07-20 DIAGNOSIS — I25.10 CORONARY ARTERY DISEASE INVOLVING NATIVE CORONARY ARTERY OF NATIVE HEART WITHOUT ANGINA PECTORIS: Primary | ICD-10-CM

## 2021-07-20 DIAGNOSIS — E78.2 MIXED HYPERLIPIDEMIA: ICD-10-CM

## 2021-07-20 PROCEDURE — G8417 CALC BMI ABV UP PARAM F/U: HCPCS | Performed by: INTERNAL MEDICINE

## 2021-07-20 PROCEDURE — G8536 NO DOC ELDER MAL SCRN: HCPCS | Performed by: INTERNAL MEDICINE

## 2021-07-20 PROCEDURE — 3017F COLORECTAL CA SCREEN DOC REV: CPT | Performed by: INTERNAL MEDICINE

## 2021-07-20 PROCEDURE — G8427 DOCREV CUR MEDS BY ELIG CLIN: HCPCS | Performed by: INTERNAL MEDICINE

## 2021-07-20 PROCEDURE — 99214 OFFICE O/P EST MOD 30 MIN: CPT | Performed by: INTERNAL MEDICINE

## 2021-07-20 PROCEDURE — 1101F PT FALLS ASSESS-DOCD LE1/YR: CPT | Performed by: INTERNAL MEDICINE

## 2021-07-20 PROCEDURE — G8432 DEP SCR NOT DOC, RNG: HCPCS | Performed by: INTERNAL MEDICINE

## 2021-07-20 NOTE — PROGRESS NOTES
Toi Najjar, MD          NAME:  Denis Read Sr   :   1950   MRN:   [id number]   PCP:  Delores Villarreal MD           Subjective: The patient is a 65y.o. year old male  who returns for a routine follow-up. Since the last visit, patient reports no change in exercise tolerance, chest pain, edema, medication intolerance, palpitations, shortness of breath, PND/orthopnea wheezing, sputum, syncope, dizziness or light headedness. Doing well. Past Medical History:   Diagnosis Date    CAD (coronary artery disease)     Cancer ([facility]     bladder caner    Gastrointestinal disorder     GERD    Hyperlipidemia     Hypertension     Myocardial infarction ([facility]     Stroke ([facility]     x's 2    Tobacco abuse 2019        ICD-10-CM ICD-9-CM    1. Coronary artery disease involving native coronary artery of native heart without angina pectoris  I25.10 414.01    2. Mixed hyperlipidemia  E78.2 272.2       Social History     Tobacco Use    Smoking status: Current Every Day Smoker     Packs/day: 1.00     Years: 57.00     Pack years: 57.00     Types: Cigarettes    Smokeless tobacco: Never Used   Substance Use Topics    Alcohol use: No      History reviewed. No pertinent family history. Review of Systems  Cardiovascular: Negative except as noted in HPI      Objective:       Vitals:    21 1109   BP: 120/74   Pulse: (!) 59   Resp: 18   SpO2: 96%   Weight: 258 lb (117 kg)   Height: 6' 3\" (1.905 m)    Body mass index is 32.25 kg/m². General PE  Mental Status - Alert. General Appearance - Not in acute distress. Chest and Lung Exam   Inspection: Accessory muscles - No use of accessory muscles in breathing. Auscultation:   Breath sounds: - Normal.    Cardiovascular   Inspection: Jugular vein - Bilateral - Inspection Normal.  Palpation/Percussion:   Apical Impulse: - Normal.  Auscultation: Rhythm - Regular. Heart Sounds - S1 WNL and S2 WNL. No S3 or S4. Murmurs & Other Heart Sounds:  Auscultation of the heart reveals - No Murmurs. Peripheral Vascular   Upper Extremity: Inspection - Bilateral - No Cyanotic nailbeds or Digital clubbing. Lower Extremity:   Palpation: Edema - Bilateral - No edema. Data Review:     EKG -  EKG: . LABS- @brieflabs@      Allergies reviewed  No Known Allergies    Medications reviewed  Current Outpatient Medications   Medication Sig    testosterone cypionate (DEPO-TESTOSTERONE) 100 mg/mL injection 100 mg every fourteen (14) days.  oxyCODONE-acetaminophen (PERCOCET) 5-325 mg per tablet Take 1 Tab by mouth every eight (8) hours as needed.  gabapentin (NEURONTIN) 300 mg capsule Take 200 mg by mouth daily.  nitroglycerin (NITROSTAT) 0.4 mg SL tablet 1 Tab by SubLINGual route as needed for Chest Pain. Up to 3 doses.  aspirin delayed-release 81 mg tablet Take 1 Tab by mouth daily.  metoprolol succinate (TOPROL XL) 100 mg tablet Take 25 mg by mouth daily. Patient reports that he splits this into quarters to save money and takes 25 mg daily    allopurinol (ZYLOPRIM) 100 mg tablet Take 100 mg by mouth two (2) times a day.  fluticasone propionate (FLONASE) 50 mcg/actuation nasal spray 2 Sprays by Both Nostrils route daily as needed.  atorvastatin (LIPITOR) 20 mg tablet Take 20 mg by mouth nightly.  multivitamin (ONE A DAY) tablet Take 1 Tab by mouth daily.  omeprazole (PRILOSEC) 10 mg capsule Take 20 mg by mouth daily.  hydroxychloroquine (PLAQUENIL) 200 mg tablet Take 200 mg by mouth two (2) times a day. (Patient not taking: Reported on 7/20/2021)    escitalopram oxalate (LEXAPRO) 10 mg tablet Take 10 mg by mouth daily. (Patient not taking: Reported on 7/20/2021)     No current facility-administered medications for this visit. Assessment:       ICD-10-CM ICD-9-CM    1. Coronary artery disease involving native coronary artery of native heart without angina pectoris  I25.10 414.01    2.  Mixed hyperlipidemia  E78.2 272.2         No orders of the defined types were placed in this encounter. Plan:     Asymptomatic 2 years after 2 vessel PCI. Continue ASA, metoprolol, atorvastatin.   F/U 6 mo    Renu Miguel MD

## 2021-07-20 NOTE — LETTER
7/20/2021    Patient: Ronny Vargas   YOB: 1950   Date of Visit: 7/20/2021     Johanny Mcknight MD  [address] [address]  Via Fax: [phone]    Dear Johanny Mcknight MD,      Thank you for referring Mr. Poli Harris to [address] [place] [address] for evaluation. My notes for this consultation are attached. If you have questions, please do not hesitate to call me. I look forward to following your patient along with you.       Sincerely,    Rodney Bower MD

## 2021-07-20 NOTE — PROGRESS NOTES
1. Have you been to the ER, urgent care clinic since your last visit? Hospitalized since your last visit? No.    2. Have you seen or consulted any other health care providers outside of the [address] [place] [address] since your last visit? Include any pap smears or colon screening.    No.        Chief Complaint   Patient presents with    Coronary Artery Disease    Cholesterol Problem     6 month- pt denies any cardiac symptoms

## 2021-11-05 NOTE — PROGRESS NOTES
Reason for Admission: ACS (acute coronary syndrome) ([facility] RRAT Score:   5 Plan for utilizing home health:   Pt states he does not think he will need services upon discharge. Will follow progress to assess for need. Likelihood of Readmission:   Low Transition of Care Plan:   Pt is aware of cardiac procedure plans. He anticipates returning home when stable for discharge. Care Management Interventions PCP Verified by CM: Yes 
Palliative Care Criteria Met (RRAT>21 & CHF Dx)?: No 
Mode of Transport at Discharge: Other (see comment)(Wife in private vehicle.) Transition of Care Consult (CM Consult): Discharge Planning Current Support Network: Lives with Spouse Confirm Follow Up Transport: Family Plan discussed with Pt/Family/Caregiver: Yes Discharge Location Discharge Placement: Home with one level Care management identifies no immediate discharge needs at this time. Plan to follow for relevant support and intervention. Javy Aguiar MSW, LSW Care Manager No

## 2021-11-15 ENCOUNTER — TELEPHONE (OUTPATIENT)
Dept: CARDIOLOGY CLINIC | Age: 71
End: 2021-11-15

## 2021-11-15 DIAGNOSIS — E78.2 MIXED HYPERLIPIDEMIA: ICD-10-CM

## 2021-11-15 DIAGNOSIS — I25.10 CORONARY ARTERY DISEASE INVOLVING NATIVE CORONARY ARTERY OF NATIVE HEART WITHOUT ANGINA PECTORIS: Primary | ICD-10-CM

## 2021-11-15 NOTE — TELEPHONE ENCOUNTER
[facility] for Franny Ferguson NP with [facility] (unable to read last name).  # [phone], fax # [phone]. Verified patient with two patient identifiers. Asking if pt is cleared for Rt reverse total shoulder arthroplasty under general on 12-6-2021. Need name of surgeon performing the surgery. Tried to reach by phone, rings multiple time, unable to leave message. And can pt hold ASA how many days prior, resume when. Also wants notes, cath report, tests, etc.    Need name of surgeon prior to requesting preop clearance.

## 2021-11-18 NOTE — TELEPHONE ENCOUNTER
Faxed this note, last OV, cath report, EKG to Yakov Suazo with [facility], ph # [phone], fax # [phone].

## 2021-11-18 NOTE — TELEPHONE ENCOUNTER
Is pt cleared for surgery, can she hold ASA, how many days preop? Have never heard back the name of the physician.

## 2021-11-18 NOTE — TELEPHONE ENCOUNTER
He can be cleared as low risk for major adverse cardiac complications.  Hold ASA 7 days prior to procedure, resume once stable

## 2022-02-01 ENCOUNTER — OFFICE VISIT (OUTPATIENT)
Dept: CARDIOLOGY CLINIC | Age: 72
End: 2022-02-01
Payer: MEDICARE

## 2022-02-01 VITALS
RESPIRATION RATE: 18 BRPM | BODY MASS INDEX: 32.7 KG/M2 | HEART RATE: 68 BPM | HEIGHT: 75 IN | DIASTOLIC BLOOD PRESSURE: 70 MMHG | SYSTOLIC BLOOD PRESSURE: 120 MMHG | WEIGHT: 263 LBS | OXYGEN SATURATION: 98 %

## 2022-02-01 DIAGNOSIS — R06.02 SOB (SHORTNESS OF BREATH) ON EXERTION: ICD-10-CM

## 2022-02-01 DIAGNOSIS — I25.10 CORONARY ARTERY DISEASE INVOLVING NATIVE CORONARY ARTERY OF NATIVE HEART WITHOUT ANGINA PECTORIS: Primary | ICD-10-CM

## 2022-02-01 DIAGNOSIS — E78.2 MIXED HYPERLIPIDEMIA: ICD-10-CM

## 2022-02-01 PROCEDURE — G8510 SCR DEP NEG, NO PLAN REQD: HCPCS | Performed by: INTERNAL MEDICINE

## 2022-02-01 PROCEDURE — 3017F COLORECTAL CA SCREEN DOC REV: CPT | Performed by: INTERNAL MEDICINE

## 2022-02-01 PROCEDURE — G8427 DOCREV CUR MEDS BY ELIG CLIN: HCPCS | Performed by: INTERNAL MEDICINE

## 2022-02-01 PROCEDURE — 93000 ELECTROCARDIOGRAM COMPLETE: CPT | Performed by: INTERNAL MEDICINE

## 2022-02-01 PROCEDURE — G8536 NO DOC ELDER MAL SCRN: HCPCS | Performed by: INTERNAL MEDICINE

## 2022-02-01 PROCEDURE — G8417 CALC BMI ABV UP PARAM F/U: HCPCS | Performed by: INTERNAL MEDICINE

## 2022-02-01 PROCEDURE — 1101F PT FALLS ASSESS-DOCD LE1/YR: CPT | Performed by: INTERNAL MEDICINE

## 2022-02-01 PROCEDURE — 99214 OFFICE O/P EST MOD 30 MIN: CPT | Performed by: INTERNAL MEDICINE

## 2022-02-01 RX ORDER — LOTEPREDNOL ETABONATE 3.8 MG/G
1 GEL OPHTHALMIC 2 TIMES DAILY
COMMUNITY
Start: 2021-08-02

## 2022-02-01 RX ORDER — GLUCOSAMINE SULFATE 1500 MG
25 POWDER IN PACKET (EA) ORAL DAILY
COMMUNITY

## 2022-02-01 RX ORDER — VITAMIN E 1000 UNIT
1000 CAPSULE ORAL DAILY
COMMUNITY

## 2022-02-01 RX ORDER — OXYCODONE HYDROCHLORIDE 5 MG/1
TABLET ORAL
COMMUNITY
Start: 2021-12-07 | End: 2022-02-01 | Stop reason: DRUGHIGH

## 2022-02-01 RX ORDER — LANOLIN ALCOHOL/MO/W.PET/CERES
400 CREAM (GRAM) TOPICAL
COMMUNITY

## 2022-02-01 NOTE — PROGRESS NOTES
1. Have you been to the ER, urgent care clinic since your last visit? Hospitalized since your last visit? 12/8/21 [facility] ER, SOB  2. Have you seen or consulted any other health care providers outside of the [address] [place] [address] since your last visit? Include any pap sme. ccars or colon screening. No     Chief Complaint   Patient presents with    Follow-up     6 mo f/u.   No cardiac concerns

## 2022-02-01 NOTE — PROGRESS NOTES
Tigre Avalos MD          NAME:  Bert Stewart Sr   :   1950   MRN:   [id number]   PCP:  Abigail Arroyo MD           Subjective: The patient is a 66y.o. year old male  who returns for a routine follow-up. Since the last visit, patient reports no change in exercise tolerance, chest pain, edema, medication intolerance, palpitations, shortness of breath, PND/orthopnea wheezing, sputum, syncope, dizziness or light headedness. Doing well. Past Medical History:   Diagnosis Date    CAD (coronary artery disease)     Cancer ([facility]     bladder caner    Gastrointestinal disorder     GERD    Hyperlipidemia     Hypertension     Myocardial infarction ([facility]     Stroke ([facility]     x's 2    Tobacco abuse 2019        ICD-10-CM ICD-9-CM    1. Coronary artery disease involving native coronary artery of native heart without angina pectoris  I25.10 414.01 AMB POC EKG ROUTINE W/ 12 LEADS, INTER & REP   2. Mixed hyperlipidemia  E78.2 272.2    3. SOB (shortness of breath) on exertion  R06.02 786.05       Social History     Tobacco Use    Smoking status: Current Every Day Smoker     Packs/day: 1.00     Years: 57.00     Pack years: 57.00     Types: Cigarettes    Smokeless tobacco: Never Used   Substance Use Topics    Alcohol use: No      Family History   Problem Relation Age of Onset    Heart Attack Mother     Stroke Mother         Review of Systems  Cardiovascular: Negative except as noted in HPI      Objective:       Vitals:    22 1344   BP: 120/70   Pulse: 68   Resp: 18   SpO2: 98%   Weight: 263 lb (119.3 kg)   Height: 6' 3\" (1.905 m)    Body mass index is 32.87 kg/m². General PE  Mental Status - Alert. General Appearance - Not in acute distress. Chest and Lung Exam   Inspection: Accessory muscles - No use of accessory muscles in breathing.   Auscultation:   Breath sounds: - Normal.    Cardiovascular   Inspection: Jugular vein - Bilateral - Inspection Normal.  Palpation/Percussion:   Apical Impulse: - Normal.  Auscultation: Rhythm - Regular. Heart Sounds - S1 WNL and S2 WNL. No S3 or S4. Murmurs & Other Heart Sounds: Auscultation of the heart reveals - No Murmurs. Peripheral Vascular   Upper Extremity: Inspection - Bilateral - No Cyanotic nailbeds or Digital clubbing. Lower Extremity:   Palpation: Edema - Bilateral - No edema. Data Review:     EKG -  EKG: normal EKG, normal sinus rhythm, unchanged from previous tracings. LABS- @brieflabs@      Allergies reviewed  No Known Allergies    Medications reviewed  Current Outpatient Medications   Medication Sig    loteprednol etabonate (Lotemax SM) 0.38 % drpg 1 Drop two (2) times a day.  testosterone cypionate (DEPO-TESTOSTERONE) 100 mg/mL injection 100 mg every fourteen (14) days.  oxyCODONE-acetaminophen (PERCOCET) 5-325 mg per tablet Take 1 Tab by mouth every eight (8) hours as needed.  gabapentin (NEURONTIN) 300 mg capsule Take 200 mg by mouth daily.  nitroglycerin (NITROSTAT) 0.4 mg SL tablet 1 Tab by SubLINGual route as needed for Chest Pain. Up to 3 doses.  aspirin delayed-release 81 mg tablet Take 1 Tab by mouth daily.  metoprolol succinate (TOPROL XL) 100 mg tablet Take 25 mg by mouth daily. Patient reports that he splits this into quarters to save money and takes 25 mg daily    hydroxychloroquine (PLAQUENIL) 200 mg tablet Take 200 mg by mouth two (2) times a day.  allopurinol (ZYLOPRIM) 100 mg tablet Take 100 mg by mouth two (2) times a day.  fluticasone propionate (FLONASE) 50 mcg/actuation nasal spray 2 Sprays by Both Nostrils route daily as needed.  atorvastatin (LIPITOR) 20 mg tablet Take 20 mg by mouth nightly.  multivitamin (ONE A DAY) tablet Take 1 Tab by mouth daily.  omeprazole (PRILOSEC) 10 mg capsule Take 20 mg by mouth daily.  ascorbic acid, vitamin C, (VITAMIN C) 1,000 mg tablet Take 1,000 mg by mouth daily.     cholecalciferol (VITAMIN D3) 25 mcg (1,000 unit) cap Take 25 mcg by mouth daily.    magnesium oxide (MAG-OX) 400 mg tablet Take 400 mg by mouth.  escitalopram oxalate (LEXAPRO) 10 mg tablet Take 10 mg by mouth daily. (Patient not taking: Reported on 2021)     No current facility-administered medications for this visit. Assessment:       ICD-10-CM ICD-9-CM    1. Coronary artery disease involving native coronary artery of native heart without angina pectoris  I25.10 414.01 AMB POC EKG ROUTINE W/ 12 LEADS, INTER & REP   2. Mixed hyperlipidemia  E78.2 272.2    3. SOB (shortness of breath) on exertion  R06.02 786.05         Orders Placed This Encounter    AMB POC EKG ROUTINE W/ 12 LEADS, INTER & REP     Order Specific Question:   Reason for Exam:     Answer:   routine    ascorbic acid, vitamin C, (VITAMIN C) 1,000 mg tablet     Sig: Take 1,000 mg by mouth daily.  cholecalciferol (VITAMIN D3) 25 mcg (1,000 unit) cap     Sig: Take 25 mcg by mouth daily.  loteprednol etabonate (Lotemax SM) 0.38 % drpg     Si Drop two (2) times a day.  magnesium oxide (MAG-OX) 400 mg tablet     Sig: Take 400 mg by mouth.  DISCONTD: oxyCODONE IR (ROXICODONE) 5 mg immediate release tablet     Sig: TAKE 1 TABLET BY MOUTH EVERY 6 HOURS AS NEEDED FOR MODERATE PAIN       Plan:     Asymptomatic. EKG and BP fine. Lipids per PCP.   F/U 1 yr    Ron Daniels MD

## 2022-02-01 NOTE — LETTER
2/1/2022    Patient: Rudy Ocampo   YOB: 1950   Date of Visit: 2/1/2022     Maribel Ayala MD  [address] [address]  Via Fax: [phone]    Dear Maribel Ayala MD,      Thank you for referring Mr. Elliott Fan to [address] [place] [address] for evaluation. My notes for this consultation are attached. If you have questions, please do not hesitate to call me. I look forward to following your patient along with you.       Sincerely,    Raheem Bardales MD

## 2022-03-18 PROBLEM — I21.4 NSTEMI (NON-ST ELEVATED MYOCARDIAL INFARCTION) (HCC): Status: ACTIVE | Noted: 2019-11-13

## 2022-03-18 PROBLEM — E78.2 MIXED HYPERLIPIDEMIA: Status: ACTIVE | Noted: 2018-12-28

## 2022-03-18 PROBLEM — Z98.890 STATUS POST CARDIAC CATHETERIZATION: Status: ACTIVE | Noted: 2018-12-28

## 2022-03-19 PROBLEM — Z72.0 TOBACCO ABUSE: Status: ACTIVE | Noted: 2019-11-14

## 2022-03-19 PROBLEM — I24.9 ACS (ACUTE CORONARY SYNDROME) (HCC): Status: ACTIVE | Noted: 2018-11-29

## 2022-03-19 PROBLEM — I25.10 ASHD (ARTERIOSCLEROTIC HEART DISEASE): Status: ACTIVE | Noted: 2018-12-28

## 2022-03-19 PROBLEM — R06.02 SOB (SHORTNESS OF BREATH) ON EXERTION: Status: ACTIVE | Noted: 2018-12-28

## 2022-03-20 PROBLEM — I25.110 CORONARY ARTERY DISEASE INVOLVING NATIVE CORONARY ARTERY OF NATIVE HEART WITH UNSTABLE ANGINA PECTORIS (HCC): Status: ACTIVE | Noted: 2018-12-28

## 2023-11-08 ENCOUNTER — APPOINTMENT (OUTPATIENT)
Facility: HOSPITAL | Age: 73
End: 2023-11-08
Payer: MEDICARE

## 2023-11-08 ENCOUNTER — HOSPITAL ENCOUNTER (EMERGENCY)
Facility: HOSPITAL | Age: 73
Discharge: ANOTHER HOSPITAL | End: 2023-11-08
Attending: EMERGENCY MEDICINE
Payer: MEDICARE

## 2023-11-08 VITALS
HEART RATE: 56 BPM | SYSTOLIC BLOOD PRESSURE: 138 MMHG | WEIGHT: 275 LBS | HEIGHT: 72 IN | TEMPERATURE: 98 F | OXYGEN SATURATION: 91 % | BODY MASS INDEX: 37.25 KG/M2 | DIASTOLIC BLOOD PRESSURE: 81 MMHG | RESPIRATION RATE: 19 BRPM

## 2023-11-08 DIAGNOSIS — I63.00 CEREBROVASCULAR ACCIDENT (CVA) DUE TO THROMBOSIS OF PRECEREBRAL ARTERY (HCC): Primary | ICD-10-CM

## 2023-11-08 LAB
ALBUMIN SERPL-MCNC: 3.6 G/DL (ref 3.5–5)
ALBUMIN/GLOB SERPL: 1.1 (ref 1.1–2.2)
ALP SERPL-CCNC: 130 U/L (ref 45–117)
ALT SERPL-CCNC: 30 U/L (ref 12–78)
ANION GAP SERPL CALC-SCNC: 7 MMOL/L (ref 5–15)
AST SERPL-CCNC: 18 U/L (ref 15–37)
BASOPHILS # BLD: 0.1 K/UL (ref 0–0.1)
BASOPHILS NFR BLD: 1 % (ref 0–1)
BILIRUB SERPL-MCNC: 0.7 MG/DL (ref 0.2–1)
BUN SERPL-MCNC: 21 MG/DL (ref 6–20)
BUN/CREAT SERPL: 17 (ref 12–20)
CALCIUM SERPL-MCNC: 8.9 MG/DL (ref 8.5–10.1)
CHLORIDE SERPL-SCNC: 107 MMOL/L (ref 97–108)
CO2 SERPL-SCNC: 28 MMOL/L (ref 21–32)
CREAT SERPL-MCNC: 1.22 MG/DL (ref 0.7–1.3)
DIFFERENTIAL METHOD BLD: ABNORMAL
EOSINOPHIL # BLD: 0.3 K/UL (ref 0–0.4)
EOSINOPHIL NFR BLD: 3 % (ref 0–7)
ERYTHROCYTE [DISTWIDTH] IN BLOOD BY AUTOMATED COUNT: 13.6 % (ref 11.5–14.5)
GLOBULIN SER CALC-MCNC: 3.3 G/DL (ref 2–4)
GLUCOSE BLD STRIP.AUTO-MCNC: 115 MG/DL (ref 65–117)
GLUCOSE BLD STRIP.AUTO-MCNC: 99 MG/DL (ref 65–117)
GLUCOSE SERPL-MCNC: 109 MG/DL (ref 65–100)
HCT VFR BLD AUTO: 40.9 % (ref 36.6–50.3)
HGB BLD-MCNC: 13.5 G/DL (ref 12.1–17)
IMM GRANULOCYTES # BLD AUTO: 0 K/UL (ref 0–0.04)
IMM GRANULOCYTES NFR BLD AUTO: 0 % (ref 0–0.5)
INR PPP: 1.1 (ref 0.9–1.1)
LYMPHOCYTES # BLD: 1.5 K/UL (ref 0.8–3.5)
LYMPHOCYTES NFR BLD: 14 % (ref 12–49)
MCH RBC QN AUTO: 31.7 PG (ref 26–34)
MCHC RBC AUTO-ENTMCNC: 33 G/DL (ref 30–36.5)
MCV RBC AUTO: 96 FL (ref 80–99)
MONOCYTES # BLD: 1.1 K/UL (ref 0–1)
MONOCYTES NFR BLD: 10 % (ref 5–13)
NEUTS SEG # BLD: 7.7 K/UL (ref 1.8–8)
NEUTS SEG NFR BLD: 72 % (ref 32–75)
NRBC # BLD: 0 K/UL (ref 0–0.01)
NRBC BLD-RTO: 0 PER 100 WBC
PLATELET # BLD AUTO: 226 K/UL (ref 150–400)
PMV BLD AUTO: 11.1 FL (ref 8.9–12.9)
POTASSIUM SERPL-SCNC: 4.2 MMOL/L (ref 3.5–5.1)
PROT SERPL-MCNC: 6.9 G/DL (ref 6.4–8.2)
PROTHROMBIN TIME: 10.4 SEC (ref 9–11.1)
RBC # BLD AUTO: 4.26 M/UL (ref 4.1–5.7)
SERVICE CMNT-IMP: NORMAL
SERVICE CMNT-IMP: NORMAL
SODIUM SERPL-SCNC: 142 MMOL/L (ref 136–145)
TROPONIN I SERPL HS-MCNC: 11 NG/L (ref 0–76)
WBC # BLD AUTO: 10.7 K/UL (ref 4.1–11.1)

## 2023-11-08 PROCEDURE — 36415 COLL VENOUS BLD VENIPUNCTURE: CPT

## 2023-11-08 PROCEDURE — 70450 CT HEAD/BRAIN W/O DYE: CPT

## 2023-11-08 PROCEDURE — 70496 CT ANGIOGRAPHY HEAD: CPT

## 2023-11-08 PROCEDURE — 31500 INSERT EMERGENCY AIRWAY: CPT

## 2023-11-08 PROCEDURE — 85025 COMPLETE CBC W/AUTO DIFF WBC: CPT

## 2023-11-08 PROCEDURE — 2580000003 HC RX 258: Performed by: EMERGENCY MEDICINE

## 2023-11-08 PROCEDURE — 99285 EMERGENCY DEPT VISIT HI MDM: CPT

## 2023-11-08 PROCEDURE — 85610 PROTHROMBIN TIME: CPT

## 2023-11-08 PROCEDURE — 80053 COMPREHEN METABOLIC PANEL: CPT

## 2023-11-08 PROCEDURE — 6360000002 HC RX W HCPCS: Performed by: EMERGENCY MEDICINE

## 2023-11-08 PROCEDURE — 96374 THER/PROPH/DIAG INJ IV PUSH: CPT

## 2023-11-08 PROCEDURE — 84484 ASSAY OF TROPONIN QUANT: CPT

## 2023-11-08 PROCEDURE — 6360000004 HC RX CONTRAST MEDICATION: Performed by: EMERGENCY MEDICINE

## 2023-11-08 PROCEDURE — 70498 CT ANGIOGRAPHY NECK: CPT

## 2023-11-08 PROCEDURE — 82962 GLUCOSE BLOOD TEST: CPT

## 2023-11-08 RX ORDER — 0.9 % SODIUM CHLORIDE 0.9 %
1000 INTRAVENOUS SOLUTION INTRAVENOUS ONCE
Status: COMPLETED | OUTPATIENT
Start: 2023-11-08 | End: 2023-11-08

## 2023-11-08 RX ORDER — SODIUM CHLORIDE 0.9 % (FLUSH) 0.9 %
5-40 SYRINGE (ML) INJECTION PRN
Status: DISCONTINUED | OUTPATIENT
Start: 2023-11-08 | End: 2023-11-09 | Stop reason: HOSPADM

## 2023-11-08 RX ORDER — SODIUM CHLORIDE 0.9 % (FLUSH) 0.9 %
10 SYRINGE (ML) INJECTION ONCE
Status: DISCONTINUED | OUTPATIENT
Start: 2023-11-08 | End: 2023-11-09 | Stop reason: HOSPADM

## 2023-11-08 RX ORDER — SODIUM CHLORIDE 0.9 % (FLUSH) 0.9 %
10 SYRINGE (ML) INJECTION ONCE
Status: COMPLETED | OUTPATIENT
Start: 2023-11-08 | End: 2023-11-08

## 2023-11-08 RX ORDER — SODIUM CHLORIDE 9 MG/ML
INJECTION, SOLUTION INTRAVENOUS PRN
Status: DISCONTINUED | OUTPATIENT
Start: 2023-11-08 | End: 2023-11-09 | Stop reason: HOSPADM

## 2023-11-08 RX ORDER — SODIUM CHLORIDE 0.9 % (FLUSH) 0.9 %
5-40 SYRINGE (ML) INJECTION EVERY 12 HOURS SCHEDULED
Status: DISCONTINUED | OUTPATIENT
Start: 2023-11-08 | End: 2023-11-09 | Stop reason: HOSPADM

## 2023-11-08 RX ADMIN — Medication 25 MG: at 13:41

## 2023-11-08 RX ADMIN — SODIUM CHLORIDE, PRESERVATIVE FREE 10 ML: 5 INJECTION INTRAVENOUS at 13:44

## 2023-11-08 RX ADMIN — SODIUM CHLORIDE 1000 ML: 9 INJECTION, SOLUTION INTRAVENOUS at 22:25

## 2023-11-08 RX ADMIN — IOPAMIDOL 100 ML: 755 INJECTION, SOLUTION INTRAVENOUS at 14:01

## 2023-11-08 ASSESSMENT — ENCOUNTER SYMPTOMS
SHORTNESS OF BREATH: 0
SORE THROAT: 0
EYE REDNESS: 0
COUGH: 0
NAUSEA: 0
DIARRHEA: 0
VOMITING: 0
ABDOMINAL PAIN: 0

## 2023-11-08 ASSESSMENT — LIFESTYLE VARIABLES
HOW MANY STANDARD DRINKS CONTAINING ALCOHOL DO YOU HAVE ON A TYPICAL DAY: PATIENT DOES NOT DRINK
HOW OFTEN DO YOU HAVE A DRINK CONTAINING ALCOHOL: NEVER

## 2023-11-08 ASSESSMENT — PAIN - FUNCTIONAL ASSESSMENT: PAIN_FUNCTIONAL_ASSESSMENT: 0-10

## 2023-11-08 ASSESSMENT — PAIN SCALES - GENERAL: PAINLEVEL_OUTOF10: 0

## 2023-11-08 NOTE — ED NOTES
Pt has been assigned Rm # [id number] at [facility], number for nursing report is [phone]. Nursing Supervisor is aware.      Raymond Temple RN  11/08/23 9969

## 2023-11-08 NOTE — ED TRIAGE NOTES
Pt arrived by POV with right sided weakness. Per pt around [phone] he lost function of his right side and fell. Pt reports he has has two strokes in the past with right sided deficit. Pt is awake alert and oriented X 4. Pt noted not able to stand or bear weight. Pt educated on ER flow.

## 2023-11-08 NOTE — ED NOTES
Pt transported to CT in [facility] for CTA with CT tech.      Jasvir Chaidez RN  11/08/23 9011

## 2023-11-08 NOTE — ED PROVIDER NOTES
EMERGENCY DEPARTMENT HISTORY AND PHYSICAL EXAM      Date: 11/8/2023  Patient Name: Terrance Cast    History of Presenting Illness     Chief Complaint   Patient presents with    Extremity Weakness     right       History Provided By: Patient    HPI: Terrance Cast, 74 y.o. male with PMHx significant for CAD, hypertension, hyperlipidemia, prior CVA, presents via private vehicle to the ED with cc of possible stroke. Around 11:15 AM.  Patient lost function in his right arm and leg and fell. No significant injuries from the fall. He does report to prior strokes with some mild right-sided weakness. He was unable to stand or bear weight due to the weakness. Denies any chest pain or shortness of breath. He is currently not on any anticoagulants other than daily baby aspirin. PMHx: Significant for bladder cancer, CAD, hypertension, hyperlipidemia, prior CVA, COPD  PSHx: Significant for splenectomy, coronary stent, carpal tunnel surgery, left TKR. Social Hx: Pack-a-day smoker. Denies alcohol use. There are no other complaints, changes, or physical findings at this time. PCP: Arron Hoyos MD    No current facility-administered medications on file prior to encounter. Current Outpatient Medications on File Prior to Encounter   Medication Sig Dispense Refill    allopurinol (ZYLOPRIM) 100 MG tablet Take 100 mg by mouth 2 times daily      ascorbic acid (VITAMIN C) 1000 MG tablet Take 1,000 mg by mouth daily      aspirin 81 MG EC tablet Take 81 mg by mouth daily      atorvastatin (LIPITOR) 20 MG tablet Take 20 mg by mouth nightly      vitamin D 25 MCG (1000 UT) CAPS Take 25 mcg by mouth daily      escitalopram (LEXAPRO) 10 MG tablet Take 10 mg by mouth daily      fluticasone (FLONASE) 50 MCG/ACT nasal spray 2 sprays by Nasal route daily as needed      gabapentin (NEURONTIN) 300 MG capsule Take 200 mg by mouth daily.       hydroxychloroquine (PLAQUENIL) 200 MG tablet Take 200 mg by mouth 2 times daily

## 2023-11-09 ENCOUNTER — APPOINTMENT (OUTPATIENT)
Facility: HOSPITAL | Age: 73
DRG: 064 | End: 2023-11-09
Attending: INTERNAL MEDICINE
Payer: MEDICARE

## 2023-11-09 ENCOUNTER — HOSPITAL ENCOUNTER (INPATIENT)
Facility: HOSPITAL | Age: 73
LOS: 11 days | Discharge: NURSING, REHAB OR LONG-TERM CARE | DRG: 064 | End: 2023-11-20
Attending: INTERNAL MEDICINE | Admitting: HOSPITALIST
Payer: MEDICARE

## 2023-11-09 DIAGNOSIS — I63.9 CEREBROVASCULAR ACCIDENT (CVA), UNSPECIFIED MECHANISM (HCC): Primary | ICD-10-CM

## 2023-11-09 PROBLEM — I67.9 INTRACRANIAL VASCULAR STENOSIS: Status: ACTIVE | Noted: 2023-11-09

## 2023-11-09 PROBLEM — I63.312 CEREBROVASCULAR ACCIDENT (CVA) DUE TO THROMBOSIS OF LEFT MIDDLE CEREBRAL ARTERY (HCC): Status: ACTIVE | Noted: 2023-11-09

## 2023-11-09 PROBLEM — R53.1 ACUTE RIGHT-SIDED WEAKNESS: Status: ACTIVE | Noted: 2023-11-09

## 2023-11-09 LAB
ANION GAP SERPL CALC-SCNC: 4 MMOL/L (ref 5–15)
BUN SERPL-MCNC: 17 MG/DL (ref 6–20)
BUN/CREAT SERPL: 15 (ref 12–20)
CALCIUM SERPL-MCNC: 8.4 MG/DL (ref 8.5–10.1)
CHLORIDE SERPL-SCNC: 114 MMOL/L (ref 97–108)
CHOLEST SERPL-MCNC: 98 MG/DL
CO2 SERPL-SCNC: 26 MMOL/L (ref 21–32)
COMMENT:: NORMAL
CREAT SERPL-MCNC: 1.1 MG/DL (ref 0.7–1.3)
ECHO AO ASC DIAM: 4.2 CM
ECHO AO ASCENDING AORTA INDEX: 1.72 CM/M2
ECHO AO ROOT DIAM: 4.2 CM
ECHO AO ROOT INDEX: 1.72 CM/M2
ECHO AV AREA PEAK VELOCITY: 2.8 CM2
ECHO AV AREA/BSA PEAK VELOCITY: 1.1 CM2/M2
ECHO AV PEAK GRADIENT: 9 MMHG
ECHO AV PEAK VELOCITY: 1.5 M/S
ECHO AV VELOCITY RATIO: 0.67
ECHO BSA: 2.52 M2
ECHO LA DIAMETER INDEX: 1.89 CM/M2
ECHO LA DIAMETER: 4.6 CM
ECHO LA TO AORTIC ROOT RATIO: 1.1
ECHO LV E' LATERAL VELOCITY: 10 CM/S
ECHO LV E' SEPTAL VELOCITY: 6 CM/S
ECHO LV FRACTIONAL SHORTENING: 27 % (ref 28–44)
ECHO LV INTERNAL DIMENSION DIASTOLE INDEX: 2.09 CM/M2
ECHO LV INTERNAL DIMENSION DIASTOLIC: 5.1 CM (ref 4.2–5.9)
ECHO LV INTERNAL DIMENSION SYSTOLIC INDEX: 1.52 CM/M2
ECHO LV INTERNAL DIMENSION SYSTOLIC: 3.7 CM
ECHO LV IVSD: 1.4 CM (ref 0.6–1)
ECHO LV MASS 2D: 285.1 G (ref 88–224)
ECHO LV MASS INDEX 2D: 116.8 G/M2 (ref 49–115)
ECHO LV POSTERIOR WALL DIASTOLIC: 1.3 CM (ref 0.6–1)
ECHO LV RELATIVE WALL THICKNESS RATIO: 0.51
ECHO LVOT AREA: 4.2 CM2
ECHO LVOT DIAM: 2.3 CM
ECHO LVOT PEAK GRADIENT: 4 MMHG
ECHO LVOT PEAK VELOCITY: 1 M/S
ECHO MV A VELOCITY: 0.7 M/S
ECHO MV E DECELERATION TIME (DT): 263.2 MS
ECHO MV E VELOCITY: 0.56 M/S
ECHO MV E/A RATIO: 0.8
ECHO MV E/E' LATERAL: 5.6
ECHO PV MAX VELOCITY: 1 M/S
ECHO PV PEAK GRADIENT: 4 MMHG
ECHO RV FREE WALL PEAK S': 20 CM/S
ECHO RV TAPSE: 2.1 CM (ref 1.7–?)
ERYTHROCYTE [DISTWIDTH] IN BLOOD BY AUTOMATED COUNT: 13.7 % (ref 11.5–14.5)
GLUCOSE SERPL-MCNC: 98 MG/DL (ref 65–100)
HCT VFR BLD AUTO: 39.6 % (ref 36.6–50.3)
HDLC SERPL-MCNC: 40 MG/DL
HDLC SERPL: 2.5 (ref 0–5)
HGB BLD-MCNC: 13.2 G/DL (ref 12.1–17)
LDLC SERPL CALC-MCNC: 44.6 MG/DL (ref 0–100)
MAGNESIUM SERPL-MCNC: 2.2 MG/DL (ref 1.6–2.4)
MCH RBC QN AUTO: 32.9 PG (ref 26–34)
MCHC RBC AUTO-ENTMCNC: 33.3 G/DL (ref 30–36.5)
MCV RBC AUTO: 98.8 FL (ref 80–99)
NRBC # BLD: 0 K/UL (ref 0–0.01)
NRBC BLD-RTO: 0 PER 100 WBC
PHOSPHATE SERPL-MCNC: 2.3 MG/DL (ref 2.6–4.7)
PLATELET # BLD AUTO: 204 K/UL (ref 150–400)
PMV BLD AUTO: 11.8 FL (ref 8.9–12.9)
POTASSIUM SERPL-SCNC: 4 MMOL/L (ref 3.5–5.1)
RBC # BLD AUTO: 4.01 M/UL (ref 4.1–5.7)
SODIUM SERPL-SCNC: 144 MMOL/L (ref 136–145)
SPECIMEN HOLD: NORMAL
TRIGL SERPL-MCNC: 67 MG/DL
TSH SERPL DL<=0.05 MIU/L-ACNC: 4.15 UIU/ML (ref 0.36–3.74)
VLDLC SERPL CALC-MCNC: 13.4 MG/DL
WBC # BLD AUTO: 9 K/UL (ref 4.1–11.1)

## 2023-11-09 PROCEDURE — 2700000000 HC OXYGEN THERAPY PER DAY

## 2023-11-09 PROCEDURE — 70551 MRI BRAIN STEM W/O DYE: CPT

## 2023-11-09 PROCEDURE — 2580000003 HC RX 258: Performed by: NURSE PRACTITIONER

## 2023-11-09 PROCEDURE — 83735 ASSAY OF MAGNESIUM: CPT

## 2023-11-09 PROCEDURE — 1100000003 HC PRIVATE W/ TELEMETRY

## 2023-11-09 PROCEDURE — 97530 THERAPEUTIC ACTIVITIES: CPT | Performed by: OCCUPATIONAL THERAPIST

## 2023-11-09 PROCEDURE — 84100 ASSAY OF PHOSPHORUS: CPT

## 2023-11-09 PROCEDURE — C8929 TTE W OR WO FOL WCON,DOPPLER: HCPCS

## 2023-11-09 PROCEDURE — 85027 COMPLETE CBC AUTOMATED: CPT

## 2023-11-09 PROCEDURE — 6360000002 HC RX W HCPCS: Performed by: INTERNAL MEDICINE

## 2023-11-09 PROCEDURE — 94760 N-INVAS EAR/PLS OXIMETRY 1: CPT

## 2023-11-09 PROCEDURE — 83036 HEMOGLOBIN GLYCOSYLATED A1C: CPT

## 2023-11-09 PROCEDURE — 80061 LIPID PANEL: CPT

## 2023-11-09 PROCEDURE — 6370000000 HC RX 637 (ALT 250 FOR IP): Performed by: INTERNAL MEDICINE

## 2023-11-09 PROCEDURE — 6360000002 HC RX W HCPCS: Performed by: NURSE PRACTITIONER

## 2023-11-09 PROCEDURE — 6370000000 HC RX 637 (ALT 250 FOR IP): Performed by: NURSE PRACTITIONER

## 2023-11-09 PROCEDURE — 97535 SELF CARE MNGMENT TRAINING: CPT | Performed by: OCCUPATIONAL THERAPIST

## 2023-11-09 PROCEDURE — 36415 COLL VENOUS BLD VENIPUNCTURE: CPT

## 2023-11-09 PROCEDURE — 80048 BASIC METABOLIC PNL TOTAL CA: CPT

## 2023-11-09 PROCEDURE — 97165 OT EVAL LOW COMPLEX 30 MIN: CPT | Performed by: OCCUPATIONAL THERAPIST

## 2023-11-09 PROCEDURE — 70450 CT HEAD/BRAIN W/O DYE: CPT

## 2023-11-09 PROCEDURE — 97116 GAIT TRAINING THERAPY: CPT

## 2023-11-09 PROCEDURE — 84443 ASSAY THYROID STIM HORMONE: CPT

## 2023-11-09 PROCEDURE — 99223 1ST HOSP IP/OBS HIGH 75: CPT | Performed by: PSYCHIATRY & NEUROLOGY

## 2023-11-09 PROCEDURE — 97530 THERAPEUTIC ACTIVITIES: CPT

## 2023-11-09 PROCEDURE — 6360000004 HC RX CONTRAST MEDICATION: Performed by: HOSPITALIST

## 2023-11-09 PROCEDURE — 97162 PT EVAL MOD COMPLEX 30 MIN: CPT

## 2023-11-09 PROCEDURE — 92610 EVALUATE SWALLOWING FUNCTION: CPT

## 2023-11-09 PROCEDURE — 94640 AIRWAY INHALATION TREATMENT: CPT

## 2023-11-09 RX ORDER — ESCITALOPRAM OXALATE 10 MG/1
10 TABLET ORAL DAILY
Status: DISCONTINUED | OUTPATIENT
Start: 2023-11-09 | End: 2023-11-20 | Stop reason: HOSPADM

## 2023-11-09 RX ORDER — POLYETHYLENE GLYCOL 3350 17 G/17G
17 POWDER, FOR SOLUTION ORAL DAILY PRN
Status: DISCONTINUED | OUTPATIENT
Start: 2023-11-09 | End: 2023-11-20 | Stop reason: HOSPADM

## 2023-11-09 RX ORDER — SODIUM CHLORIDE 0.9 % (FLUSH) 0.9 %
5-40 SYRINGE (ML) INJECTION EVERY 12 HOURS SCHEDULED
Status: DISCONTINUED | OUTPATIENT
Start: 2023-11-09 | End: 2023-11-20 | Stop reason: HOSPADM

## 2023-11-09 RX ORDER — SODIUM CHLORIDE 9 MG/ML
INJECTION, SOLUTION INTRAVENOUS PRN
Status: DISCONTINUED | OUTPATIENT
Start: 2023-11-09 | End: 2023-11-20 | Stop reason: HOSPADM

## 2023-11-09 RX ORDER — ENOXAPARIN SODIUM 100 MG/ML
40 INJECTION SUBCUTANEOUS DAILY
Status: DISCONTINUED | OUTPATIENT
Start: 2023-11-09 | End: 2023-11-16

## 2023-11-09 RX ORDER — IPRATROPIUM BROMIDE AND ALBUTEROL SULFATE 2.5; .5 MG/3ML; MG/3ML
1 SOLUTION RESPIRATORY (INHALATION) EVERY 6 HOURS PRN
Status: DISCONTINUED | OUTPATIENT
Start: 2023-11-09 | End: 2023-11-20 | Stop reason: HOSPADM

## 2023-11-09 RX ORDER — GABAPENTIN 100 MG/1
200 CAPSULE ORAL DAILY
Status: DISCONTINUED | OUTPATIENT
Start: 2023-11-10 | End: 2023-11-09

## 2023-11-09 RX ORDER — CETIRIZINE HYDROCHLORIDE 10 MG/1
10 TABLET ORAL DAILY PRN
COMMUNITY
Start: 2023-10-04

## 2023-11-09 RX ORDER — FLUTICASONE PROPIONATE 50 MCG
1 SPRAY, SUSPENSION (ML) NASAL DAILY
Status: DISCONTINUED | OUTPATIENT
Start: 2023-11-09 | End: 2023-11-20 | Stop reason: HOSPADM

## 2023-11-09 RX ORDER — SODIUM CHLORIDE 9 MG/ML
INJECTION, SOLUTION INTRAVENOUS CONTINUOUS
Status: DISCONTINUED | OUTPATIENT
Start: 2023-11-09 | End: 2023-11-09

## 2023-11-09 RX ORDER — HYDROXYCHLOROQUINE SULFATE 200 MG/1
200 TABLET, FILM COATED ORAL 2 TIMES DAILY
Status: DISCONTINUED | OUTPATIENT
Start: 2023-11-09 | End: 2023-11-20 | Stop reason: HOSPADM

## 2023-11-09 RX ORDER — METOPROLOL SUCCINATE 25 MG/1
25 TABLET, EXTENDED RELEASE ORAL DAILY
Status: DISCONTINUED | OUTPATIENT
Start: 2023-11-10 | End: 2023-11-20 | Stop reason: HOSPADM

## 2023-11-09 RX ORDER — SODIUM CHLORIDE 0.9 % (FLUSH) 0.9 %
5-40 SYRINGE (ML) INJECTION PRN
Status: DISCONTINUED | OUTPATIENT
Start: 2023-11-09 | End: 2023-11-20 | Stop reason: HOSPADM

## 2023-11-09 RX ORDER — ATORVASTATIN CALCIUM 20 MG/1
20 TABLET, FILM COATED ORAL NIGHTLY
Status: DISCONTINUED | OUTPATIENT
Start: 2023-11-09 | End: 2023-11-20 | Stop reason: HOSPADM

## 2023-11-09 RX ORDER — GABAPENTIN 300 MG/1
300 CAPSULE ORAL DAILY
Status: DISCONTINUED | OUTPATIENT
Start: 2023-11-10 | End: 2023-11-20 | Stop reason: HOSPADM

## 2023-11-09 RX ORDER — ONDANSETRON 4 MG/1
4 TABLET, ORALLY DISINTEGRATING ORAL EVERY 8 HOURS PRN
Status: DISCONTINUED | OUTPATIENT
Start: 2023-11-09 | End: 2023-11-20 | Stop reason: HOSPADM

## 2023-11-09 RX ORDER — ASPIRIN 81 MG/1
81 TABLET, CHEWABLE ORAL DAILY
Status: DISCONTINUED | OUTPATIENT
Start: 2023-11-09 | End: 2023-11-20 | Stop reason: HOSPADM

## 2023-11-09 RX ORDER — ATORVASTATIN CALCIUM 40 MG/1
80 TABLET, FILM COATED ORAL NIGHTLY
Status: DISCONTINUED | OUTPATIENT
Start: 2023-11-09 | End: 2023-11-09

## 2023-11-09 RX ORDER — ALLOPURINOL 100 MG/1
100 TABLET ORAL 2 TIMES DAILY
Status: DISCONTINUED | OUTPATIENT
Start: 2023-11-09 | End: 2023-11-20 | Stop reason: HOSPADM

## 2023-11-09 RX ORDER — ONDANSETRON 2 MG/ML
4 INJECTION INTRAMUSCULAR; INTRAVENOUS EVERY 6 HOURS PRN
Status: DISCONTINUED | OUTPATIENT
Start: 2023-11-09 | End: 2023-11-20 | Stop reason: HOSPADM

## 2023-11-09 RX ORDER — 0.9 % SODIUM CHLORIDE 0.9 %
500 INTRAVENOUS SOLUTION INTRAVENOUS ONCE
Status: COMPLETED | OUTPATIENT
Start: 2023-11-09 | End: 2023-11-09

## 2023-11-09 RX ORDER — ASPIRIN 300 MG/1
300 SUPPOSITORY RECTAL DAILY
Status: DISCONTINUED | OUTPATIENT
Start: 2023-11-09 | End: 2023-11-20 | Stop reason: HOSPADM

## 2023-11-09 RX ADMIN — SODIUM CHLORIDE, PRESERVATIVE FREE 10 ML: 5 INJECTION INTRAVENOUS at 20:47

## 2023-11-09 RX ADMIN — ALLOPURINOL 100 MG: 100 TABLET ORAL at 20:47

## 2023-11-09 RX ADMIN — HYDROXYCHLOROQUINE SULFATE 200 MG: 200 TABLET ORAL at 20:47

## 2023-11-09 RX ADMIN — IPRATROPIUM BROMIDE AND ALBUTEROL SULFATE 1 DOSE: .5; 3 SOLUTION RESPIRATORY (INHALATION) at 05:06

## 2023-11-09 RX ADMIN — SERTRALINE HYDROCHLORIDE 50 MG: 50 TABLET ORAL at 17:27

## 2023-11-09 RX ADMIN — ESCITALOPRAM OXALATE 10 MG: 10 TABLET ORAL at 17:27

## 2023-11-09 RX ADMIN — IPRATROPIUM BROMIDE 0.5 MG: 0.5 SOLUTION RESPIRATORY (INHALATION) at 20:30

## 2023-11-09 RX ADMIN — PERFLUTREN 1.5 ML: 6.52 INJECTION, SUSPENSION INTRAVENOUS at 10:09

## 2023-11-09 RX ADMIN — ATORVASTATIN CALCIUM 20 MG: 20 TABLET, FILM COATED ORAL at 20:47

## 2023-11-09 RX ADMIN — ENOXAPARIN SODIUM 40 MG: 100 INJECTION SUBCUTANEOUS at 16:47

## 2023-11-09 RX ADMIN — SODIUM CHLORIDE 500 ML: 9 INJECTION, SOLUTION INTRAVENOUS at 01:21

## 2023-11-09 RX ADMIN — SODIUM CHLORIDE, PRESERVATIVE FREE 10 ML: 5 INJECTION INTRAVENOUS at 09:09

## 2023-11-09 RX ADMIN — ASPIRIN 81 MG: 81 TABLET, CHEWABLE ORAL at 16:47

## 2023-11-09 RX ADMIN — FLUTICASONE PROPIONATE 1 SPRAY: 50 SPRAY, METERED NASAL at 16:47

## 2023-11-09 RX ADMIN — SODIUM CHLORIDE: 9 INJECTION, SOLUTION INTRAVENOUS at 02:21

## 2023-11-09 ASSESSMENT — ENCOUNTER SYMPTOMS
RESPIRATORY NEGATIVE: 1
GASTROINTESTINAL NEGATIVE: 1
ALLERGIC/IMMUNOLOGIC NEGATIVE: 1
EYES NEGATIVE: 1

## 2023-11-09 ASSESSMENT — PAIN DESCRIPTION - LOCATION
LOCATION: GENERALIZED
LOCATION: GENERALIZED

## 2023-11-09 ASSESSMENT — PAIN SCALES - GENERAL
PAINLEVEL_OUTOF10: 4
PAINLEVEL_OUTOF10: 0
PAINLEVEL_OUTOF10: 0
PAINLEVEL_OUTOF10: 5

## 2023-11-09 ASSESSMENT — PAIN DESCRIPTION - ORIENTATION: ORIENTATION: POSTERIOR

## 2023-11-09 ASSESSMENT — PAIN DESCRIPTION - PAIN TYPE: TYPE: CHRONIC PAIN

## 2023-11-09 ASSESSMENT — PAIN DESCRIPTION - DESCRIPTORS: DESCRIPTORS: ACHING

## 2023-11-09 NOTE — ED NOTES
Updated Lianna Garcia RN in regards to pt status. [facility] assumes care of pt at this time.      Chichi Ayala RN  11/08/23 9077

## 2023-11-09 NOTE — ED NOTES
TRANSFER - OUT REPORT:    Verbal report given to Hailey Adames RN on Jed Bucio  being transferred to [address] [place] [address]  ICU 0209-2 for routine progression of patient care       Report consisted of patient's Situation, Background, Assessment and   Recommendations(SBAR). Information from the following report(s) Nurse Handoff Report, ED Encounter Summary, ED SBAR, [facility], Recent Results, Cardiac Rhythm sinus Kit Gunderson, and Neuro Assessment was reviewed with the receiving nurse. Keon Fall Assessment:    Presents to emergency department  because of falls (Syncope, seizure, or loss of consciousness): No  Age > 70: Yes  Altered Mental Status, Intoxication with alcohol or substance confusion (Disorientation, impaired judgment, poor safety awaremess, or inability to follow instructions): No  Impaired Mobility: Ambulates or transfers with assistive devices or assistance; Unable to ambulate or transer.: No  Nursing Judgement: No          Lines:   Peripheral IV 11/08/23 Distal;Left Cephalic (Active)       Peripheral IV 11/08/23 Right Antecubital (Active)        Opportunity for questions and clarification was provided.       Patient transported with:  Monitor          Vi Palmer RN  11/08/23 2050

## 2023-11-09 NOTE — ED NOTES
Attempted to call report on patient, placed on hold for 5 minutes with no answer     Grisel Ny RN  11/08/23 0377

## 2023-11-09 NOTE — ED NOTES
Pt back in dept and states that his latest symptoms have resolved.       Royce Gee RN  11/08/23 6852

## 2023-11-09 NOTE — CONSULTS
Neurology Note    Patient ID:  Lydia Fletcher   [id number]  74 y.o.  1950      Date of Consultation:  November 9, 2023    Referring Physician: Dr. Bryce Chester    Reason for Consultation:  acute stroke      Assessment and Plan:    The patient is a pleasant 49-year-old male with multiple medical conditions who developed acute onset of right-sided hemiparesis. He did receive tenecteplase for concerns of an acute stroke. Examination does reveal persistent right-sided hemiparesis with mild hemisensory deficit. Acute stroke  Risk factors for stroke include hypertension, dyslipidemia, smoking  Status post tenecteplase  Initial head CT with no acute abnormality  The patient does need a 24-hour post tenecteplase head CT to evaluate for hemorrhage. If no hemorrhage, the patient should be started on 81 mg aspirin  Patient with then be on dual antiplatelet therapy of aspirin and 75 mg Plavix for 21 days  Brain MRI ordered  Echocardiogram ordered  Hypertension: Nonaggressive blood pressure control for the first 24 hours and goal blood pressure less than 140/90  Dyslipidemia: LDL at goal.  Continue home statin therapy  Hemoglobin A1c pending  Patient does need PT, OT therapy  Smoking cessation education provided    I provided stroke education today in regards to risk factors for stroke and lifestyle modifications to help minimize the risk of future stroke. This included medication compliance, regular follow up with primary care physician,  and healthy lifestyle habits (nutrition/exercise)      Coronary artery disease  COPD    Neurology will continue to follow closely in this complicated patient with ongoing neurological symptoms necessitating additional testing. Updated orders placed.   Care plan discussed with primary team, Dr. Bryce Chester             Subjective: right sided weakness       History of Present Illness:   Martha Schaefer is a 74 y.o. male with a history of coronary artery disease, hypertension, COPD and prior

## 2023-11-09 NOTE — INTERDISCIPLINARY ROUNDS
Critical care interdisciplinary rounds today. Following members present: Case Management, Nursing, Nutrition, Pharmacy, and Physician. Plan of care discussed. See clinical pathway for plan of care and interventions and desired outcomes.

## 2023-11-09 NOTE — ED NOTES
Per MD, call back teleneuro for updated consult related to pt symptoms. Consult placed, ED Provider to be called back.      Nabila Canales RN  37/24/44 7697

## 2023-11-10 LAB
ANION GAP SERPL CALC-SCNC: 7 MMOL/L (ref 5–15)
BUN SERPL-MCNC: 15 MG/DL (ref 6–20)
BUN/CREAT SERPL: 15 (ref 12–20)
CALCIUM SERPL-MCNC: 8 MG/DL (ref 8.5–10.1)
CHLORIDE SERPL-SCNC: 111 MMOL/L (ref 97–108)
CO2 SERPL-SCNC: 26 MMOL/L (ref 21–32)
CREAT SERPL-MCNC: 1.02 MG/DL (ref 0.7–1.3)
ERYTHROCYTE [DISTWIDTH] IN BLOOD BY AUTOMATED COUNT: 13.7 % (ref 11.5–14.5)
EST. AVERAGE GLUCOSE BLD GHB EST-MCNC: 117 MG/DL
GLUCOSE SERPL-MCNC: 112 MG/DL (ref 65–100)
HBA1C MFR BLD: 5.7 % (ref 4–5.6)
HCT VFR BLD AUTO: 37.6 % (ref 36.6–50.3)
HGB BLD-MCNC: 12.4 G/DL (ref 12.1–17)
MCH RBC QN AUTO: 31.9 PG (ref 26–34)
MCHC RBC AUTO-ENTMCNC: 33 G/DL (ref 30–36.5)
MCV RBC AUTO: 96.7 FL (ref 80–99)
NRBC # BLD: 0 K/UL (ref 0–0.01)
NRBC BLD-RTO: 0 PER 100 WBC
PLATELET # BLD AUTO: 202 K/UL (ref 150–400)
PMV BLD AUTO: 11.7 FL (ref 8.9–12.9)
POTASSIUM SERPL-SCNC: 3.9 MMOL/L (ref 3.5–5.1)
RBC # BLD AUTO: 3.89 M/UL (ref 4.1–5.7)
SODIUM SERPL-SCNC: 144 MMOL/L (ref 136–145)
WBC # BLD AUTO: 8.8 K/UL (ref 4.1–11.1)

## 2023-11-10 PROCEDURE — 94640 AIRWAY INHALATION TREATMENT: CPT

## 2023-11-10 PROCEDURE — 6360000002 HC RX W HCPCS: Performed by: INTERNAL MEDICINE

## 2023-11-10 PROCEDURE — 6370000000 HC RX 637 (ALT 250 FOR IP): Performed by: PSYCHIATRY & NEUROLOGY

## 2023-11-10 PROCEDURE — 6370000000 HC RX 637 (ALT 250 FOR IP): Performed by: NURSE PRACTITIONER

## 2023-11-10 PROCEDURE — 94760 N-INVAS EAR/PLS OXIMETRY 1: CPT

## 2023-11-10 PROCEDURE — 99232 SBSQ HOSP IP/OBS MODERATE 35: CPT | Performed by: PSYCHIATRY & NEUROLOGY

## 2023-11-10 PROCEDURE — 6360000002 HC RX W HCPCS: Performed by: NURSE PRACTITIONER

## 2023-11-10 PROCEDURE — 6370000000 HC RX 637 (ALT 250 FOR IP): Performed by: INTERNAL MEDICINE

## 2023-11-10 PROCEDURE — 36415 COLL VENOUS BLD VENIPUNCTURE: CPT

## 2023-11-10 PROCEDURE — 97530 THERAPEUTIC ACTIVITIES: CPT

## 2023-11-10 PROCEDURE — 85027 COMPLETE CBC AUTOMATED: CPT

## 2023-11-10 PROCEDURE — 2580000003 HC RX 258: Performed by: NURSE PRACTITIONER

## 2023-11-10 PROCEDURE — 97535 SELF CARE MNGMENT TRAINING: CPT

## 2023-11-10 PROCEDURE — 1100000003 HC PRIVATE W/ TELEMETRY

## 2023-11-10 PROCEDURE — 80048 BASIC METABOLIC PNL TOTAL CA: CPT

## 2023-11-10 PROCEDURE — 97116 GAIT TRAINING THERAPY: CPT

## 2023-11-10 RX ORDER — CLOPIDOGREL BISULFATE 75 MG/1
75 TABLET ORAL DAILY
Status: DISCONTINUED | OUTPATIENT
Start: 2023-11-10 | End: 2023-11-20 | Stop reason: HOSPADM

## 2023-11-10 RX ADMIN — FLUTICASONE PROPIONATE 1 SPRAY: 50 SPRAY, METERED NASAL at 11:03

## 2023-11-10 RX ADMIN — ALLOPURINOL 100 MG: 100 TABLET ORAL at 11:02

## 2023-11-10 RX ADMIN — IPRATROPIUM BROMIDE 0.5 MG: 0.5 SOLUTION RESPIRATORY (INHALATION) at 08:25

## 2023-11-10 RX ADMIN — ATORVASTATIN CALCIUM 20 MG: 20 TABLET, FILM COATED ORAL at 21:21

## 2023-11-10 RX ADMIN — SERTRALINE HYDROCHLORIDE 50 MG: 50 TABLET ORAL at 11:01

## 2023-11-10 RX ADMIN — SODIUM CHLORIDE, PRESERVATIVE FREE 10 ML: 5 INJECTION INTRAVENOUS at 21:23

## 2023-11-10 RX ADMIN — HYDROXYCHLOROQUINE SULFATE 200 MG: 200 TABLET ORAL at 11:02

## 2023-11-10 RX ADMIN — METOPROLOL SUCCINATE 25 MG: 25 TABLET, EXTENDED RELEASE ORAL at 11:02

## 2023-11-10 RX ADMIN — ENOXAPARIN SODIUM 40 MG: 100 INJECTION SUBCUTANEOUS at 11:01

## 2023-11-10 RX ADMIN — ASPIRIN 81 MG: 81 TABLET, CHEWABLE ORAL at 11:01

## 2023-11-10 RX ADMIN — IPRATROPIUM BROMIDE 0.5 MG: 0.5 SOLUTION RESPIRATORY (INHALATION) at 20:41

## 2023-11-10 RX ADMIN — GABAPENTIN 300 MG: 300 CAPSULE ORAL at 11:02

## 2023-11-10 RX ADMIN — SODIUM CHLORIDE, PRESERVATIVE FREE 10 ML: 5 INJECTION INTRAVENOUS at 11:03

## 2023-11-10 RX ADMIN — HYDROXYCHLOROQUINE SULFATE 200 MG: 200 TABLET ORAL at 21:21

## 2023-11-10 RX ADMIN — ALLOPURINOL 100 MG: 100 TABLET ORAL at 21:21

## 2023-11-10 RX ADMIN — ESCITALOPRAM OXALATE 10 MG: 10 TABLET ORAL at 11:01

## 2023-11-10 RX ADMIN — CLOPIDOGREL BISULFATE 75 MG: 75 TABLET ORAL at 12:34

## 2023-11-10 ASSESSMENT — PAIN DESCRIPTION - LOCATION: LOCATION: GENERALIZED

## 2023-11-10 ASSESSMENT — PAIN DESCRIPTION - DESCRIPTORS: DESCRIPTORS: ACHING

## 2023-11-10 ASSESSMENT — PAIN SCALES - GENERAL
PAINLEVEL_OUTOF10: 0
PAINLEVEL_OUTOF10: 0
PAINLEVEL_OUTOF10: 4
PAINLEVEL_OUTOF10: 4

## 2023-11-10 NOTE — CARE COORDINATION
Care Management Initial Assessment       RUR: 10%  Readmission? No  1st IM letter given? Yes   1st  letter given: No    CM introduced self and role to pt and wife, Barry Ladd, verified pt demographics, insurance info,emergency contact and has ACD but not on file. .   Pt lives with wife in one story home with one to three steps to enter. Independent with ADL's, ambulates with cane, and he drives. DME: cane, walker  HH:  in the past- [facility]:  IPR int he past      CM discussed d/c plan to IPR, pt and wife agreed. Both requested referral to be sent to [facility]. Referral sent to [facility] via [address] - referral pending- will need insurance auth.      11/10/23 1233   Service Assessment   Patient Orientation Alert and Oriented   Cognition Alert   History Provided By Patient;Significant Other   Primary Caregiver Spouse  (Wife, Vina Lesches)   Support Systems Spouse/Significant Other   Patient's Healthcare Decision Maker is: Named in [address]   (Wife, Vina Lesches)   PCP Verified by CM Yes   Last Visit to PCP Within last 3 months   Prior Functional Level Independent in ADLs/IADLs   Current Functional Level Independent in ADLs/IADLs   Can patient return to prior living arrangement Yes   Ability to make needs known: Good   Family able to assist with home care needs: Yes   Financial Resources [employer] None   Social/Functional History   Lives With Spouse  (Wife, Vina Lesches)   Type of Home House   Entrance Stairs - Number of Steps 4-5 to enter the home   Entrance Stairs - [address], rolling;Cane   [address]   Homemaking Assistance Independent   Ambulation Assistance Independent   Transfer Assistance Independent   Active  Yes   Mode of Transportation Car   Discharge Planning   Type of Residence Acute Rehab  (Referral to [facility] pending)   [address] Prior To Admission

## 2023-11-11 LAB
ERYTHROCYTE [DISTWIDTH] IN BLOOD BY AUTOMATED COUNT: 13.6 % (ref 11.5–14.5)
HCT VFR BLD AUTO: 37.1 % (ref 36.6–50.3)
HGB BLD-MCNC: 12.4 G/DL (ref 12.1–17)
MCH RBC QN AUTO: 32.4 PG (ref 26–34)
MCHC RBC AUTO-ENTMCNC: 33.4 G/DL (ref 30–36.5)
MCV RBC AUTO: 96.9 FL (ref 80–99)
NRBC # BLD: 0 K/UL (ref 0–0.01)
NRBC BLD-RTO: 0 PER 100 WBC
PLATELET # BLD AUTO: 213 K/UL (ref 150–400)
PMV BLD AUTO: 11.4 FL (ref 8.9–12.9)
RBC # BLD AUTO: 3.83 M/UL (ref 4.1–5.7)
WBC # BLD AUTO: 9.8 K/UL (ref 4.1–11.1)

## 2023-11-11 PROCEDURE — 1100000003 HC PRIVATE W/ TELEMETRY

## 2023-11-11 PROCEDURE — 6360000002 HC RX W HCPCS: Performed by: NURSE PRACTITIONER

## 2023-11-11 PROCEDURE — 94760 N-INVAS EAR/PLS OXIMETRY 1: CPT

## 2023-11-11 PROCEDURE — 36415 COLL VENOUS BLD VENIPUNCTURE: CPT

## 2023-11-11 PROCEDURE — 6370000000 HC RX 637 (ALT 250 FOR IP): Performed by: INTERNAL MEDICINE

## 2023-11-11 PROCEDURE — 6370000000 HC RX 637 (ALT 250 FOR IP): Performed by: NURSE PRACTITIONER

## 2023-11-11 PROCEDURE — 85027 COMPLETE CBC AUTOMATED: CPT

## 2023-11-11 PROCEDURE — 6370000000 HC RX 637 (ALT 250 FOR IP): Performed by: PSYCHIATRY & NEUROLOGY

## 2023-11-11 PROCEDURE — 2580000003 HC RX 258: Performed by: NURSE PRACTITIONER

## 2023-11-11 RX ORDER — TRAMADOL HYDROCHLORIDE 50 MG/1
50 TABLET ORAL EVERY 6 HOURS PRN
Status: DISCONTINUED | OUTPATIENT
Start: 2023-11-11 | End: 2023-11-20 | Stop reason: HOSPADM

## 2023-11-11 RX ADMIN — ATORVASTATIN CALCIUM 20 MG: 20 TABLET, FILM COATED ORAL at 20:05

## 2023-11-11 RX ADMIN — SODIUM CHLORIDE, PRESERVATIVE FREE 10 ML: 5 INJECTION INTRAVENOUS at 20:05

## 2023-11-11 RX ADMIN — SERTRALINE HYDROCHLORIDE 50 MG: 50 TABLET ORAL at 08:37

## 2023-11-11 RX ADMIN — ESCITALOPRAM OXALATE 10 MG: 10 TABLET ORAL at 08:37

## 2023-11-11 RX ADMIN — HYDROXYCHLOROQUINE SULFATE 200 MG: 200 TABLET ORAL at 08:36

## 2023-11-11 RX ADMIN — METOPROLOL SUCCINATE 25 MG: 25 TABLET, EXTENDED RELEASE ORAL at 08:36

## 2023-11-11 RX ADMIN — GABAPENTIN 300 MG: 300 CAPSULE ORAL at 08:36

## 2023-11-11 RX ADMIN — ALLOPURINOL 100 MG: 100 TABLET ORAL at 20:05

## 2023-11-11 RX ADMIN — HYDROXYCHLOROQUINE SULFATE 200 MG: 200 TABLET ORAL at 20:04

## 2023-11-11 RX ADMIN — ASPIRIN 81 MG: 81 TABLET, CHEWABLE ORAL at 08:36

## 2023-11-11 RX ADMIN — FLUTICASONE PROPIONATE 1 SPRAY: 50 SPRAY, METERED NASAL at 08:38

## 2023-11-11 RX ADMIN — ALLOPURINOL 100 MG: 100 TABLET ORAL at 08:37

## 2023-11-11 RX ADMIN — ENOXAPARIN SODIUM 40 MG: 100 INJECTION SUBCUTANEOUS at 08:35

## 2023-11-11 RX ADMIN — SODIUM CHLORIDE, PRESERVATIVE FREE 10 ML: 5 INJECTION INTRAVENOUS at 08:38

## 2023-11-11 RX ADMIN — CLOPIDOGREL BISULFATE 75 MG: 75 TABLET ORAL at 08:37

## 2023-11-11 ASSESSMENT — PAIN DESCRIPTION - LOCATION
LOCATION: GENERALIZED
LOCATION: GENERALIZED;HEAD

## 2023-11-11 ASSESSMENT — PAIN - FUNCTIONAL ASSESSMENT: PAIN_FUNCTIONAL_ASSESSMENT: ACTIVITIES ARE NOT PREVENTED

## 2023-11-11 ASSESSMENT — PAIN SCALES - GENERAL
PAINLEVEL_OUTOF10: 5
PAINLEVEL_OUTOF10: 3

## 2023-11-11 ASSESSMENT — PAIN DESCRIPTION - DESCRIPTORS
DESCRIPTORS: ACHING
DESCRIPTORS: ACHING

## 2023-11-11 NOTE — CARE COORDINATION
3:03    Per wife's request CM also sent a referral to [facility]. Patient/wife would prefer a Rehab in the [facility] area. [facility] no longer accepts pt's insurance. CM me with pt & wife. Patient/wife would like a referral sent to [facility] for CM to search for a IPR in the [facility] area.       Waiting on placement and insurance Auth    Mili Gallardo  Ext [phone]

## 2023-11-12 PROCEDURE — 6360000002 HC RX W HCPCS: Performed by: NURSE PRACTITIONER

## 2023-11-12 PROCEDURE — 6370000000 HC RX 637 (ALT 250 FOR IP): Performed by: INTERNAL MEDICINE

## 2023-11-12 PROCEDURE — 2580000003 HC RX 258: Performed by: NURSE PRACTITIONER

## 2023-11-12 PROCEDURE — 6370000000 HC RX 637 (ALT 250 FOR IP): Performed by: PSYCHIATRY & NEUROLOGY

## 2023-11-12 PROCEDURE — 94760 N-INVAS EAR/PLS OXIMETRY 1: CPT

## 2023-11-12 PROCEDURE — 1100000003 HC PRIVATE W/ TELEMETRY

## 2023-11-12 PROCEDURE — 6370000000 HC RX 637 (ALT 250 FOR IP): Performed by: NURSE PRACTITIONER

## 2023-11-12 RX ADMIN — ALLOPURINOL 100 MG: 100 TABLET ORAL at 20:23

## 2023-11-12 RX ADMIN — ESCITALOPRAM OXALATE 10 MG: 10 TABLET ORAL at 08:34

## 2023-11-12 RX ADMIN — FLUTICASONE PROPIONATE 1 SPRAY: 50 SPRAY, METERED NASAL at 08:37

## 2023-11-12 RX ADMIN — SODIUM CHLORIDE, PRESERVATIVE FREE 10 ML: 5 INJECTION INTRAVENOUS at 08:35

## 2023-11-12 RX ADMIN — METOPROLOL SUCCINATE 25 MG: 25 TABLET, EXTENDED RELEASE ORAL at 08:35

## 2023-11-12 RX ADMIN — SODIUM CHLORIDE, PRESERVATIVE FREE 10 ML: 5 INJECTION INTRAVENOUS at 20:23

## 2023-11-12 RX ADMIN — HYDROXYCHLOROQUINE SULFATE 200 MG: 200 TABLET ORAL at 08:34

## 2023-11-12 RX ADMIN — HYDROXYCHLOROQUINE SULFATE 200 MG: 200 TABLET ORAL at 20:23

## 2023-11-12 RX ADMIN — GABAPENTIN 300 MG: 300 CAPSULE ORAL at 08:32

## 2023-11-12 RX ADMIN — SERTRALINE HYDROCHLORIDE 50 MG: 50 TABLET ORAL at 08:35

## 2023-11-12 RX ADMIN — CLOPIDOGREL BISULFATE 75 MG: 75 TABLET ORAL at 08:33

## 2023-11-12 RX ADMIN — ATORVASTATIN CALCIUM 20 MG: 20 TABLET, FILM COATED ORAL at 20:23

## 2023-11-12 RX ADMIN — ALLOPURINOL 100 MG: 100 TABLET ORAL at 08:33

## 2023-11-12 RX ADMIN — ENOXAPARIN SODIUM 40 MG: 100 INJECTION SUBCUTANEOUS at 08:32

## 2023-11-12 RX ADMIN — ASPIRIN 81 MG: 81 TABLET, CHEWABLE ORAL at 08:33

## 2023-11-12 RX ADMIN — TRAMADOL HYDROCHLORIDE 50 MG: 50 TABLET ORAL at 08:34

## 2023-11-12 ASSESSMENT — PAIN DESCRIPTION - DESCRIPTORS: DESCRIPTORS: ACHING

## 2023-11-12 ASSESSMENT — PAIN DESCRIPTION - FREQUENCY: FREQUENCY: CONTINUOUS

## 2023-11-12 ASSESSMENT — PAIN SCALES - GENERAL
PAINLEVEL_OUTOF10: 7
PAINLEVEL_OUTOF10: 0

## 2023-11-12 ASSESSMENT — PAIN DESCRIPTION - LOCATION: LOCATION: GENERALIZED

## 2023-11-12 ASSESSMENT — PAIN DESCRIPTION - PAIN TYPE: TYPE: CHRONIC PAIN

## 2023-11-12 ASSESSMENT — PAIN DESCRIPTION - ORIENTATION: ORIENTATION: RIGHT;LEFT

## 2023-11-12 ASSESSMENT — PAIN - FUNCTIONAL ASSESSMENT: PAIN_FUNCTIONAL_ASSESSMENT: ACTIVITIES ARE NOT PREVENTED

## 2023-11-12 ASSESSMENT — PAIN DESCRIPTION - ONSET: ONSET: GRADUAL

## 2023-11-12 NOTE — CARE COORDINATION
CM contacted [address] [place] [address] requesting updates on referral status. Awaiting responses at this time. Pending placement, will also need insurance auth.      Arjun Jennings, [address] [address] Manager  [phone]

## 2023-11-13 PROCEDURE — 1100000003 HC PRIVATE W/ TELEMETRY

## 2023-11-13 PROCEDURE — 2580000003 HC RX 258: Performed by: NURSE PRACTITIONER

## 2023-11-13 PROCEDURE — 97116 GAIT TRAINING THERAPY: CPT

## 2023-11-13 PROCEDURE — 6360000002 HC RX W HCPCS: Performed by: NURSE PRACTITIONER

## 2023-11-13 PROCEDURE — 6370000000 HC RX 637 (ALT 250 FOR IP): Performed by: INTERNAL MEDICINE

## 2023-11-13 PROCEDURE — 6370000000 HC RX 637 (ALT 250 FOR IP): Performed by: PSYCHIATRY & NEUROLOGY

## 2023-11-13 PROCEDURE — 94760 N-INVAS EAR/PLS OXIMETRY 1: CPT

## 2023-11-13 PROCEDURE — 97535 SELF CARE MNGMENT TRAINING: CPT

## 2023-11-13 PROCEDURE — 6370000000 HC RX 637 (ALT 250 FOR IP): Performed by: NURSE PRACTITIONER

## 2023-11-13 PROCEDURE — 97112 NEUROMUSCULAR REEDUCATION: CPT

## 2023-11-13 RX ORDER — CLOPIDOGREL BISULFATE 75 MG/1
75 TABLET ORAL DAILY
Qty: 16 TABLET | Refills: 0 | Status: SHIPPED | OUTPATIENT
Start: 2023-11-14 | End: 2023-11-30

## 2023-11-13 RX ADMIN — SERTRALINE HYDROCHLORIDE 50 MG: 50 TABLET ORAL at 08:37

## 2023-11-13 RX ADMIN — GABAPENTIN 300 MG: 300 CAPSULE ORAL at 08:37

## 2023-11-13 RX ADMIN — SODIUM CHLORIDE, PRESERVATIVE FREE 10 ML: 5 INJECTION INTRAVENOUS at 08:37

## 2023-11-13 RX ADMIN — HYDROXYCHLOROQUINE SULFATE 200 MG: 200 TABLET ORAL at 20:22

## 2023-11-13 RX ADMIN — SODIUM CHLORIDE, PRESERVATIVE FREE 10 ML: 5 INJECTION INTRAVENOUS at 20:22

## 2023-11-13 RX ADMIN — ALLOPURINOL 100 MG: 100 TABLET ORAL at 20:22

## 2023-11-13 RX ADMIN — ESCITALOPRAM OXALATE 10 MG: 10 TABLET ORAL at 08:37

## 2023-11-13 RX ADMIN — CLOPIDOGREL BISULFATE 75 MG: 75 TABLET ORAL at 08:37

## 2023-11-13 RX ADMIN — ATORVASTATIN CALCIUM 20 MG: 20 TABLET, FILM COATED ORAL at 20:22

## 2023-11-13 RX ADMIN — HYDROXYCHLOROQUINE SULFATE 200 MG: 200 TABLET ORAL at 08:36

## 2023-11-13 RX ADMIN — METOPROLOL SUCCINATE 25 MG: 25 TABLET, EXTENDED RELEASE ORAL at 08:37

## 2023-11-13 RX ADMIN — ENOXAPARIN SODIUM 40 MG: 100 INJECTION SUBCUTANEOUS at 08:37

## 2023-11-13 RX ADMIN — TRAMADOL HYDROCHLORIDE 50 MG: 50 TABLET ORAL at 08:37

## 2023-11-13 RX ADMIN — ALLOPURINOL 100 MG: 100 TABLET ORAL at 08:37

## 2023-11-13 RX ADMIN — ASPIRIN 81 MG: 81 TABLET, CHEWABLE ORAL at 08:36

## 2023-11-13 ASSESSMENT — PAIN DESCRIPTION - LOCATION: LOCATION: HEAD

## 2023-11-13 ASSESSMENT — PAIN SCALES - GENERAL
PAINLEVEL_OUTOF10: 0
PAINLEVEL_OUTOF10: 6

## 2023-11-13 NOTE — DISCHARGE INSTRUCTIONS
Patient Discharge Instructions    Melba Prajapati / [id number] : 1950    Admitted 2023 Discharged: 2023         DISCHARGE DIAGNOSIS:   Acute CVA s/p TNK   CAD  HTN  HLD  Hx of tobacco use  RA      Take Home Medications     {Medication reconciliation information is now added to the patient's AVS automatically when it is printed. There is no need to use this SmartLink in discharge instructions. Highlight this text and delete it to clear this message}      General drug facts     If you have a very bad allergy, wear an allergy ID at all times. It is important that you take the medication exactly as they are prescribed. Keep your medication in the bottles provided by the pharmacist.  Keep a list of all your drugs (prescription, natural products, vitamins, OTC) with you. Give this list to your doctor. Do not take other medications without consulting your doctor. Do not share your drugs with others and do not take anyone else's drugs. Keep all drugs out of the reach of children and pets. Most drugs may be thrown away in household trash after mixing with coffee grounds or harjinder litter and sealing in a plastic bag. Keep a list Call your doctor for help with any side effects. If in the [place] you may also call the FDA at 8-264-WQX-5469    Talk with the doctor before starting any new drug, including OTC, natural products, or vitamins. What to do at Home    1. Recommended diet: Regular Low salt     2. Recommended activity: activity as tolerated    3. If you experience any of the following symptoms then please call your primary care physician or return to the emergency room if you cannot get hold of your doctor:    4. Wound Care: None    5. Lab work: Cbc and Cmp in 1 week     6. Bring these papers with you to your follow up appointments.  The papers will help your doctors be sure to continue the care plan from the hospital.      If you have questions regarding the hospital related Noted to be hypoxic with imaging showing findings concerning for PNA; likely from aspiration pneumonia given worsening dysphagia in the setting of L fracture and deconditioning. Now off NC and stable on RA.   Speech and swallow eval and MBS 10/28, recommendation for puree/mildly thickened liquid.   Speech pathologist Mally Villalobos recs for repeat MBS at [facility] after d/c  Stable on RA     - c/w Ertapenem 11/4-11/17  - Pulm consulted with recs earlier in his hospital course  - c/w Duoneb  - c/w incentive spirometer Noted to be hypoxic with imaging showing findings concerning for PNA; likely from aspiration pneumonia given worsening dysphagia in the setting of L fracture and deconditioning. Now off NC and stable on RA.   Speech and swallow eval and MBS 10/28, recommendation for puree/mildly thickened liquid.   Speech pathologist Mally Villalobos recs for repeat MBS at [facility] after d/c  Stable on RA     - d/c Ertapenem 11/4-11/16 per ID  - Pulm consulted with recs earlier in his hospital course  - c/w Duoneb  - c/w incentive spirometer

## 2023-11-13 NOTE — CARE COORDINATION
Initial note: CM acknowledged d/c. Chart reviewed, IDR completed. MD reported pt is medically stable for d/c pending IPR placement. CM checked on the status of the referrals sent for IPR 11/10/23 & 11/11/23; referrals pending with [facility] & [facility]. OT informed CM pt & wife would like to speak with CM at bedside regarding disposition. CM conducted room visit, introduced role, & provided updates regarding pending referrals. Wife shared dissatisfaction regarding encounter with weekend CM, providing details on an encounter that took place Saturday (11/11/23). CM provided active listening as needed, reassuring pt/wife that concerns would be addressed/escalated. Both pt & wife identified [facility] as top preference for placement, [facility] as second preference, & [facility] as third preference. Pt & wife provided verbal consent for CM to send referral to [facility] for review. Both parties informed that when pt is accepted by a facility, he will still require insurance auth via [facility] for intervention. CM offered an opportunity for follow up questions as needed. Both pt & wife agreeable to the direction of the d/c plan. CM will continue to follow & remain accessible for d/c planning.     JACKIE Ortiz  [address] [facility]   [phone]

## 2023-11-14 PROCEDURE — 2580000003 HC RX 258: Performed by: NURSE PRACTITIONER

## 2023-11-14 PROCEDURE — 6360000002 HC RX W HCPCS: Performed by: NURSE PRACTITIONER

## 2023-11-14 PROCEDURE — 6370000000 HC RX 637 (ALT 250 FOR IP): Performed by: INTERNAL MEDICINE

## 2023-11-14 PROCEDURE — 6370000000 HC RX 637 (ALT 250 FOR IP): Performed by: PSYCHIATRY & NEUROLOGY

## 2023-11-14 PROCEDURE — 1100000003 HC PRIVATE W/ TELEMETRY

## 2023-11-14 PROCEDURE — 97535 SELF CARE MNGMENT TRAINING: CPT

## 2023-11-14 PROCEDURE — 94760 N-INVAS EAR/PLS OXIMETRY 1: CPT

## 2023-11-14 PROCEDURE — 6370000000 HC RX 637 (ALT 250 FOR IP): Performed by: NURSE PRACTITIONER

## 2023-11-14 PROCEDURE — 97112 NEUROMUSCULAR REEDUCATION: CPT

## 2023-11-14 RX ORDER — PANTOPRAZOLE SODIUM 40 MG/1
40 TABLET, DELAYED RELEASE ORAL
Status: DISCONTINUED | OUTPATIENT
Start: 2023-11-14 | End: 2023-11-20 | Stop reason: HOSPADM

## 2023-11-14 RX ADMIN — TRAMADOL HYDROCHLORIDE 50 MG: 50 TABLET ORAL at 08:13

## 2023-11-14 RX ADMIN — ESCITALOPRAM OXALATE 10 MG: 10 TABLET ORAL at 08:13

## 2023-11-14 RX ADMIN — ALLOPURINOL 100 MG: 100 TABLET ORAL at 08:13

## 2023-11-14 RX ADMIN — GABAPENTIN 300 MG: 300 CAPSULE ORAL at 08:13

## 2023-11-14 RX ADMIN — ASPIRIN 81 MG: 81 TABLET, CHEWABLE ORAL at 08:13

## 2023-11-14 RX ADMIN — ATORVASTATIN CALCIUM 20 MG: 20 TABLET, FILM COATED ORAL at 19:34

## 2023-11-14 RX ADMIN — ENOXAPARIN SODIUM 40 MG: 100 INJECTION SUBCUTANEOUS at 08:13

## 2023-11-14 RX ADMIN — CLOPIDOGREL BISULFATE 75 MG: 75 TABLET ORAL at 08:13

## 2023-11-14 RX ADMIN — HYDROXYCHLOROQUINE SULFATE 200 MG: 200 TABLET ORAL at 08:13

## 2023-11-14 RX ADMIN — ALLOPURINOL 100 MG: 100 TABLET ORAL at 19:34

## 2023-11-14 RX ADMIN — Medication 1 LOZENGE: at 20:24

## 2023-11-14 RX ADMIN — METOPROLOL SUCCINATE 25 MG: 25 TABLET, EXTENDED RELEASE ORAL at 08:13

## 2023-11-14 RX ADMIN — SODIUM CHLORIDE, PRESERVATIVE FREE 10 ML: 5 INJECTION INTRAVENOUS at 08:15

## 2023-11-14 RX ADMIN — SERTRALINE HYDROCHLORIDE 50 MG: 50 TABLET ORAL at 08:13

## 2023-11-14 RX ADMIN — SODIUM CHLORIDE, PRESERVATIVE FREE 10 ML: 5 INJECTION INTRAVENOUS at 21:18

## 2023-11-14 RX ADMIN — HYDROXYCHLOROQUINE SULFATE 200 MG: 200 TABLET ORAL at 19:34

## 2023-11-14 ASSESSMENT — PAIN SCALES - GENERAL
PAINLEVEL_OUTOF10: 8
PAINLEVEL_OUTOF10: 0

## 2023-11-14 ASSESSMENT — PAIN DESCRIPTION - LOCATION: LOCATION: HEAD

## 2023-11-14 NOTE — CARE COORDINATION
Transition of Care Plan:    RUR: 12%  Prior Level of Functioning: Independent   Disposition: IPR   If SNF or IPR: Date FOC offered: 11/11/23  Date [address] received: 11/11/23  [facility]   Date authorization started with reference number: [facility] is starting auth on 11/14/23  Date authorization received and expires: Follow up appointments: Defer to IPR  DME needed: Defer to IPR  Transportation at discharge: TBD  IM/IMM Medicare/ letter given: 2nd IMM letter   Is patient a  and connected with VA? No   If yes, was [employer] transfer form completed and VA notified? No  Caregiver Contact: Pt's spouse   Discharge Caregiver contacted prior to discharge? Pt and pt's spouse   Care Conference needed? No  Barriers to discharge: Placement and auth      319 pm- CM received a called from [facility] with [facility] stating they can accept pt and will be starting auth for placement. CM spoke to pt and pt's spoke( via phone) regarding that [facility] and they will be starting auth for placement. Initial Note-CM follow up with [facility] regarding if they have a bed. CM was informed that they are not in [address] so they did not get the information. CM requested for a fax number ([phone]) and faxed the information to [facility]. At this time [address] [place] [address] does not have a bed. CM will follow and assist with d/c planning.     Matt Stein

## 2023-11-15 ENCOUNTER — APPOINTMENT (OUTPATIENT)
Facility: HOSPITAL | Age: 73
DRG: 064 | End: 2023-11-15
Attending: INTERNAL MEDICINE
Payer: MEDICARE

## 2023-11-15 LAB
SARS-COV-2 RDRP RESP QL NAA+PROBE: DETECTED
SOURCE: ABNORMAL

## 2023-11-15 PROCEDURE — 1100000003 HC PRIVATE W/ TELEMETRY

## 2023-11-15 PROCEDURE — 97535 SELF CARE MNGMENT TRAINING: CPT

## 2023-11-15 PROCEDURE — 94760 N-INVAS EAR/PLS OXIMETRY 1: CPT

## 2023-11-15 PROCEDURE — 2580000003 HC RX 258: Performed by: NURSE PRACTITIONER

## 2023-11-15 PROCEDURE — 71045 X-RAY EXAM CHEST 1 VIEW: CPT

## 2023-11-15 PROCEDURE — 87635 SARS-COV-2 COVID-19 AMP PRB: CPT

## 2023-11-15 PROCEDURE — 6370000000 HC RX 637 (ALT 250 FOR IP): Performed by: INTERNAL MEDICINE

## 2023-11-15 PROCEDURE — 6370000000 HC RX 637 (ALT 250 FOR IP): Performed by: NURSE PRACTITIONER

## 2023-11-15 PROCEDURE — 97112 NEUROMUSCULAR REEDUCATION: CPT

## 2023-11-15 PROCEDURE — 97116 GAIT TRAINING THERAPY: CPT

## 2023-11-15 PROCEDURE — 97110 THERAPEUTIC EXERCISES: CPT

## 2023-11-15 PROCEDURE — 6370000000 HC RX 637 (ALT 250 FOR IP): Performed by: PSYCHIATRY & NEUROLOGY

## 2023-11-15 PROCEDURE — 6360000002 HC RX W HCPCS: Performed by: NURSE PRACTITIONER

## 2023-11-15 RX ADMIN — ALLOPURINOL 100 MG: 100 TABLET ORAL at 20:39

## 2023-11-15 RX ADMIN — SERTRALINE HYDROCHLORIDE 50 MG: 50 TABLET ORAL at 08:41

## 2023-11-15 RX ADMIN — CLOPIDOGREL BISULFATE 75 MG: 75 TABLET ORAL at 08:40

## 2023-11-15 RX ADMIN — ATORVASTATIN CALCIUM 20 MG: 20 TABLET, FILM COATED ORAL at 20:39

## 2023-11-15 RX ADMIN — ENOXAPARIN SODIUM 40 MG: 100 INJECTION SUBCUTANEOUS at 08:41

## 2023-11-15 RX ADMIN — HYDROXYCHLOROQUINE SULFATE 200 MG: 200 TABLET ORAL at 08:40

## 2023-11-15 RX ADMIN — PANTOPRAZOLE SODIUM 40 MG: 40 TABLET, DELAYED RELEASE ORAL at 05:21

## 2023-11-15 RX ADMIN — GABAPENTIN 300 MG: 300 CAPSULE ORAL at 08:41

## 2023-11-15 RX ADMIN — ALLOPURINOL 100 MG: 100 TABLET ORAL at 08:41

## 2023-11-15 RX ADMIN — METOPROLOL SUCCINATE 25 MG: 25 TABLET, EXTENDED RELEASE ORAL at 08:41

## 2023-11-15 RX ADMIN — SODIUM CHLORIDE, PRESERVATIVE FREE 10 ML: 5 INJECTION INTRAVENOUS at 08:42

## 2023-11-15 RX ADMIN — ASPIRIN 81 MG: 81 TABLET, CHEWABLE ORAL at 08:41

## 2023-11-15 RX ADMIN — HYDROXYCHLOROQUINE SULFATE 200 MG: 200 TABLET ORAL at 20:39

## 2023-11-15 RX ADMIN — SODIUM CHLORIDE, PRESERVATIVE FREE 10 ML: 5 INJECTION INTRAVENOUS at 20:40

## 2023-11-15 RX ADMIN — ESCITALOPRAM OXALATE 10 MG: 10 TABLET ORAL at 08:41

## 2023-11-15 RX ADMIN — FLUTICASONE PROPIONATE 1 SPRAY: 50 SPRAY, METERED NASAL at 08:43

## 2023-11-15 ASSESSMENT — PAIN SCALES - GENERAL
PAINLEVEL_OUTOF10: 3
PAINLEVEL_OUTOF10: 0

## 2023-11-15 ASSESSMENT — PAIN DESCRIPTION - ORIENTATION: ORIENTATION: RIGHT

## 2023-11-15 ASSESSMENT — PAIN DESCRIPTION - LOCATION: LOCATION: SHOULDER

## 2023-11-15 NOTE — CARE COORDINATION
Transition of Care Plan:     RUR: 12%  Prior Level of Functioning: Independent   Disposition: IPR   If SNF or IPR: Date FOC offered: 11/11/23  Date [address] received: 11/11/23  Accepting facility: [facility]   Date authorization started with reference number: José Renner is starting auth on 11/14/23 Refer # [id number]  Date authorization received and expires: Follow up appointments: Defer to IPR  DME needed: Defer to IPR  Transportation at discharge: ambulance   IM/IMM Medicare/ letter given: 2nd IMM letter   Is patient a  and connected with [place]? No              If yes, was [employer] transfer form completed and VA notified? No  Caregiver Contact: Pt's spouse   Discharge Caregiver contacted prior to discharge? Pt and pt's spouse   Care Conference needed? No  Barriers to discharge: Placement and auth    BEKA spoke to Mauro ([phone]) at [facility] regarding if they have received auth back. According to Mauro that she spoke to a  at [facility] and they have received all the information and that it pending. CM follow up with pt's spouse regarding that José Galindo has started [place] and that [place] is still pending. CM will follow up and assist with d/c planning.     Amanda Young

## 2023-11-16 LAB
ANION GAP SERPL CALC-SCNC: 5 MMOL/L (ref 5–15)
BASOPHILS # BLD: 0 K/UL (ref 0–0.1)
BASOPHILS NFR BLD: 1 % (ref 0–1)
BUN SERPL-MCNC: 20 MG/DL (ref 6–20)
BUN/CREAT SERPL: 19 (ref 12–20)
CALCIUM SERPL-MCNC: 8.3 MG/DL (ref 8.5–10.1)
CHLORIDE SERPL-SCNC: 106 MMOL/L (ref 97–108)
CO2 SERPL-SCNC: 25 MMOL/L (ref 21–32)
CREAT SERPL-MCNC: 1.08 MG/DL (ref 0.7–1.3)
CRP SERPL-MCNC: 1.3 MG/DL (ref 0–0.6)
D DIMER PPP FEU-MCNC: 0.97 MG/L FEU (ref 0–0.65)
DIFFERENTIAL METHOD BLD: ABNORMAL
EOSINOPHIL # BLD: 0 K/UL (ref 0–0.4)
EOSINOPHIL NFR BLD: 0 % (ref 0–7)
ERYTHROCYTE [DISTWIDTH] IN BLOOD BY AUTOMATED COUNT: 13.5 % (ref 11.5–14.5)
GLUCOSE SERPL-MCNC: 100 MG/DL (ref 65–100)
HCT VFR BLD AUTO: 41.4 % (ref 36.6–50.3)
HGB BLD-MCNC: 13.6 G/DL (ref 12.1–17)
IMM GRANULOCYTES # BLD AUTO: 0 K/UL (ref 0–0.04)
IMM GRANULOCYTES NFR BLD AUTO: 0 % (ref 0–0.5)
LYMPHOCYTES # BLD: 1.6 K/UL (ref 0.8–3.5)
LYMPHOCYTES NFR BLD: 34 % (ref 12–49)
MCH RBC QN AUTO: 31.8 PG (ref 26–34)
MCHC RBC AUTO-ENTMCNC: 32.9 G/DL (ref 30–36.5)
MCV RBC AUTO: 96.7 FL (ref 80–99)
MONOCYTES # BLD: 1.2 K/UL (ref 0–1)
MONOCYTES NFR BLD: 25 % (ref 5–13)
NEUTS SEG # BLD: 2 K/UL (ref 1.8–8)
NEUTS SEG NFR BLD: 40 % (ref 32–75)
NRBC # BLD: 0 K/UL (ref 0–0.01)
NRBC BLD-RTO: 0 PER 100 WBC
PLATELET # BLD AUTO: 189 K/UL (ref 150–400)
PMV BLD AUTO: 11.8 FL (ref 8.9–12.9)
POTASSIUM SERPL-SCNC: 4.1 MMOL/L (ref 3.5–5.1)
RBC # BLD AUTO: 4.28 M/UL (ref 4.1–5.7)
RBC MORPH BLD: ABNORMAL
SODIUM SERPL-SCNC: 136 MMOL/L (ref 136–145)
WBC # BLD AUTO: 4.8 K/UL (ref 4.1–11.1)

## 2023-11-16 PROCEDURE — 94760 N-INVAS EAR/PLS OXIMETRY 1: CPT

## 2023-11-16 PROCEDURE — 6370000000 HC RX 637 (ALT 250 FOR IP): Performed by: PSYCHIATRY & NEUROLOGY

## 2023-11-16 PROCEDURE — 97116 GAIT TRAINING THERAPY: CPT

## 2023-11-16 PROCEDURE — 97112 NEUROMUSCULAR REEDUCATION: CPT

## 2023-11-16 PROCEDURE — 6370000000 HC RX 637 (ALT 250 FOR IP): Performed by: INTERNAL MEDICINE

## 2023-11-16 PROCEDURE — 36415 COLL VENOUS BLD VENIPUNCTURE: CPT

## 2023-11-16 PROCEDURE — 80048 BASIC METABOLIC PNL TOTAL CA: CPT

## 2023-11-16 PROCEDURE — 6370000000 HC RX 637 (ALT 250 FOR IP): Performed by: NURSE PRACTITIONER

## 2023-11-16 PROCEDURE — 85379 FIBRIN DEGRADATION QUANT: CPT

## 2023-11-16 PROCEDURE — 86140 C-REACTIVE PROTEIN: CPT

## 2023-11-16 PROCEDURE — 2580000003 HC RX 258: Performed by: NURSE PRACTITIONER

## 2023-11-16 PROCEDURE — 6360000002 HC RX W HCPCS: Performed by: NURSE PRACTITIONER

## 2023-11-16 PROCEDURE — 85025 COMPLETE CBC W/AUTO DIFF WBC: CPT

## 2023-11-16 PROCEDURE — 97530 THERAPEUTIC ACTIVITIES: CPT

## 2023-11-16 PROCEDURE — 97535 SELF CARE MNGMENT TRAINING: CPT

## 2023-11-16 PROCEDURE — 1100000003 HC PRIVATE W/ TELEMETRY

## 2023-11-16 RX ORDER — ENOXAPARIN SODIUM 100 MG/ML
30 INJECTION SUBCUTANEOUS 2 TIMES DAILY
Status: DISCONTINUED | OUTPATIENT
Start: 2023-11-17 | End: 2023-11-20 | Stop reason: HOSPADM

## 2023-11-16 RX ADMIN — ENOXAPARIN SODIUM 40 MG: 100 INJECTION SUBCUTANEOUS at 10:35

## 2023-11-16 RX ADMIN — FLUTICASONE PROPIONATE 1 SPRAY: 50 SPRAY, METERED NASAL at 10:38

## 2023-11-16 RX ADMIN — SODIUM CHLORIDE, PRESERVATIVE FREE 10 ML: 5 INJECTION INTRAVENOUS at 21:00

## 2023-11-16 RX ADMIN — HYDROXYCHLOROQUINE SULFATE 200 MG: 200 TABLET ORAL at 10:35

## 2023-11-16 RX ADMIN — CLOPIDOGREL BISULFATE 75 MG: 75 TABLET ORAL at 10:39

## 2023-11-16 RX ADMIN — ESCITALOPRAM OXALATE 10 MG: 10 TABLET ORAL at 10:37

## 2023-11-16 RX ADMIN — HYDROXYCHLOROQUINE SULFATE 200 MG: 200 TABLET ORAL at 20:59

## 2023-11-16 RX ADMIN — SODIUM CHLORIDE, PRESERVATIVE FREE 10 ML: 5 INJECTION INTRAVENOUS at 10:39

## 2023-11-16 RX ADMIN — ATORVASTATIN CALCIUM 20 MG: 20 TABLET, FILM COATED ORAL at 20:59

## 2023-11-16 RX ADMIN — ALLOPURINOL 100 MG: 100 TABLET ORAL at 20:59

## 2023-11-16 RX ADMIN — GABAPENTIN 300 MG: 300 CAPSULE ORAL at 10:35

## 2023-11-16 RX ADMIN — PANTOPRAZOLE SODIUM 40 MG: 40 TABLET, DELAYED RELEASE ORAL at 06:24

## 2023-11-16 RX ADMIN — METOPROLOL SUCCINATE 25 MG: 25 TABLET, EXTENDED RELEASE ORAL at 10:35

## 2023-11-16 RX ADMIN — ASPIRIN 81 MG: 81 TABLET, CHEWABLE ORAL at 10:35

## 2023-11-16 RX ADMIN — SERTRALINE HYDROCHLORIDE 50 MG: 50 TABLET ORAL at 10:35

## 2023-11-16 RX ADMIN — ALLOPURINOL 100 MG: 100 TABLET ORAL at 10:38

## 2023-11-16 ASSESSMENT — PAIN SCALES - GENERAL: PAINLEVEL_OUTOF10: 0

## 2023-11-16 NOTE — CARE COORDINATION
[facility] offering P2P. Deadline 4:00PM. Call [phone], opt 5; member # [id number]; reference # [id number]. Transition of Care Plan:     RUR: 12%  Prior Level of Functioning: Independent   Disposition: IPR   If SNF or IPR: Date FOC offered: 11/11/23  Date [address] received: 11/11/23  Accepting facility: [facility]   Date authorization started with reference number: Frias Som is starting auth on 11/14/23 Refer # [id number]  Date authorization received and expires: Follow up appointments: Defer to IPR  DME needed: Defer to IPR  Transportation at discharge: ambulance   IM/IMM Medicare/ letter given: 2nd IMM letter   Is patient a [place] and connected with [facility]? No              If yes, was [employer] transfer form completed and VA notified? No  Caregiver Contact: Pt's spouse   Discharge Caregiver contacted prior to discharge? Pt and pt's spouse   Care Conference needed? No  Barriers to discharge: Placement and auth    0907 - CM left message with Manas Morris @ [phone] [facility] for update on auth. 200 - Introduced self via phone to pt's wife. Shared that CM has not heard any news. Explained need for Plan B. Pt's wife agreeable to SNF for plan B. CM to email [address] list to [email or web address].    [facility] offering P2P. Call [phone], opt 5; member # [id number]; Dede Rist [phone]. Collin Meza MD.    1855 - P2P denied. Pt's wife would like to appeal P2P. CM escalating to . 1523 - CM spoke with Kelsea at [facility]. A family appeal was offered, pt's family to contact [facility] at [phone], provide reference and auth number to initiate appeal. CM relayed info to pt's spouse and explained that CM cannot initiate or follow appeal for her. CM stressed request for SNF choices for plan B. Pt's spouse to initiate appeal, review SNF and follow-up with CM.  aware.      Chago Snowden MSW  Care Management

## 2023-11-17 LAB
ANION GAP SERPL CALC-SCNC: 5 MMOL/L (ref 5–15)
BASOPHILS # BLD: 0 K/UL (ref 0–0.1)
BASOPHILS NFR BLD: 1 % (ref 0–1)
BUN SERPL-MCNC: 29 MG/DL (ref 6–20)
BUN/CREAT SERPL: 24 (ref 12–20)
CALCIUM SERPL-MCNC: 8.2 MG/DL (ref 8.5–10.1)
CHLORIDE SERPL-SCNC: 108 MMOL/L (ref 97–108)
CO2 SERPL-SCNC: 25 MMOL/L (ref 21–32)
CREAT SERPL-MCNC: 1.2 MG/DL (ref 0.7–1.3)
DIFFERENTIAL METHOD BLD: ABNORMAL
EOSINOPHIL # BLD: 0.1 K/UL (ref 0–0.4)
EOSINOPHIL NFR BLD: 2 % (ref 0–7)
ERYTHROCYTE [DISTWIDTH] IN BLOOD BY AUTOMATED COUNT: 13.3 % (ref 11.5–14.5)
GLUCOSE SERPL-MCNC: 100 MG/DL (ref 65–100)
HCT VFR BLD AUTO: 42.4 % (ref 36.6–50.3)
HGB BLD-MCNC: 14 G/DL (ref 12.1–17)
IMM GRANULOCYTES # BLD AUTO: 0 K/UL (ref 0–0.04)
IMM GRANULOCYTES NFR BLD AUTO: 0 % (ref 0–0.5)
LYMPHOCYTES # BLD: 2.3 K/UL (ref 0.8–3.5)
LYMPHOCYTES NFR BLD: 41 % (ref 12–49)
MCH RBC QN AUTO: 31.7 PG (ref 26–34)
MCHC RBC AUTO-ENTMCNC: 33 G/DL (ref 30–36.5)
MCV RBC AUTO: 96.1 FL (ref 80–99)
MONOCYTES # BLD: 0.9 K/UL (ref 0–1)
MONOCYTES NFR BLD: 17 % (ref 5–13)
NEUTS SEG # BLD: 2.1 K/UL (ref 1.8–8)
NEUTS SEG NFR BLD: 39 % (ref 32–75)
NRBC # BLD: 0 K/UL (ref 0–0.01)
NRBC BLD-RTO: 0 PER 100 WBC
PLATELET # BLD AUTO: 213 K/UL (ref 150–400)
PMV BLD AUTO: 11.7 FL (ref 8.9–12.9)
POTASSIUM SERPL-SCNC: 4.3 MMOL/L (ref 3.5–5.1)
RBC # BLD AUTO: 4.41 M/UL (ref 4.1–5.7)
SODIUM SERPL-SCNC: 138 MMOL/L (ref 136–145)
WBC # BLD AUTO: 5.5 K/UL (ref 4.1–11.1)

## 2023-11-17 PROCEDURE — 1100000003 HC PRIVATE W/ TELEMETRY

## 2023-11-17 PROCEDURE — 85025 COMPLETE CBC W/AUTO DIFF WBC: CPT

## 2023-11-17 PROCEDURE — 6370000000 HC RX 637 (ALT 250 FOR IP): Performed by: NURSE PRACTITIONER

## 2023-11-17 PROCEDURE — 94760 N-INVAS EAR/PLS OXIMETRY 1: CPT

## 2023-11-17 PROCEDURE — 2580000003 HC RX 258: Performed by: NURSE PRACTITIONER

## 2023-11-17 PROCEDURE — 97116 GAIT TRAINING THERAPY: CPT

## 2023-11-17 PROCEDURE — 6360000002 HC RX W HCPCS: Performed by: INTERNAL MEDICINE

## 2023-11-17 PROCEDURE — 6370000000 HC RX 637 (ALT 250 FOR IP): Performed by: INTERNAL MEDICINE

## 2023-11-17 PROCEDURE — 6370000000 HC RX 637 (ALT 250 FOR IP): Performed by: PSYCHIATRY & NEUROLOGY

## 2023-11-17 PROCEDURE — 97535 SELF CARE MNGMENT TRAINING: CPT

## 2023-11-17 PROCEDURE — 36415 COLL VENOUS BLD VENIPUNCTURE: CPT

## 2023-11-17 PROCEDURE — 80048 BASIC METABOLIC PNL TOTAL CA: CPT

## 2023-11-17 RX ADMIN — SERTRALINE HYDROCHLORIDE 50 MG: 50 TABLET ORAL at 09:44

## 2023-11-17 RX ADMIN — SODIUM CHLORIDE, PRESERVATIVE FREE 10 ML: 5 INJECTION INTRAVENOUS at 21:08

## 2023-11-17 RX ADMIN — ATORVASTATIN CALCIUM 20 MG: 20 TABLET, FILM COATED ORAL at 21:07

## 2023-11-17 RX ADMIN — HYDROXYCHLOROQUINE SULFATE 200 MG: 200 TABLET ORAL at 21:07

## 2023-11-17 RX ADMIN — ENOXAPARIN SODIUM 30 MG: 100 INJECTION SUBCUTANEOUS at 09:44

## 2023-11-17 RX ADMIN — HYDROXYCHLOROQUINE SULFATE 200 MG: 200 TABLET ORAL at 09:44

## 2023-11-17 RX ADMIN — GABAPENTIN 300 MG: 300 CAPSULE ORAL at 09:44

## 2023-11-17 RX ADMIN — SODIUM CHLORIDE, PRESERVATIVE FREE 10 ML: 5 INJECTION INTRAVENOUS at 09:44

## 2023-11-17 RX ADMIN — ALLOPURINOL 100 MG: 100 TABLET ORAL at 21:07

## 2023-11-17 RX ADMIN — CLOPIDOGREL BISULFATE 75 MG: 75 TABLET ORAL at 09:44

## 2023-11-17 RX ADMIN — ASPIRIN 81 MG: 81 TABLET, CHEWABLE ORAL at 09:44

## 2023-11-17 RX ADMIN — METOPROLOL SUCCINATE 25 MG: 25 TABLET, EXTENDED RELEASE ORAL at 09:44

## 2023-11-17 RX ADMIN — FLUTICASONE PROPIONATE 1 SPRAY: 50 SPRAY, METERED NASAL at 09:45

## 2023-11-17 RX ADMIN — ENOXAPARIN SODIUM 30 MG: 100 INJECTION SUBCUTANEOUS at 21:07

## 2023-11-17 RX ADMIN — ALLOPURINOL 100 MG: 100 TABLET ORAL at 09:44

## 2023-11-17 RX ADMIN — PANTOPRAZOLE SODIUM 40 MG: 40 TABLET, DELAYED RELEASE ORAL at 05:33

## 2023-11-17 RX ADMIN — ESCITALOPRAM OXALATE 10 MG: 10 TABLET ORAL at 09:44

## 2023-11-17 NOTE — CARE COORDINATION
Transition of Care Plan:     RUR: 12%  Prior Level of Functioning: Independent   Disposition: IPR   If SNF or IPR: Date FOC offered: 11/11/23  Date [address] received: 11/11/23  Accepting facility: Pending referrals  Date authorization started with reference number:   Date authorization received and expires: Follow up appointments: Defer to ISNF  DME needed: Defer to SNF  Transportation at discharge: ambulance   IM/IMM Medicare/ letter given: 2nd IMM letter   Is patient a [place] and connected with [place]? No              If yes, was [employer] transfer form completed and VA notified? No  Caregiver Contact: Pt's spouse   Discharge Caregiver contacted prior to discharge? Pt and pt's spouse   Care Conference needed? No  Barriers to discharge: Placement and auth    1237 - CM discussed plan of care with pt's wife. Pt's wife states she has initiated family appeal and will hear response in 72 hours. CM discussed SNF back-up plan. Pt's wife is agreeable to referrals for [facility] and [facility] for potential fast track SNF option. Referrals placed. CM discussed [facility], pt's family declined. CM to continue to follow. [address] with Yogesh Do has contacted pt's wife, Vina Lesches to discuss fast track placement. [facility] does not have a COVID bed available at this time, but may after the weekend. Liaison and family requesting additional COVID test to verify pt's infection status. Family plans to visit tomorrow. CM has asked MARILYN Celaya to reach out to family.      JACKIE Walter  Care Management

## 2023-11-18 LAB
ANION GAP SERPL CALC-SCNC: 5 MMOL/L (ref 5–15)
BASOPHILS # BLD: 0.1 K/UL (ref 0–0.1)
BASOPHILS NFR BLD: 1 % (ref 0–1)
BUN SERPL-MCNC: 26 MG/DL (ref 6–20)
BUN/CREAT SERPL: 24 (ref 12–20)
CALCIUM SERPL-MCNC: 8.4 MG/DL (ref 8.5–10.1)
CHLORIDE SERPL-SCNC: 109 MMOL/L (ref 97–108)
CO2 SERPL-SCNC: 25 MMOL/L (ref 21–32)
CREAT SERPL-MCNC: 1.08 MG/DL (ref 0.7–1.3)
DIFFERENTIAL METHOD BLD: ABNORMAL
EOSINOPHIL # BLD: 0.1 K/UL (ref 0–0.4)
EOSINOPHIL NFR BLD: 2 % (ref 0–7)
ERYTHROCYTE [DISTWIDTH] IN BLOOD BY AUTOMATED COUNT: 13.2 % (ref 11.5–14.5)
GLUCOSE SERPL-MCNC: 95 MG/DL (ref 65–100)
HCT VFR BLD AUTO: 42.3 % (ref 36.6–50.3)
HGB BLD-MCNC: 14 G/DL (ref 12.1–17)
IMM GRANULOCYTES # BLD AUTO: 0 K/UL (ref 0–0.04)
IMM GRANULOCYTES NFR BLD AUTO: 0 % (ref 0–0.5)
LYMPHOCYTES # BLD: 2.5 K/UL (ref 0.8–3.5)
LYMPHOCYTES NFR BLD: 45 % (ref 12–49)
MCH RBC QN AUTO: 31.9 PG (ref 26–34)
MCHC RBC AUTO-ENTMCNC: 33.1 G/DL (ref 30–36.5)
MCV RBC AUTO: 96.4 FL (ref 80–99)
MONOCYTES # BLD: 0.8 K/UL (ref 0–1)
MONOCYTES NFR BLD: 14 % (ref 5–13)
NEUTS SEG # BLD: 2.1 K/UL (ref 1.8–8)
NEUTS SEG NFR BLD: 38 % (ref 32–75)
NRBC # BLD: 0 K/UL (ref 0–0.01)
NRBC BLD-RTO: 0 PER 100 WBC
PLATELET # BLD AUTO: 219 K/UL (ref 150–400)
PMV BLD AUTO: 11.5 FL (ref 8.9–12.9)
POTASSIUM SERPL-SCNC: 4.2 MMOL/L (ref 3.5–5.1)
RBC # BLD AUTO: 4.39 M/UL (ref 4.1–5.7)
SODIUM SERPL-SCNC: 139 MMOL/L (ref 136–145)
WBC # BLD AUTO: 5.5 K/UL (ref 4.1–11.1)

## 2023-11-18 PROCEDURE — 6360000002 HC RX W HCPCS: Performed by: INTERNAL MEDICINE

## 2023-11-18 PROCEDURE — 80048 BASIC METABOLIC PNL TOTAL CA: CPT

## 2023-11-18 PROCEDURE — 6370000000 HC RX 637 (ALT 250 FOR IP): Performed by: INTERNAL MEDICINE

## 2023-11-18 PROCEDURE — 1100000003 HC PRIVATE W/ TELEMETRY

## 2023-11-18 PROCEDURE — 2580000003 HC RX 258: Performed by: NURSE PRACTITIONER

## 2023-11-18 PROCEDURE — 6370000000 HC RX 637 (ALT 250 FOR IP): Performed by: NURSE PRACTITIONER

## 2023-11-18 PROCEDURE — 6370000000 HC RX 637 (ALT 250 FOR IP): Performed by: PSYCHIATRY & NEUROLOGY

## 2023-11-18 PROCEDURE — 85025 COMPLETE CBC W/AUTO DIFF WBC: CPT

## 2023-11-18 PROCEDURE — 36415 COLL VENOUS BLD VENIPUNCTURE: CPT

## 2023-11-18 PROCEDURE — 94760 N-INVAS EAR/PLS OXIMETRY 1: CPT

## 2023-11-18 RX ADMIN — ESCITALOPRAM OXALATE 10 MG: 10 TABLET ORAL at 09:11

## 2023-11-18 RX ADMIN — ENOXAPARIN SODIUM 30 MG: 100 INJECTION SUBCUTANEOUS at 09:11

## 2023-11-18 RX ADMIN — ASPIRIN 81 MG: 81 TABLET, CHEWABLE ORAL at 09:11

## 2023-11-18 RX ADMIN — GABAPENTIN 300 MG: 300 CAPSULE ORAL at 09:11

## 2023-11-18 RX ADMIN — HYDROXYCHLOROQUINE SULFATE 200 MG: 200 TABLET ORAL at 21:33

## 2023-11-18 RX ADMIN — SERTRALINE HYDROCHLORIDE 50 MG: 50 TABLET ORAL at 10:31

## 2023-11-18 RX ADMIN — SODIUM CHLORIDE, PRESERVATIVE FREE 10 ML: 5 INJECTION INTRAVENOUS at 21:34

## 2023-11-18 RX ADMIN — PANTOPRAZOLE SODIUM 40 MG: 40 TABLET, DELAYED RELEASE ORAL at 06:52

## 2023-11-18 RX ADMIN — ALLOPURINOL 100 MG: 100 TABLET ORAL at 09:11

## 2023-11-18 RX ADMIN — METOPROLOL SUCCINATE 25 MG: 25 TABLET, EXTENDED RELEASE ORAL at 10:31

## 2023-11-18 RX ADMIN — ATORVASTATIN CALCIUM 20 MG: 20 TABLET, FILM COATED ORAL at 21:33

## 2023-11-18 RX ADMIN — FLUTICASONE PROPIONATE 1 SPRAY: 50 SPRAY, METERED NASAL at 09:13

## 2023-11-18 RX ADMIN — ENOXAPARIN SODIUM 30 MG: 100 INJECTION SUBCUTANEOUS at 21:33

## 2023-11-18 RX ADMIN — HYDROXYCHLOROQUINE SULFATE 200 MG: 200 TABLET ORAL at 10:31

## 2023-11-18 RX ADMIN — CLOPIDOGREL BISULFATE 75 MG: 75 TABLET ORAL at 09:11

## 2023-11-18 RX ADMIN — ALLOPURINOL 100 MG: 100 TABLET ORAL at 21:33

## 2023-11-18 RX ADMIN — SODIUM CHLORIDE, PRESERVATIVE FREE 10 ML: 5 INJECTION INTRAVENOUS at 09:12

## 2023-11-19 PROCEDURE — 6370000000 HC RX 637 (ALT 250 FOR IP): Performed by: NURSE PRACTITIONER

## 2023-11-19 PROCEDURE — 94760 N-INVAS EAR/PLS OXIMETRY 1: CPT

## 2023-11-19 PROCEDURE — 6370000000 HC RX 637 (ALT 250 FOR IP): Performed by: PSYCHIATRY & NEUROLOGY

## 2023-11-19 PROCEDURE — 6370000000 HC RX 637 (ALT 250 FOR IP): Performed by: INTERNAL MEDICINE

## 2023-11-19 PROCEDURE — 2580000003 HC RX 258: Performed by: NURSE PRACTITIONER

## 2023-11-19 PROCEDURE — 1100000003 HC PRIVATE W/ TELEMETRY

## 2023-11-19 PROCEDURE — 6360000002 HC RX W HCPCS: Performed by: INTERNAL MEDICINE

## 2023-11-19 RX ADMIN — FLUTICASONE PROPIONATE 1 SPRAY: 50 SPRAY, METERED NASAL at 09:02

## 2023-11-19 RX ADMIN — HYDROXYCHLOROQUINE SULFATE 200 MG: 200 TABLET ORAL at 22:00

## 2023-11-19 RX ADMIN — ESCITALOPRAM OXALATE 10 MG: 10 TABLET ORAL at 09:01

## 2023-11-19 RX ADMIN — GABAPENTIN 300 MG: 300 CAPSULE ORAL at 09:01

## 2023-11-19 RX ADMIN — PANTOPRAZOLE SODIUM 40 MG: 40 TABLET, DELAYED RELEASE ORAL at 06:37

## 2023-11-19 RX ADMIN — SERTRALINE HYDROCHLORIDE 50 MG: 50 TABLET ORAL at 09:02

## 2023-11-19 RX ADMIN — METOPROLOL SUCCINATE 25 MG: 25 TABLET, EXTENDED RELEASE ORAL at 09:01

## 2023-11-19 RX ADMIN — ASPIRIN 81 MG: 81 TABLET, CHEWABLE ORAL at 09:01

## 2023-11-19 RX ADMIN — ATORVASTATIN CALCIUM 20 MG: 20 TABLET, FILM COATED ORAL at 22:00

## 2023-11-19 RX ADMIN — ALLOPURINOL 100 MG: 100 TABLET ORAL at 09:01

## 2023-11-19 RX ADMIN — CLOPIDOGREL BISULFATE 75 MG: 75 TABLET ORAL at 09:01

## 2023-11-19 RX ADMIN — ENOXAPARIN SODIUM 30 MG: 100 INJECTION SUBCUTANEOUS at 09:02

## 2023-11-19 RX ADMIN — ALLOPURINOL 100 MG: 100 TABLET ORAL at 22:00

## 2023-11-19 RX ADMIN — HYDROXYCHLOROQUINE SULFATE 200 MG: 200 TABLET ORAL at 09:01

## 2023-11-19 RX ADMIN — SODIUM CHLORIDE, PRESERVATIVE FREE 10 ML: 5 INJECTION INTRAVENOUS at 09:02

## 2023-11-19 RX ADMIN — ENOXAPARIN SODIUM 30 MG: 100 INJECTION SUBCUTANEOUS at 22:00

## 2023-11-20 VITALS
SYSTOLIC BLOOD PRESSURE: 138 MMHG | OXYGEN SATURATION: 96 % | RESPIRATION RATE: 20 BRPM | DIASTOLIC BLOOD PRESSURE: 80 MMHG | WEIGHT: 274.91 LBS | TEMPERATURE: 97.5 F | HEIGHT: 72 IN | HEART RATE: 51 BPM | BODY MASS INDEX: 37.24 KG/M2

## 2023-11-20 LAB
SARS-COV-2 RDRP RESP QL NAA+PROBE: DETECTED
SOURCE: ABNORMAL

## 2023-11-20 PROCEDURE — 97112 NEUROMUSCULAR REEDUCATION: CPT

## 2023-11-20 PROCEDURE — 6370000000 HC RX 637 (ALT 250 FOR IP): Performed by: PSYCHIATRY & NEUROLOGY

## 2023-11-20 PROCEDURE — 97110 THERAPEUTIC EXERCISES: CPT

## 2023-11-20 PROCEDURE — 97116 GAIT TRAINING THERAPY: CPT

## 2023-11-20 PROCEDURE — 92526 ORAL FUNCTION THERAPY: CPT

## 2023-11-20 PROCEDURE — 87635 SARS-COV-2 COVID-19 AMP PRB: CPT

## 2023-11-20 PROCEDURE — 97535 SELF CARE MNGMENT TRAINING: CPT

## 2023-11-20 PROCEDURE — 6370000000 HC RX 637 (ALT 250 FOR IP): Performed by: INTERNAL MEDICINE

## 2023-11-20 PROCEDURE — 6370000000 HC RX 637 (ALT 250 FOR IP): Performed by: NURSE PRACTITIONER

## 2023-11-20 PROCEDURE — 94760 N-INVAS EAR/PLS OXIMETRY 1: CPT

## 2023-11-20 PROCEDURE — 2580000003 HC RX 258: Performed by: NURSE PRACTITIONER

## 2023-11-20 PROCEDURE — 6360000002 HC RX W HCPCS: Performed by: INTERNAL MEDICINE

## 2023-11-20 RX ADMIN — ALLOPURINOL 100 MG: 100 TABLET ORAL at 08:07

## 2023-11-20 RX ADMIN — SODIUM CHLORIDE, PRESERVATIVE FREE 10 ML: 5 INJECTION INTRAVENOUS at 08:08

## 2023-11-20 RX ADMIN — HYDROXYCHLOROQUINE SULFATE 200 MG: 200 TABLET ORAL at 08:07

## 2023-11-20 RX ADMIN — FLUTICASONE PROPIONATE 1 SPRAY: 50 SPRAY, METERED NASAL at 08:10

## 2023-11-20 RX ADMIN — ENOXAPARIN SODIUM 30 MG: 100 INJECTION SUBCUTANEOUS at 08:08

## 2023-11-20 RX ADMIN — ASPIRIN 81 MG: 81 TABLET, CHEWABLE ORAL at 08:07

## 2023-11-20 RX ADMIN — ESCITALOPRAM OXALATE 10 MG: 10 TABLET ORAL at 08:06

## 2023-11-20 RX ADMIN — SERTRALINE HYDROCHLORIDE 50 MG: 50 TABLET ORAL at 08:07

## 2023-11-20 RX ADMIN — GABAPENTIN 300 MG: 300 CAPSULE ORAL at 08:06

## 2023-11-20 RX ADMIN — CLOPIDOGREL BISULFATE 75 MG: 75 TABLET ORAL at 08:06

## 2023-11-20 RX ADMIN — PANTOPRAZOLE SODIUM 40 MG: 40 TABLET, DELAYED RELEASE ORAL at 06:28

## 2023-11-20 NOTE — CARE COORDINATION
Pt is clear from CM standpoint. Report number to [address] is [phone]    Going to room [address] will transport at 3:30 pm.      Transition of Care Plan:     RUR: 10%  Prior Level of Functioning: Independent   Disposition: SNF  If SNF or IPR: Date FOC offered: 11/11/23  Date [address] received: 11/11/23  Accepting facility[facility]  Date authorization started with reference number:   Date authorization received and expires: Follow up appointments: Defer to ISNF  DME needed: Defer to SNF  Transportation at discharge: ambulance   IM/IMM Medicare/ letter given: Given on 11/20/23  Is patient a [place] and connected with VA? No              If yes, was [employer] transfer form completed and VA notified? No  Caregiver Contact: Pt's spouse   Discharge Caregiver contacted prior to discharge? Pt and pt's spouse   Care Conference needed? No  Barriers to discharge: None       11/20/23 [address] [facility] [address] Discharge   [address] Discharge [facility] (SNF)   [address] Provided? No   Mode of Transport at Discharge BLS   Confirm Follow Up Transport Family   Condition of Participation: Discharge Planning   The Plan for Transition of Care is related to the following treatment goals: pt will be d/c to [facility]   The Patient and/or Patient Representative was provided with a Choice of Provider? Patient;Patient Representative   Name of the Patient Representative who was provided with the Choice of Provider and agrees with the Discharge Plan? Pt's spouse Red Gill   The Patient and/Or Patient Representative agree with the Discharge Plan? Yes   Freedom of Choice list was provided with basic dialogue that supports the patient's individualized plan of care/goals, treatment preferences, and shares the quality data associated with the providers?   Yes     Transition of Care Plan to SNF/Rehab    Communication to Patient/Family:  Met with patient

## 2023-11-20 NOTE — DISCHARGE SUMMARY
Report called to Demond LEWIS at [facility].

## 2023-11-20 NOTE — DISCHARGE SUMMARY
Discharge Summary    Name: Pasha Kern  [id number]  YOB: 1950 (Age: 74 y.o.)   Date of Admission: 11/9/2023  Date of Discharge: 11/20/2023  Attending Physician: Tabby Dela Cruz MD    Discharge Diagnosis:   Acute CVA s/p TNK 11/8  COVID-19 infection  CAD  HTN  HLD  Hx of tobacco use  RA  Consultations:  IP CONSULT TO NEUROLOGY  IP CONSULT  [address]      Brief Admission History/Reason for Admission Per Marci Nick MD:     75 y/o PMMx of CAD s/p stent, CVA x 2, HTN, COPD, and anxiety admitted 11/9 from [facility] for acute ischemic stroke s/p TNK. History per patient he was hunting, after climbing out of tree stand had acute onset of R side weakness, unable to ambulate with subsequent ground- level fall (no injuries) CT head no acute findings, Tnk administered, CTA H/N with possible moderate to severe stenosis of the left anterior M2 branch, symptoms initially improved, prior to transfer per report patient had increasing weakness on the right side, CT head performed with no acute change, transferred to [facility] for further management of acute ischemic stroke s/p tenecteplase     ICU evaluation: A/0 x4, right facial asymmetry,RUE 3+/5, + drift, hand droop, RLE, + drift, 4/5. Brief Hospital Course by Main Problems:   Acute CVA s/p TNK 11/8  Transferred out of ICU 11/9  MRI shows late acute versus subacute small infarct in the posterior left frontal lobe  Echo : EF 65-70%,  no shunt mentioned  A1C 5.7 , LDL 44  Cont' ASA and Plavix for 21 days and then aspirin after that  Cont' Lipitor  Neurology cleared the patient for discharge  PT OT recommended placement in a rehabilitation facility  Case management is still working on placement. He is medically ready for discharge. Per case management, insurance Auth is still pending     COVID-19 infection  -Patient reported chest condition today for which COVID was checked and came back positive.   He is currently

## 2024-09-03 NOTE — PROGRESS NOTES
Bedside and Verbal shift change report given to Dina Capellan RN (oncoming nurse) by Karyn Echevarria RN (offgoing nurse). Report included the following information SBAR, Kardex, ED Summary, OR Summary, Procedure Summary, Intake/Output, MAR, Accordion, Recent Results, Med Rec Status and Cardiac Rhythm NSR. hide

## (undated) DEVICE — GUIDEWIRE VASC L260CM 0.035IN J TIP L3MM PTFE FIX COR NAMIC

## (undated) DEVICE — PACK PROCEDURE SURG HRT CATH

## (undated) DEVICE — Device: Brand: ASAHI SION BLUE

## (undated) DEVICE — AIRLIFE™  ADULT CUSHION NASAL CANNULA WITH 7 FOOT (2.1 M) CRUSH-RESISTANT OXYGEN TUBING, AND U/CONNECT-IT ADAPTER: Brand: AIRLIFE™

## (undated) DEVICE — TREK CORONARY DILATATION CATHETER 3.0 MM X 15 MM / RAPID-EXCHANGE: Brand: TREK

## (undated) DEVICE — ANGIOGRAPHY KIT CUST [K0910930B] [MERIT MEDICAL SYSTEMS INC]

## (undated) DEVICE — MINI TREK CORONARY DILATATION CATHETER 2.0 MM X 20 MM / RAPID-EXCHANGE: Brand: MINI TREK

## (undated) DEVICE — Z DUP USE 2517066 CATHETER DIAG 2.7FR L135CM 0.014IN HYDRPHLC OCT IMAG DOC

## (undated) DEVICE — CATH GUID COR JR4.0 6FR 100CM -- LAUNCHER

## (undated) DEVICE — TUBING PRSS MON L6IN PVC M FEM CONN

## (undated) DEVICE — SYR POWER 150ML 8IN FILL TUBE --

## (undated) DEVICE — BLADE ASSEMB CLP HAIR FINE --

## (undated) DEVICE — SYRINGE ANGIO 12ML COR CTRL FIX M LUER CONN RNG GRP SLD RNG

## (undated) DEVICE — Device: Brand: CONFIANZA PRO 12

## (undated) DEVICE — SPLINT WR POS F/ARTERIAL ACC -- BX/10

## (undated) DEVICE — DRAPE PRB US TRNSDCR 6X96IN --

## (undated) DEVICE — CATH GUID COR EB30 6FR 100CM -- LAUNCHER

## (undated) DEVICE — CATHETER ETER ANGIO L110CM OD5FR ID046IN L75CM 038IN 145DEG CARD

## (undated) DEVICE — RADIFOCUS OPTITORQUE ANGIOGRAPHIC CATHETER: Brand: OPTITORQUE

## (undated) DEVICE — GUIDE EXTENSION CATHETER: Brand: GUIDEZILLA™ II

## (undated) DEVICE — SYRINGE ANGIO 10 CC BRL STD PRNT POLYCARB LT BLU MEDALLION

## (undated) DEVICE — Device: Brand: PROWATER

## (undated) DEVICE — CUSTOM KT PTCA INFL DEV K05 00053H

## (undated) DEVICE — DECANTER FLD L85IN IV FLX TBNG CLMP DLP

## (undated) DEVICE — TR BAND RADIAL ARTERY COMPRESSION DEVICE: Brand: TR BAND

## (undated) DEVICE — CATHETER GUID TURNPIKE 135CM DIA2.9X2.9X1.6FR 0.014IN

## (undated) DEVICE — CATH TRAPPER EXCHANGE --

## (undated) DEVICE — GLIDESHEATH SLENDER ACCESS KIT: Brand: GLIDESHEATH SLENDER

## (undated) DEVICE — COPILOT BLEEDBACK CONTROL VALVE: Brand: COPILOT

## (undated) DEVICE — 3M™ TEGADERM™ TRANSPARENT FILM DRESSING FRAME STYLE, 1626W, 4 IN X 4-3/4 IN (10 CM X 12 CM), 50/CT 4CT/CASE: Brand: 3M™ TEGADERM™